# Patient Record
Sex: FEMALE | Race: WHITE | Employment: OTHER | URBAN - METROPOLITAN AREA
[De-identification: names, ages, dates, MRNs, and addresses within clinical notes are randomized per-mention and may not be internally consistent; named-entity substitution may affect disease eponyms.]

---

## 2020-01-06 ENCOUNTER — HOSPITAL ENCOUNTER (OUTPATIENT)
Dept: PREADMISSION TESTING | Age: 65
Discharge: HOME | End: 2020-01-06
Payer: MEDICARE

## 2020-01-06 VITALS
WEIGHT: 237 LBS | TEMPERATURE: 97.9 F | BODY MASS INDEX: 39.49 KG/M2 | SYSTOLIC BLOOD PRESSURE: 135 MMHG | HEART RATE: 84 BPM | DIASTOLIC BLOOD PRESSURE: 80 MMHG | HEIGHT: 65 IN

## 2020-01-06 LAB
ABO + RH BLD: NORMAL
ANION GAP SERPL CALC-SCNC: 6 MMOL/L (ref 5–15)
APPEARANCE UR: CLEAR
ATRIAL RATE: 74 BPM
BACTERIA URNS QL MICRO: NEGATIVE /HPF
BILIRUB UR QL: NEGATIVE
BLOOD GROUP ANTIBODIES SERPL: NORMAL
BUN SERPL-MCNC: 18 MG/DL (ref 6–20)
BUN/CREAT SERPL: 25 (ref 12–20)
CALCIUM SERPL-MCNC: 9.2 MG/DL (ref 8.5–10.1)
CALCULATED P AXIS, ECG09: 51 DEGREES
CALCULATED R AXIS, ECG10: -50 DEGREES
CALCULATED T AXIS, ECG11: 64 DEGREES
CHLORIDE SERPL-SCNC: 101 MMOL/L (ref 97–108)
CO2 SERPL-SCNC: 32 MMOL/L (ref 21–32)
COLOR UR: ABNORMAL
CREAT SERPL-MCNC: 0.73 MG/DL (ref 0.55–1.02)
DIAGNOSIS, 93000: NORMAL
EPITH CASTS URNS QL MICRO: ABNORMAL /LPF
ERYTHROCYTE [DISTWIDTH] IN BLOOD BY AUTOMATED COUNT: 13.6 % (ref 11.5–14.5)
EST. AVERAGE GLUCOSE BLD GHB EST-MCNC: 111 MG/DL
GLUCOSE SERPL-MCNC: 94 MG/DL (ref 65–100)
GLUCOSE UR STRIP.AUTO-MCNC: NEGATIVE MG/DL
HBA1C MFR BLD: 5.5 % (ref 4–5.6)
HCT VFR BLD AUTO: 41.3 % (ref 35–47)
HGB BLD-MCNC: 14 G/DL (ref 11.5–16)
HGB UR QL STRIP: NEGATIVE
HYALINE CASTS URNS QL MICRO: ABNORMAL /LPF (ref 0–5)
INR PPP: 1 (ref 0.9–1.1)
KETONES UR QL STRIP.AUTO: NEGATIVE MG/DL
LEUKOCYTE ESTERASE UR QL STRIP.AUTO: ABNORMAL
MCH RBC QN AUTO: 34.7 PG (ref 26–34)
MCHC RBC AUTO-ENTMCNC: 33.9 G/DL (ref 30–36.5)
MCV RBC AUTO: 102.2 FL (ref 80–99)
NITRITE UR QL STRIP.AUTO: NEGATIVE
NRBC # BLD: 0 K/UL (ref 0–0.01)
NRBC BLD-RTO: 0 PER 100 WBC
P-R INTERVAL, ECG05: 194 MS
PH UR STRIP: 6.5 [PH] (ref 5–8)
PLATELET # BLD AUTO: 159 K/UL (ref 150–400)
PMV BLD AUTO: 11 FL (ref 8.9–12.9)
POTASSIUM SERPL-SCNC: 3.8 MMOL/L (ref 3.5–5.1)
PROT UR STRIP-MCNC: NEGATIVE MG/DL
PROTHROMBIN TIME: 10.1 SEC (ref 9–11.1)
Q-T INTERVAL, ECG07: 456 MS
QRS DURATION, ECG06: 108 MS
QTC CALCULATION (BEZET), ECG08: 506 MS
RBC # BLD AUTO: 4.04 M/UL (ref 3.8–5.2)
RBC #/AREA URNS HPF: ABNORMAL /HPF (ref 0–5)
SODIUM SERPL-SCNC: 139 MMOL/L (ref 136–145)
SP GR UR REFRACTOMETRY: 1.01 (ref 1–1.03)
SPECIMEN EXP DATE BLD: NORMAL
UA: UC IF INDICATED,UAUC: ABNORMAL
UR CULT HOLD, URHOLD: NORMAL
UROBILINOGEN UR QL STRIP.AUTO: 0.2 EU/DL (ref 0.2–1)
VENTRICULAR RATE, ECG03: 74 BPM
WBC # BLD AUTO: 6.3 K/UL (ref 3.6–11)
WBC URNS QL MICRO: ABNORMAL /HPF (ref 0–4)

## 2020-01-06 PROCEDURE — 81001 URINALYSIS AUTO W/SCOPE: CPT

## 2020-01-06 PROCEDURE — 85610 PROTHROMBIN TIME: CPT

## 2020-01-06 PROCEDURE — 80048 BASIC METABOLIC PNL TOTAL CA: CPT

## 2020-01-06 PROCEDURE — 93005 ELECTROCARDIOGRAM TRACING: CPT

## 2020-01-06 PROCEDURE — 85027 COMPLETE CBC AUTOMATED: CPT

## 2020-01-06 PROCEDURE — 36415 COLL VENOUS BLD VENIPUNCTURE: CPT

## 2020-01-06 PROCEDURE — 83036 HEMOGLOBIN GLYCOSYLATED A1C: CPT

## 2020-01-06 PROCEDURE — 86900 BLOOD TYPING SEROLOGIC ABO: CPT

## 2020-01-06 RX ORDER — CHOLECALCIFEROL TAB 125 MCG (5000 UNIT) 125 MCG
1000 TAB ORAL DAILY
COMMUNITY

## 2020-01-06 RX ORDER — LANOLIN ALCOHOL/MO/W.PET/CERES
1000 CREAM (GRAM) TOPICAL DAILY
COMMUNITY

## 2020-01-06 RX ORDER — NABUMETONE 500 MG/1
500 TABLET, FILM COATED ORAL 2 TIMES DAILY
COMMUNITY

## 2020-01-06 RX ORDER — ZINC GLUCONATE 10 MG
250 LOZENGE ORAL DAILY
COMMUNITY

## 2020-01-06 RX ORDER — UREA 10 %
800 LOTION (ML) TOPICAL DAILY
COMMUNITY

## 2020-01-06 RX ORDER — INDAPAMIDE 2.5 MG/1
2.5 TABLET, FILM COATED ORAL DAILY
COMMUNITY

## 2020-01-06 RX ORDER — IBUPROFEN 200 MG
250 CAPSULE ORAL DAILY
COMMUNITY

## 2020-01-06 RX ORDER — METHOTREXATE 2.5 MG/1
2.5 TABLET ORAL
COMMUNITY

## 2020-01-06 RX ORDER — BISMUTH SUBSALICYLATE 262 MG
1 TABLET,CHEWABLE ORAL DAILY
COMMUNITY

## 2020-01-06 NOTE — PERIOP NOTES
PATIENT GIVEN SURGICAL SITE INFECTION FAQ HANDOUT AND HAND WASHING TIP SHEET. PREOP INSTRUCTIONS REVIEWED AND PATIENT VERBALIZES UNDERSTANDING OF INSTRUCTIONS. PATIENT HAS BEEN GIVEN THE OPPORTUNITY TO ASK ADDITIONAL QUESTIONS. PREOPERATIVE INSTRUCTIONS REVIEWED WITH PATIENT. PATIENT GIVEN SIX PACK OF CHG CLEANSER  INSTRUCTIONS (REVIEWED IN CLASS) ON USE OF CHG CLEANSER . PATIENT GIVEN SSI INFECTION FAQ SHEET, AS WELL AS HAND WASHING TIPS SHEETS. MRSA/MSSA TREATMENT INSTRUCTION SHEET GIVEN WITH EXPLANATION TO PATIENT THAT THEY WILL BE NOTIFIED IF TREATMENT INSTRUCTIONS NEED TO BE INITIATED. PATIENT WAS GIVEN THE OPPORTUNITY TO ASK QUESTIONS ON THE INFORMATION PROVIDED. PATIENT ATTENDED PREOP JOINT CLASS TODAY WITH HER DAUGHTER.

## 2020-01-07 LAB
BACTERIA SPEC CULT: NORMAL
BACTERIA SPEC CULT: NORMAL
SERVICE CMNT-IMP: NORMAL

## 2020-01-13 RX ORDER — CEFAZOLIN SODIUM/WATER 2 G/20 ML
2 SYRINGE (ML) INTRAVENOUS ONCE
Status: CANCELLED | OUTPATIENT
Start: 2020-01-15 | End: 2020-01-15

## 2020-01-13 RX ORDER — ACETAMINOPHEN 500 MG
1000 TABLET ORAL ONCE
Status: CANCELLED | OUTPATIENT
Start: 2020-01-15 | End: 2020-01-15

## 2020-01-13 RX ORDER — DEXAMETHASONE SODIUM PHOSPHATE 10 MG/ML
4 INJECTION INTRAMUSCULAR; INTRAVENOUS ONCE
Status: CANCELLED | OUTPATIENT
Start: 2020-01-15 | End: 2020-01-15

## 2020-01-13 RX ORDER — PREGABALIN 75 MG/1
75 CAPSULE ORAL ONCE
Status: CANCELLED | OUTPATIENT
Start: 2020-01-15 | End: 2020-01-15

## 2020-01-13 RX ORDER — CELECOXIB 200 MG/1
200 CAPSULE ORAL ONCE
Status: CANCELLED | OUTPATIENT
Start: 2020-01-15 | End: 2020-01-15

## 2020-01-15 ENCOUNTER — APPOINTMENT (OUTPATIENT)
Dept: GENERAL RADIOLOGY | Age: 65
End: 2020-01-15
Attending: ORTHOPAEDIC SURGERY
Payer: MEDICARE

## 2020-01-15 ENCOUNTER — ANESTHESIA EVENT (OUTPATIENT)
Dept: SURGERY | Age: 65
End: 2020-01-15
Payer: MEDICARE

## 2020-01-15 ENCOUNTER — HOSPITAL ENCOUNTER (OUTPATIENT)
Age: 65
Discharge: HOME | End: 2020-01-16
Attending: ORTHOPAEDIC SURGERY | Admitting: ORTHOPAEDIC SURGERY
Payer: MEDICARE

## 2020-01-15 ENCOUNTER — ANESTHESIA (OUTPATIENT)
Dept: SURGERY | Age: 65
End: 2020-01-15
Payer: MEDICARE

## 2020-01-15 DIAGNOSIS — M17.12 PRIMARY OSTEOARTHRITIS OF LEFT KNEE: Primary | ICD-10-CM

## 2020-01-15 LAB
GLUCOSE BLD STRIP.AUTO-MCNC: 114 MG/DL (ref 65–100)
SERVICE CMNT-IMP: ABNORMAL

## 2020-01-15 PROCEDURE — 77030040922 HC BLNKT HYPOTHRM STRY -A

## 2020-01-15 PROCEDURE — 74011000250 HC RX REV CODE- 250: Performed by: ORTHOPAEDIC SURGERY

## 2020-01-15 PROCEDURE — C1776 JOINT DEVICE (IMPLANTABLE): HCPCS | Performed by: ORTHOPAEDIC SURGERY

## 2020-01-15 PROCEDURE — 77030018846 HC SOL IRR STRL H20 ICUM -A: Performed by: ORTHOPAEDIC SURGERY

## 2020-01-15 PROCEDURE — 77030003601 HC NDL NRV BLK BBMI -A

## 2020-01-15 PROCEDURE — 76210000006 HC OR PH I REC 0.5 TO 1 HR: Performed by: ORTHOPAEDIC SURGERY

## 2020-01-15 PROCEDURE — 74011250636 HC RX REV CODE- 250/636: Performed by: NURSE ANESTHETIST, CERTIFIED REGISTERED

## 2020-01-15 PROCEDURE — C1713 ANCHOR/SCREW BN/BN,TIS/BN: HCPCS | Performed by: ORTHOPAEDIC SURGERY

## 2020-01-15 PROCEDURE — 74011250636 HC RX REV CODE- 250/636: Performed by: ORTHOPAEDIC SURGERY

## 2020-01-15 PROCEDURE — 77030020268 HC MISC GENERAL SUPPLY: Performed by: ORTHOPAEDIC SURGERY

## 2020-01-15 PROCEDURE — 87205 SMEAR GRAM STAIN: CPT

## 2020-01-15 PROCEDURE — 74011250636 HC RX REV CODE- 250/636: Performed by: ANESTHESIOLOGY

## 2020-01-15 PROCEDURE — 77030010783 HC BOWL MX BN CEM J&J -B: Performed by: ORTHOPAEDIC SURGERY

## 2020-01-15 PROCEDURE — 77030012935 HC DRSG AQUACEL BMS -B: Performed by: ORTHOPAEDIC SURGERY

## 2020-01-15 PROCEDURE — 76010000172 HC OR TIME 2.5 TO 3 HR INTENSV-TIER 1: Performed by: ORTHOPAEDIC SURGERY

## 2020-01-15 PROCEDURE — 87075 CULTR BACTERIA EXCEPT BLOOD: CPT

## 2020-01-15 PROCEDURE — 74011000250 HC RX REV CODE- 250: Performed by: ANESTHESIOLOGY

## 2020-01-15 PROCEDURE — 74011000258 HC RX REV CODE- 258: Performed by: ORTHOPAEDIC SURGERY

## 2020-01-15 PROCEDURE — 82962 GLUCOSE BLOOD TEST: CPT

## 2020-01-15 PROCEDURE — 77030006822 HC BLD SAW SAG BRSM -B: Performed by: ORTHOPAEDIC SURGERY

## 2020-01-15 PROCEDURE — 77030018842 HC SOL IRR SOD CL 9% BAXT -A: Performed by: ORTHOPAEDIC SURGERY

## 2020-01-15 PROCEDURE — 77030018673: Performed by: ORTHOPAEDIC SURGERY

## 2020-01-15 PROCEDURE — C9290 INJ, BUPIVACAINE LIPOSOME: HCPCS | Performed by: ORTHOPAEDIC SURGERY

## 2020-01-15 PROCEDURE — 97116 GAIT TRAINING THERAPY: CPT

## 2020-01-15 PROCEDURE — 97161 PT EVAL LOW COMPLEX 20 MIN: CPT

## 2020-01-15 PROCEDURE — 77030018836 HC SOL IRR NACL ICUM -A: Performed by: ORTHOPAEDIC SURGERY

## 2020-01-15 PROCEDURE — 77030031139 HC SUT VCRL2 J&J -A: Performed by: ORTHOPAEDIC SURGERY

## 2020-01-15 PROCEDURE — 76060000036 HC ANESTHESIA 2.5 TO 3 HR: Performed by: ORTHOPAEDIC SURGERY

## 2020-01-15 PROCEDURE — 77030000032 HC CUF TRNQT ZIMM -B: Performed by: ORTHOPAEDIC SURGERY

## 2020-01-15 PROCEDURE — 77030007866 HC KT SPN ANES BBMI -B: Performed by: NURSE ANESTHETIST, CERTIFIED REGISTERED

## 2020-01-15 PROCEDURE — 77030011640 HC PAD GRND REM COVD -A: Performed by: ORTHOPAEDIC SURGERY

## 2020-01-15 PROCEDURE — 74011000250 HC RX REV CODE- 250: Performed by: NURSE ANESTHETIST, CERTIFIED REGISTERED

## 2020-01-15 PROCEDURE — 74011250637 HC RX REV CODE- 250/637: Performed by: ORTHOPAEDIC SURGERY

## 2020-01-15 PROCEDURE — 77030035236 HC SUT PDS STRATFX BARB J&J -B: Performed by: ORTHOPAEDIC SURGERY

## 2020-01-15 PROCEDURE — 73560 X-RAY EXAM OF KNEE 1 OR 2: CPT

## 2020-01-15 DEVICE — ATTUNE PATELLA MEDIALIZED DOME 35MM CEMENTED AOX
Type: IMPLANTABLE DEVICE | Site: KNEE | Status: FUNCTIONAL
Brand: ATTUNE

## 2020-01-15 DEVICE — ATTUNE KNEE SYSTEM FEMORAL CRUCIATE RETAINING SIZE 7 LEFT CEMENTED
Type: IMPLANTABLE DEVICE | Site: KNEE | Status: FUNCTIONAL
Brand: ATTUNE

## 2020-01-15 DEVICE — ATTUNE KNEE SYSTEM REVISION CEMENTED STEM CEMENTED 14X50MM
Type: IMPLANTABLE DEVICE | Site: KNEE | Status: FUNCTIONAL
Brand: ATTUNE

## 2020-01-15 DEVICE — ATTUNE KNEE SYSTEM TIBIAL INSERT FIXED BEARING CRUCIATE RETAINING 7 6MM AOX
Type: IMPLANTABLE DEVICE | Site: KNEE | Status: FUNCTIONAL
Brand: ATTUNE

## 2020-01-15 DEVICE — SMARTSET GHV GENTAMICIN HIGH VISCOSITY BONE CEMENT 40G
Type: IMPLANTABLE DEVICE | Site: KNEE | Status: FUNCTIONAL
Brand: SMARTSET

## 2020-01-15 DEVICE — INSERT TIB RP FEM KNEE CEM: Type: IMPLANTABLE DEVICE | Status: FUNCTIONAL

## 2020-01-15 DEVICE — ATTUNE KNEE SYSTEM REVISION FIXED BEARING TIBIAL BASE CEMENTED SIZE 7
Type: IMPLANTABLE DEVICE | Site: KNEE | Status: FUNCTIONAL
Brand: ATTUNE

## 2020-01-15 RX ORDER — ACETAMINOPHEN 325 MG/1
650 TABLET ORAL EVERY 6 HOURS
Status: DISCONTINUED | OUTPATIENT
Start: 2020-01-15 | End: 2020-01-16 | Stop reason: HOSPADM

## 2020-01-15 RX ORDER — SODIUM CHLORIDE 0.9 % (FLUSH) 0.9 %
5-40 SYRINGE (ML) INJECTION AS NEEDED
Status: DISCONTINUED | OUTPATIENT
Start: 2020-01-15 | End: 2020-01-15 | Stop reason: HOSPADM

## 2020-01-15 RX ORDER — CEFAZOLIN SODIUM/WATER 2 G/20 ML
2 SYRINGE (ML) INTRAVENOUS EVERY 8 HOURS
Status: COMPLETED | OUTPATIENT
Start: 2020-01-15 | End: 2020-01-16

## 2020-01-15 RX ORDER — MIDAZOLAM HYDROCHLORIDE 1 MG/ML
1 INJECTION, SOLUTION INTRAMUSCULAR; INTRAVENOUS AS NEEDED
Status: DISCONTINUED | OUTPATIENT
Start: 2020-01-15 | End: 2020-01-15 | Stop reason: HOSPADM

## 2020-01-15 RX ORDER — NALOXONE HYDROCHLORIDE 0.4 MG/ML
0.4 INJECTION, SOLUTION INTRAMUSCULAR; INTRAVENOUS; SUBCUTANEOUS AS NEEDED
Status: DISCONTINUED | OUTPATIENT
Start: 2020-01-15 | End: 2020-01-16 | Stop reason: HOSPADM

## 2020-01-15 RX ORDER — DEXMEDETOMIDINE HYDROCHLORIDE 100 UG/ML
INJECTION, SOLUTION INTRAVENOUS AS NEEDED
Status: DISCONTINUED | OUTPATIENT
Start: 2020-01-15 | End: 2020-01-15 | Stop reason: HOSPADM

## 2020-01-15 RX ORDER — FACIAL-BODY WIPES
10 EACH TOPICAL DAILY PRN
Status: DISCONTINUED | OUTPATIENT
Start: 2020-01-16 | End: 2020-01-16 | Stop reason: HOSPADM

## 2020-01-15 RX ORDER — LANOLIN ALCOHOL/MO/W.PET/CERES
800 CREAM (GRAM) TOPICAL DAILY
Status: DISCONTINUED | OUTPATIENT
Start: 2020-01-16 | End: 2020-01-16 | Stop reason: HOSPADM

## 2020-01-15 RX ORDER — SODIUM CHLORIDE 0.9 % (FLUSH) 0.9 %
5-40 SYRINGE (ML) INJECTION EVERY 8 HOURS
Status: DISCONTINUED | OUTPATIENT
Start: 2020-01-15 | End: 2020-01-16 | Stop reason: HOSPADM

## 2020-01-15 RX ORDER — DEXAMETHASONE SODIUM PHOSPHATE 10 MG/ML
4 INJECTION INTRAMUSCULAR; INTRAVENOUS ONCE
Status: DISCONTINUED | OUTPATIENT
Start: 2020-01-15 | End: 2020-01-15 | Stop reason: HOSPADM

## 2020-01-15 RX ORDER — HYDROXYZINE HYDROCHLORIDE 10 MG/1
10 TABLET, FILM COATED ORAL
Status: DISCONTINUED | OUTPATIENT
Start: 2020-01-15 | End: 2020-01-16 | Stop reason: HOSPADM

## 2020-01-15 RX ORDER — ONDANSETRON 2 MG/ML
4 INJECTION INTRAMUSCULAR; INTRAVENOUS
Status: DISCONTINUED | OUTPATIENT
Start: 2020-01-15 | End: 2020-01-16 | Stop reason: HOSPADM

## 2020-01-15 RX ORDER — SODIUM CHLORIDE 9 MG/ML
125 INJECTION, SOLUTION INTRAVENOUS CONTINUOUS
Status: DISPENSED | OUTPATIENT
Start: 2020-01-15 | End: 2020-01-16

## 2020-01-15 RX ORDER — PROPOFOL 10 MG/ML
INJECTION, EMULSION INTRAVENOUS AS NEEDED
Status: DISCONTINUED | OUTPATIENT
Start: 2020-01-15 | End: 2020-01-15 | Stop reason: HOSPADM

## 2020-01-15 RX ORDER — FENTANYL CITRATE 50 UG/ML
50 INJECTION, SOLUTION INTRAMUSCULAR; INTRAVENOUS AS NEEDED
Status: DISCONTINUED | OUTPATIENT
Start: 2020-01-15 | End: 2020-01-15 | Stop reason: HOSPADM

## 2020-01-15 RX ORDER — SODIUM CHLORIDE 0.9 % (FLUSH) 0.9 %
5-40 SYRINGE (ML) INJECTION AS NEEDED
Status: DISCONTINUED | OUTPATIENT
Start: 2020-01-15 | End: 2020-01-16 | Stop reason: HOSPADM

## 2020-01-15 RX ORDER — POLYETHYLENE GLYCOL 3350 17 G/17G
17 POWDER, FOR SOLUTION ORAL DAILY
Status: DISCONTINUED | OUTPATIENT
Start: 2020-01-16 | End: 2020-01-16 | Stop reason: HOSPADM

## 2020-01-15 RX ORDER — HYDROMORPHONE HYDROCHLORIDE 1 MG/ML
0.2 INJECTION, SOLUTION INTRAMUSCULAR; INTRAVENOUS; SUBCUTANEOUS
Status: DISCONTINUED | OUTPATIENT
Start: 2020-01-15 | End: 2020-01-15 | Stop reason: HOSPADM

## 2020-01-15 RX ORDER — KETOROLAC TROMETHAMINE 30 MG/ML
15 INJECTION, SOLUTION INTRAMUSCULAR; INTRAVENOUS EVERY 6 HOURS
Status: COMPLETED | OUTPATIENT
Start: 2020-01-15 | End: 2020-01-16

## 2020-01-15 RX ORDER — CEFAZOLIN SODIUM/WATER 2 G/20 ML
2 SYRINGE (ML) INTRAVENOUS ONCE
Status: COMPLETED | OUTPATIENT
Start: 2020-01-15 | End: 2020-01-15

## 2020-01-15 RX ORDER — ONDANSETRON 2 MG/ML
4 INJECTION INTRAMUSCULAR; INTRAVENOUS AS NEEDED
Status: DISCONTINUED | OUTPATIENT
Start: 2020-01-15 | End: 2020-01-15 | Stop reason: HOSPADM

## 2020-01-15 RX ORDER — FENTANYL CITRATE 50 UG/ML
25 INJECTION, SOLUTION INTRAMUSCULAR; INTRAVENOUS
Status: DISCONTINUED | OUTPATIENT
Start: 2020-01-15 | End: 2020-01-15 | Stop reason: HOSPADM

## 2020-01-15 RX ORDER — PHENYLEPHRINE HCL IN 0.9% NACL 0.4MG/10ML
SYRINGE (ML) INTRAVENOUS AS NEEDED
Status: DISCONTINUED | OUTPATIENT
Start: 2020-01-15 | End: 2020-01-15 | Stop reason: HOSPADM

## 2020-01-15 RX ORDER — LIDOCAINE HYDROCHLORIDE 10 MG/ML
0.1 INJECTION, SOLUTION EPIDURAL; INFILTRATION; INTRACAUDAL; PERINEURAL AS NEEDED
Status: DISCONTINUED | OUTPATIENT
Start: 2020-01-15 | End: 2020-01-15 | Stop reason: HOSPADM

## 2020-01-15 RX ORDER — FAMOTIDINE 20 MG/1
20 TABLET, FILM COATED ORAL 2 TIMES DAILY
Status: DISCONTINUED | OUTPATIENT
Start: 2020-01-15 | End: 2020-01-16 | Stop reason: HOSPADM

## 2020-01-15 RX ORDER — ACETAMINOPHEN 500 MG
1000 TABLET ORAL ONCE
Status: COMPLETED | OUTPATIENT
Start: 2020-01-15 | End: 2020-01-15

## 2020-01-15 RX ORDER — SODIUM CHLORIDE, SODIUM LACTATE, POTASSIUM CHLORIDE, CALCIUM CHLORIDE 600; 310; 30; 20 MG/100ML; MG/100ML; MG/100ML; MG/100ML
125 INJECTION, SOLUTION INTRAVENOUS CONTINUOUS
Status: DISCONTINUED | OUTPATIENT
Start: 2020-01-15 | End: 2020-01-15 | Stop reason: HOSPADM

## 2020-01-15 RX ORDER — ONDANSETRON 2 MG/ML
INJECTION INTRAMUSCULAR; INTRAVENOUS AS NEEDED
Status: DISCONTINUED | OUTPATIENT
Start: 2020-01-15 | End: 2020-01-15 | Stop reason: HOSPADM

## 2020-01-15 RX ORDER — SODIUM CHLORIDE 9 MG/ML
50 INJECTION, SOLUTION INTRAVENOUS CONTINUOUS
Status: DISCONTINUED | OUTPATIENT
Start: 2020-01-15 | End: 2020-01-15 | Stop reason: HOSPADM

## 2020-01-15 RX ORDER — INDAPAMIDE 2.5 MG/1
2.5 TABLET, FILM COATED ORAL DAILY
Status: DISCONTINUED | OUTPATIENT
Start: 2020-01-16 | End: 2020-01-16 | Stop reason: HOSPADM

## 2020-01-15 RX ORDER — DIPHENHYDRAMINE HYDROCHLORIDE 50 MG/ML
12.5 INJECTION, SOLUTION INTRAMUSCULAR; INTRAVENOUS AS NEEDED
Status: DISCONTINUED | OUTPATIENT
Start: 2020-01-15 | End: 2020-01-15 | Stop reason: HOSPADM

## 2020-01-15 RX ORDER — ASPIRIN 81 MG/1
81 TABLET ORAL 2 TIMES DAILY
Status: DISCONTINUED | OUTPATIENT
Start: 2020-01-15 | End: 2020-01-16 | Stop reason: HOSPADM

## 2020-01-15 RX ORDER — MELATONIN
1000 DAILY
Status: DISCONTINUED | OUTPATIENT
Start: 2020-01-16 | End: 2020-01-16 | Stop reason: HOSPADM

## 2020-01-15 RX ORDER — CELECOXIB 200 MG/1
200 CAPSULE ORAL ONCE
Status: COMPLETED | OUTPATIENT
Start: 2020-01-15 | End: 2020-01-15

## 2020-01-15 RX ORDER — LIDOCAINE HYDROCHLORIDE 20 MG/ML
INJECTION, SOLUTION EPIDURAL; INFILTRATION; INTRACAUDAL; PERINEURAL AS NEEDED
Status: DISCONTINUED | OUTPATIENT
Start: 2020-01-15 | End: 2020-01-15 | Stop reason: HOSPADM

## 2020-01-15 RX ORDER — SODIUM CHLORIDE 0.9 % (FLUSH) 0.9 %
5-40 SYRINGE (ML) INJECTION EVERY 8 HOURS
Status: DISCONTINUED | OUTPATIENT
Start: 2020-01-15 | End: 2020-01-15 | Stop reason: HOSPADM

## 2020-01-15 RX ORDER — HYDROMORPHONE HYDROCHLORIDE 1 MG/ML
0.5 INJECTION, SOLUTION INTRAMUSCULAR; INTRAVENOUS; SUBCUTANEOUS
Status: DISCONTINUED | OUTPATIENT
Start: 2020-01-15 | End: 2020-01-16 | Stop reason: HOSPADM

## 2020-01-15 RX ORDER — SODIUM CHLORIDE 9 MG/ML
25 INJECTION, SOLUTION INTRAVENOUS CONTINUOUS
Status: DISCONTINUED | OUTPATIENT
Start: 2020-01-15 | End: 2020-01-15 | Stop reason: HOSPADM

## 2020-01-15 RX ORDER — MORPHINE SULFATE 10 MG/ML
2 INJECTION, SOLUTION INTRAMUSCULAR; INTRAVENOUS
Status: DISCONTINUED | OUTPATIENT
Start: 2020-01-15 | End: 2020-01-15 | Stop reason: HOSPADM

## 2020-01-15 RX ORDER — LANOLIN ALCOHOL/MO/W.PET/CERES
400 CREAM (GRAM) TOPICAL DAILY
Status: DISCONTINUED | OUTPATIENT
Start: 2020-01-16 | End: 2020-01-16 | Stop reason: HOSPADM

## 2020-01-15 RX ORDER — MIDAZOLAM HYDROCHLORIDE 1 MG/ML
0.5 INJECTION, SOLUTION INTRAMUSCULAR; INTRAVENOUS
Status: DISCONTINUED | OUTPATIENT
Start: 2020-01-15 | End: 2020-01-15 | Stop reason: HOSPADM

## 2020-01-15 RX ORDER — OXYCODONE HYDROCHLORIDE 5 MG/1
5 TABLET ORAL
Status: DISCONTINUED | OUTPATIENT
Start: 2020-01-15 | End: 2020-01-16 | Stop reason: HOSPADM

## 2020-01-15 RX ORDER — PREGABALIN 75 MG/1
75 CAPSULE ORAL ONCE
Status: COMPLETED | OUTPATIENT
Start: 2020-01-15 | End: 2020-01-15

## 2020-01-15 RX ORDER — HYDROCODONE BITARTRATE AND ACETAMINOPHEN 5; 325 MG/1; MG/1
1 TABLET ORAL AS NEEDED
Status: DISCONTINUED | OUTPATIENT
Start: 2020-01-15 | End: 2020-01-15 | Stop reason: HOSPADM

## 2020-01-15 RX ORDER — GLYCOPYRROLATE 0.2 MG/ML
INJECTION INTRAMUSCULAR; INTRAVENOUS AS NEEDED
Status: DISCONTINUED | OUTPATIENT
Start: 2020-01-15 | End: 2020-01-15 | Stop reason: HOSPADM

## 2020-01-15 RX ORDER — THERA TABS 400 MCG
1 TAB ORAL DAILY
Status: DISCONTINUED | OUTPATIENT
Start: 2020-01-16 | End: 2020-01-16 | Stop reason: HOSPADM

## 2020-01-15 RX ORDER — MIDAZOLAM HYDROCHLORIDE 1 MG/ML
INJECTION, SOLUTION INTRAMUSCULAR; INTRAVENOUS AS NEEDED
Status: DISCONTINUED | OUTPATIENT
Start: 2020-01-15 | End: 2020-01-15 | Stop reason: HOSPADM

## 2020-01-15 RX ORDER — PROPOFOL 10 MG/ML
INJECTION, EMULSION INTRAVENOUS
Status: DISCONTINUED | OUTPATIENT
Start: 2020-01-15 | End: 2020-01-15 | Stop reason: HOSPADM

## 2020-01-15 RX ORDER — AMOXICILLIN 250 MG
1 CAPSULE ORAL 2 TIMES DAILY
Status: DISCONTINUED | OUTPATIENT
Start: 2020-01-15 | End: 2020-01-16 | Stop reason: HOSPADM

## 2020-01-15 RX ORDER — SODIUM CHLORIDE 9 MG/ML
INJECTION, SOLUTION INTRAVENOUS
Status: DISCONTINUED | OUTPATIENT
Start: 2020-01-15 | End: 2020-01-15 | Stop reason: HOSPADM

## 2020-01-15 RX ORDER — DEXAMETHASONE SODIUM PHOSPHATE 4 MG/ML
INJECTION, SOLUTION INTRA-ARTICULAR; INTRALESIONAL; INTRAMUSCULAR; INTRAVENOUS; SOFT TISSUE AS NEEDED
Status: DISCONTINUED | OUTPATIENT
Start: 2020-01-15 | End: 2020-01-15 | Stop reason: HOSPADM

## 2020-01-15 RX ORDER — BUPIVACAINE HYDROCHLORIDE 5 MG/ML
INJECTION, SOLUTION EPIDURAL; INTRACAUDAL
Status: COMPLETED | OUTPATIENT
Start: 2020-01-15 | End: 2020-01-15

## 2020-01-15 RX ORDER — TRAMADOL HYDROCHLORIDE 50 MG/1
50 TABLET ORAL
Status: DISCONTINUED | OUTPATIENT
Start: 2020-01-15 | End: 2020-01-15

## 2020-01-15 RX ORDER — SODIUM CHLORIDE, SODIUM LACTATE, POTASSIUM CHLORIDE, CALCIUM CHLORIDE 600; 310; 30; 20 MG/100ML; MG/100ML; MG/100ML; MG/100ML
75 INJECTION, SOLUTION INTRAVENOUS CONTINUOUS
Status: DISCONTINUED | OUTPATIENT
Start: 2020-01-15 | End: 2020-01-15 | Stop reason: HOSPADM

## 2020-01-15 RX ORDER — CALCIUM CARBONATE 500(1250)
500 TABLET ORAL DAILY
Status: DISCONTINUED | OUTPATIENT
Start: 2020-01-16 | End: 2020-01-16 | Stop reason: HOSPADM

## 2020-01-15 RX ORDER — TRAMADOL HYDROCHLORIDE 50 MG/1
50 TABLET ORAL
Status: DISCONTINUED | OUTPATIENT
Start: 2020-01-15 | End: 2020-01-16 | Stop reason: HOSPADM

## 2020-01-15 RX ADMIN — PREGABALIN 75 MG: 75 CAPSULE ORAL at 08:38

## 2020-01-15 RX ADMIN — ACETAMINOPHEN 1000 MG: 500 TABLET ORAL at 08:38

## 2020-01-15 RX ADMIN — Medication 40 MCG: at 11:02

## 2020-01-15 RX ADMIN — DEXMEDETOMIDINE HYDROCHLORIDE 3 MCG: 100 INJECTION, SOLUTION, CONCENTRATE INTRAVENOUS at 09:48

## 2020-01-15 RX ADMIN — ACETAMINOPHEN 650 MG: 325 TABLET ORAL at 17:14

## 2020-01-15 RX ADMIN — SODIUM CHLORIDE 125 ML/HR: 900 INJECTION, SOLUTION INTRAVENOUS at 23:45

## 2020-01-15 RX ADMIN — SENNOSIDES AND DOCUSATE SODIUM 1 TABLET: 8.6; 5 TABLET ORAL at 17:14

## 2020-01-15 RX ADMIN — DEXMEDETOMIDINE HYDROCHLORIDE 3 MCG: 100 INJECTION, SOLUTION, CONCENTRATE INTRAVENOUS at 09:33

## 2020-01-15 RX ADMIN — Medication 80 MCG: at 11:10

## 2020-01-15 RX ADMIN — SODIUM CHLORIDE: 900 INJECTION, SOLUTION INTRAVENOUS at 11:05

## 2020-01-15 RX ADMIN — TRAMADOL HYDROCHLORIDE 50 MG: 50 TABLET, FILM COATED ORAL at 23:35

## 2020-01-15 RX ADMIN — DEXMEDETOMIDINE HYDROCHLORIDE 3 MCG: 100 INJECTION, SOLUTION, CONCENTRATE INTRAVENOUS at 09:57

## 2020-01-15 RX ADMIN — PROPOFOL 25 MCG/KG/MIN: 10 INJECTION, EMULSION INTRAVENOUS at 09:13

## 2020-01-15 RX ADMIN — LIDOCAINE HYDROCHLORIDE 100 MG: 20 INJECTION, SOLUTION EPIDURAL; INFILTRATION; INTRACAUDAL; PERINEURAL at 09:13

## 2020-01-15 RX ADMIN — Medication 40 MCG: at 10:36

## 2020-01-15 RX ADMIN — MIDAZOLAM HYDROCHLORIDE 2 MG: 1 INJECTION, SOLUTION INTRAMUSCULAR; INTRAVENOUS at 09:11

## 2020-01-15 RX ADMIN — Medication 80 MCG: at 11:06

## 2020-01-15 RX ADMIN — DEXMEDETOMIDINE HYDROCHLORIDE 3 MCG: 100 INJECTION, SOLUTION, CONCENTRATE INTRAVENOUS at 09:53

## 2020-01-15 RX ADMIN — BUPIVACAINE HYDROCHLORIDE 10 MG: 5 INJECTION, SOLUTION EPIDURAL; INTRACAUDAL; PERINEURAL at 09:27

## 2020-01-15 RX ADMIN — PHENYLEPHRINE HYDROCHLORIDE 10 MCG/MIN: 10 INJECTION INTRAVENOUS at 09:28

## 2020-01-15 RX ADMIN — Medication 10 ML: at 17:14

## 2020-01-15 RX ADMIN — MIDAZOLAM 2 MG: 1 INJECTION INTRAMUSCULAR; INTRAVENOUS at 08:56

## 2020-01-15 RX ADMIN — PROPOFOL 50 MG: 10 INJECTION, EMULSION INTRAVENOUS at 10:19

## 2020-01-15 RX ADMIN — ONDANSETRON HYDROCHLORIDE 4 MG: 2 INJECTION, SOLUTION INTRAMUSCULAR; INTRAVENOUS at 09:07

## 2020-01-15 RX ADMIN — TRANEXAMIC ACID 1 G: 100 INJECTION, SOLUTION INTRAVENOUS at 09:30

## 2020-01-15 RX ADMIN — LIDOCAINE HYDROCHLORIDE 0.1 ML: 10 INJECTION, SOLUTION EPIDURAL; INFILTRATION; INTRACAUDAL; PERINEURAL at 08:28

## 2020-01-15 RX ADMIN — GLYCOPYRROLATE 0.2 MG: 0.2 INJECTION, SOLUTION INTRAMUSCULAR; INTRAVENOUS at 09:31

## 2020-01-15 RX ADMIN — DEXMEDETOMIDINE HYDROCHLORIDE 3 MCG: 100 INJECTION, SOLUTION, CONCENTRATE INTRAVENOUS at 09:45

## 2020-01-15 RX ADMIN — DEXAMETHASONE SODIUM PHOSPHATE 4 MG: 4 INJECTION, SOLUTION INTRAMUSCULAR; INTRAVENOUS at 09:32

## 2020-01-15 RX ADMIN — SODIUM CHLORIDE, SODIUM LACTATE, POTASSIUM CHLORIDE, AND CALCIUM CHLORIDE 125 ML/HR: 600; 310; 30; 20 INJECTION, SOLUTION INTRAVENOUS at 08:28

## 2020-01-15 RX ADMIN — DEXMEDETOMIDINE HYDROCHLORIDE 3 MCG: 100 INJECTION, SOLUTION, CONCENTRATE INTRAVENOUS at 10:01

## 2020-01-15 RX ADMIN — FENTANYL CITRATE 50 MCG: 50 INJECTION, SOLUTION INTRAMUSCULAR; INTRAVENOUS at 08:56

## 2020-01-15 RX ADMIN — SODIUM CHLORIDE 125 ML/HR: 900 INJECTION, SOLUTION INTRAVENOUS at 17:17

## 2020-01-15 RX ADMIN — DEXMEDETOMIDINE HYDROCHLORIDE 3 MCG: 100 INJECTION, SOLUTION, CONCENTRATE INTRAVENOUS at 09:36

## 2020-01-15 RX ADMIN — ASPIRIN 81 MG: 81 TABLET, COATED ORAL at 17:14

## 2020-01-15 RX ADMIN — FAMOTIDINE 20 MG: 20 TABLET ORAL at 17:13

## 2020-01-15 RX ADMIN — DEXMEDETOMIDINE HYDROCHLORIDE 3 MCG: 100 INJECTION, SOLUTION, CONCENTRATE INTRAVENOUS at 10:12

## 2020-01-15 RX ADMIN — Medication 2 G: at 18:24

## 2020-01-15 RX ADMIN — PROPOFOL 50 MG: 10 INJECTION, EMULSION INTRAVENOUS at 09:30

## 2020-01-15 RX ADMIN — CELECOXIB 200 MG: 200 CAPSULE ORAL at 08:38

## 2020-01-15 RX ADMIN — Medication 2 G: at 09:34

## 2020-01-15 RX ADMIN — DEXMEDETOMIDINE HYDROCHLORIDE 3 MCG: 100 INJECTION, SOLUTION, CONCENTRATE INTRAVENOUS at 09:42

## 2020-01-15 RX ADMIN — KETOROLAC TROMETHAMINE 15 MG: 30 INJECTION, SOLUTION INTRAMUSCULAR at 18:25

## 2020-01-15 RX ADMIN — DEXMEDETOMIDINE HYDROCHLORIDE 3 MCG: 100 INJECTION, SOLUTION, CONCENTRATE INTRAVENOUS at 09:39

## 2020-01-15 RX ADMIN — MIDAZOLAM HYDROCHLORIDE 3 MG: 1 INJECTION, SOLUTION INTRAMUSCULAR; INTRAVENOUS at 09:13

## 2020-01-15 NOTE — ANESTHESIA PREPROCEDURE EVALUATION
Relevant Problems   No relevant active problems       Anesthetic History   No history of anesthetic complications            Review of Systems / Medical History  Patient summary reviewed, nursing notes reviewed and pertinent labs reviewed    Pulmonary  Within defined limits                 Neuro/Psych   Within defined limits           Cardiovascular  Within defined limits  Hypertension                   GI/Hepatic/Renal  Within defined limits              Endo/Other  Within defined limits      Arthritis     Other Findings              Physical Exam    Airway  Mallampati: II  TM Distance: > 6 cm  Neck ROM: normal range of motion   Mouth opening: Normal     Cardiovascular  Regular rate and rhythm,  S1 and S2 normal,  no murmur, click, rub, or gallop             Dental  No notable dental hx       Pulmonary  Breath sounds clear to auscultation               Abdominal  GI exam deferred       Other Findings            Anesthetic Plan    ASA: 2  Anesthesia type: spinal, MAC and general      Post-op pain plan if not by surgeon: peripheral nerve block single    Induction: Intravenous  Anesthetic plan and risks discussed with: Patient

## 2020-01-15 NOTE — PERIOP NOTES
7074: LT leg adductor canal nerve block done by Dr Cristiano Chappell using 20cc of 0.5% ropivicaine with US guidance, pt monitored, O2 @ 2l/m/nc and IV sedation given. Pt tolerated procedure well. VSS post block: 202/27-30-85, SaO2=94% on 2l/m/nc. See anesthesia note.

## 2020-01-15 NOTE — PROGRESS NOTES
Problem: Mobility Impaired (Adult and Pediatric)  Goal: *Acute Goals and Plan of Care (Insert Text)  Description  FUNCTIONAL STATUS PRIOR TO ADMISSION: Patient was independent and active without use of DME.    HOME SUPPORT PRIOR TO ADMISSION: The patient lived alone with no local support. Physical Therapy Goals  Initiated 1/15/2020    1. Patient will move from supine to sit and sit to supine , scoot up and down and roll side to side in bed with modified independence within 4 days. 2. Patient will perform sit to stand with modified independence within 4 days. 3. Patient will ambulate with modified independence for 300 feet with the least restrictive device within 4 days. 4. Patient will ascend/descend 14 stairs with 1 handrail(s) with supervision/set-up within 4 days. 5. Patient will perform home exercise program per protocol with modified independence within 4 days. 6. Patient will demonstrate AROM 0-90 degrees in operative joint within 4 days. Outcome: Progressing Towards Goal    PHYSICAL THERAPY EVALUATION  Patient: Lauren Vasquez (76 y.o. female)  Date: 1/15/2020  Primary Diagnosis: Osteoarthritis of left knee [M17.12]  Procedure(s) (LRB):  LEFT TOTAL KNEE ARTHROPLASTY (Left) Day of Surgery   Precautions: WBAT         ASSESSMENT  Based on the objective data described below, the patient presents with decreased L LE ROM and strength, impaired balance without UE support, and overall decreased independence with mobility following L TKR POD# 0. Prior to admission, patient was independent with mobiltiy. Patient completed LE exercises per protocol (ankle pumps, quad sets, glut sets x 10). Patient overall min A for bed mobility and Johnny-CGA for transfers to/from standing with RW. Ambulated 50 feet with RW and min A, step to gait pattern. Patient returned to bed at end of session.  Recommend HHPT prior to discharge     Current Level of Function Impacting Discharge (mobility/balance): min A with mobility    Functional Outcome Measure: The patient scored Total Score: 6/28 on the Tinetti outcome measure which is indicative of high fall risk. Patient will benefit from skilled therapy intervention to address the above noted impairments. PLAN :  Recommendations and Planned Interventions: bed mobility training, transfer training, gait training, therapeutic exercises, neuromuscular re-education, patient and family training/education, and therapeutic activities      Frequency/Duration: Patient will be followed by physical therapy:  twice daily to address goals. Recommendation for discharge: (in order for the patient to meet his/her long term goals)  Physical therapy at least 2 days/week in the home     This discharge recommendation:  Has been made in collaboration with the attending provider and/or case management    IF patient discharges home will need the following DME: patient owns DME required for discharge         SUBJECTIVE:   Patient stated im surprised at how well im doing.     OBJECTIVE DATA SUMMARY:   HISTORY:    Past Medical History:   Diagnosis Date    Anxiety     Benign heart murmur     Hypertension     Lymphedema     bilateral LE    RA (rheumatoid arthritis) ([facility] [address]      Past Surgical History:   Procedure Laterality Date    HX COLONOSCOPY      HX TONSILLECTOMY         Personal factors and/or comorbidities impacting plan of care:     Home Situation  Home Environment: Private residence  # Steps to Enter: 3  Rails to Enter: No  One/Two Story Residence: Two story  # of Interior Steps: 6  Interior Rails: Right  Living Alone: No  Support Systems: Family member(s)  Current DME Used/Available at Home: Raised toilet seat  Tub or Shower Type: Tub/Shower combination    EXAMINATION/PRESENTATION/DECISION MAKING:   Critical Behavior:  Neurologic State: Drowsy           Hearing:     Skin:  intact  Edema: post op  Range Of Motion:  AROM: Generally decreased, functional           PROM: Generally decreased, functional           Strength:    Strength: Generally decreased, functional                    Tone & Sensation:   Tone: Normal              Sensation: Intact               Coordination:  Coordination: Generally decreased, functional  Vision:      Functional Mobility:  Bed Mobility:  Rolling: Minimum assistance  Supine to Sit: Minimum assistance  Sit to Supine: Minimum assistance  Scooting: Minimum assistance  Transfers:  Sit to Stand: Minimum assistance  Stand to Sit: Minimum assistance                       Balance:   Sitting: Intact; With support  Standing: With support; Impaired  Standing - Static: Fair;Constant support  Standing - Dynamic : Fair;Constant support  Ambulation/Gait Training:  Distance (ft): 50 Feet (ft)  Assistive Device: Walker, rolling;Gait belt  Ambulation - Level of Assistance: Minimal assistance     Gait Description (WDL): Exceptions to WDL  Gait Abnormalities: Antalgic; Shuffling gait     Left Side Weight Bearing: As tolerated  Base of Support: Widened     Speed/Lashanda: Shuffled; Slow  Step Length: Left shortened;Right shortened     Functional Measure:  Tinetti test:    Sitting Balance: 1  Arises: 0  Attempts to Rise: 0  Immediate Standing Balance: 1  Standing Balance: 1  Nudged: 0  Eyes Closed: 0  Turn 360 Degrees - Continuous/Discontinuous: 0  Turn 360 Degrees - Steady/Unsteady: 0  Sitting Down: 0  Balance Score: 3 Balance total score  Indication of Gait: 0  R Step Length/Height: 0  L Step Length/Height: 1  R Foot Clearance: 1  L Foot Clearance: 1  Step Symmetry: 0  Step Continuity: 0  Path: 0  Trunk: 0  Walking Time: 0  Gait Score: 3 Gait total score  Total Score: 6/28 Overall total score         Tinetti Tool Score Risk of Falls  <19 = High Fall Risk  19-24 = Moderate Fall Risk  25-28 = Low Fall Risk  Tinetti ME. Performance-Oriented Assessment of Mobility Problems in Elderly Patients. [address]; [id number].  (Scoring Description: PT Bulletin Feb. 10, 1993)    Older adults: Nito Anderson et al, 2009; n = 1000 [facility] elderly evaluated with ABC, BALBIR, ADL, and IADL)  · Mean BALBIR score for males aged 69-68 years = 26.21(3.40)  · Mean BALBIR score for females age 69-68 years = 25.16(4.30)  · Mean BALBIR score for males over 80 years = 23.29(6.02)  · Mean BALBIR score for females over 80 years = 17.20(8.32)        Physical Therapy Evaluation Charge Determination   History Examination Presentation Decision-Making   HIGH Complexity :3+ comorbidities / personal factors will impact the outcome/ POC  HIGH Complexity : 4+ Standardized tests and measures addressing body structure, function, activity limitation and / or participation in recreation  LOW Complexity : Stable, uncomplicated  Other outcome measures tinetti  HIGH       Based on the above components, the patient evaluation is determined to be of the following complexity level: LOW     Pain Ratin/10    Activity Tolerance:   Good, Fair, SpO2 stable on RA, and requires rest breaks  Please refer to the flowsheet for vital signs taken during this treatment. After treatment patient left in no apparent distress:   Supine in bed, Call bell within reach, and Caregiver / family present    COMMUNICATION/EDUCATION:   The patients plan of care was discussed with: Registered Nurse and . Fall prevention education was provided and the patient/caregiver indicated understanding. and Patient/family have participated as able in goal setting and plan of care.     Thank you for this referral.  Haider Hewitt, PT   Time Calculation: 29 mins

## 2020-01-15 NOTE — ANESTHESIA POSTPROCEDURE EVALUATION
Procedure(s):  LEFT TOTAL KNEE ARTHROPLASTY. spinal, MAC, general    <BSHSIANPOST>    Vitals Value Taken Time   /54 1/15/2020 12:05 PM   Temp 36.4 °C (97.5 °F) 1/15/2020 12:03 PM   Pulse 70 1/15/2020 12:10 PM   Resp 19 1/15/2020 12:10 PM   SpO2 94 % 1/15/2020 12:10 PM   Vitals shown include unvalidated device data.

## 2020-01-15 NOTE — ANESTHESIA PROCEDURE NOTES
Peripheral Block    Performed by: Mayi Skinner MD  Authorized by: Mayi Skinner MD       Block Type:     Assessment:    Injection Assessment:

## 2020-01-15 NOTE — PERIOP NOTES
Patient: Ajit Yates MRN: [id number]  SSN: [id number]   YOB: 1955  Age: 72 y.o. Sex: female     Patient is status post Procedure(s):  LEFT TOTAL KNEE ARTHROPLASTY. Surgeon(s) and Role:     * Liu Busby MD - Primary    Local/Dose/Irrigation:  70ml Exparel/ 0.50% Marcaine Mix                  Peripheral IV 01/15/20 Left Hand (Active)   Site Assessment Clean, dry, & intact 1/15/2020  8:22 AM   Dressing Status Clean, dry, & intact 1/15/2020  8:22 AM   Dressing Type Transparent 1/15/2020  8:22 AM   Hub Color/Line Status Infusing 1/15/2020  8:22 AM                           Dressing/Packing:  Wound Knee Left-Dressing Type: Aquacel; Cast padding;Elastic bandage;Staples (01/15/20 1129)

## 2020-01-15 NOTE — PERIOP NOTES
TRANSFER - OUT REPORT:    Verbal report given to Mehreen(name) on Sumi Manner  being transferred to 568(unit) for routine post - op       Report consisted of patients Situation, Background, Assessment and   Recommendations(SBAR). Time Pre op antibiotic given:0924  Anesthesia Stop time: 1200  Muir Present on Transfer to floor:no  Order for Muir on Chart:no  Discharge Prescriptions with Chart:no    Information from the following report(s) Procedure Summary and Accordion was reviewed with the receiving nurse. Opportunity for questions and clarification was provided. Is the patient on 02? NO       Is the patient on a monitor? NO    Is the nurse transporting with the patient? NO    Surgical Waiting Area notified of patient's transfer from PACU?  YES      The following personal items collected during your admission accompanied patient upon transfer:   Dental Appliance: Dental Appliances: None  Vision:    Hearing Aid:    Jewelry:    Clothing: Clothing: (clothing bag, glasses to pacu)  Other Valuables:    Valuables sent to safe:      1 bag of clothing and glasses returnedd to pt in pacu

## 2020-01-15 NOTE — H&P
Date of Surgery Update:  Jayesh Frost was seen and examined. History and physical has been reviewed. The patient has been examined. There have been no significant clinical changes since the completion of the originally dated History and Physical.  Patient identified by surgeon; surgical site was confirmed by patient and surgeon.     Signed By: Sivan Rodgers MD     January 15, 2020 8:00 AM

## 2020-01-15 NOTE — PROGRESS NOTES
TRANSFER - IN REPORT:    Verbal report received from Patrick Chacko RN(name) on Silvana Zhu  being received from PACU(unit) for routine post - op      Report consisted of patients Situation, Background, Assessment and   Recommendations(SBAR). Information from the following report(s) SBAR, Kardex, OR Summary, Intake/Output, MAR and Recent Results was reviewed with the receiving nurse. Opportunity for questions and clarification was provided. Assessment completed upon patients arrival to unit and care assumed.

## 2020-01-15 NOTE — PROGRESS NOTES
Occupational Therapy   Orders received, chart review completed. Note patient POD #0 from L TKA. OT will follow up tomorrow for evaluation. Recommend OOB to chair three times a day for meals and self-completion of ADLs as able and medically stable. Thank you.

## 2020-01-15 NOTE — ANESTHESIA PROCEDURE NOTES
Spinal Block    Start time: 1/15/2020 9:20 AM  End time: 1/15/2020 9:28 AM  Performed by: Tru Sandoval CRNA  Authorized by: Tyler Solano MD     Pre-procedure:   Indications: primary anesthetic  Preanesthetic Checklist: patient identified, risks and benefits discussed, anesthesia consent, site marked, patient being monitored and timeout performed    Timeout Time: 09:17          Spinal Block:   Patient Position:  Seated  Prep Region:  Lumbar  Prep: Betadine      Location:  L3-4  Technique:  Single shot        Needle:   Needle Type:  Pencan  Needle Gauge:  24 G  Attempts:  2      Events: CSF confirmed, no blood with aspiration and no paresthesia        Assessment:  Insertion:  Uncomplicated  Patient tolerance:  Patient tolerated the procedure well with no immediate complications

## 2020-01-15 NOTE — OP NOTES
Name: Renae Madrid  MRN:  [id number]  : 1955  Age:  72 y.o. Surgery Date: 1/15/2020      OPERATIVE REPORT - LEFT TOTAL KNEE REPLACEMENT    PREOPERATIVE DIAGNOSIS: Osteoarthritis, left knee. POSTOPERATIVE DIAGNOSIS: Osteoarthritis, left knee. PROCEDURE PERFORMED: Left total knee arthroplasty. SURGEON: Karen Navarro MD    FIRST ASSISTANTJairo Lu    ANESTHESIA: Spinal    PRE-OP ANTIBIOTIC: Ancef 3g    COMPLICATIONS: None. ESTIMATED BLOOD LOSS: 100 mL. SPECIMENS REMOVED: None. COMPONENTS IMPLANTED:   Implant Name Type Inv. Item Serial No.  Lot No. LRB No. Used Action   CEMENT BNE GENTAMC GHV 40GM -- SMARTSET - SN/A  CEMENT BNE GENTAMC GHV 40GM -- SMARTSET N/A [facility] [employer] [id number] Left 2 Implanted   PAT BEBO DOME MEDIAL 35MM -- ATTUNE - SN/A Joint Component PAT BEBO DOME MEDIAL 35MM -- ATTUNE N/A [facility] [employer] [id number] Left 1 Implanted   STEM BEBO [id number] -- ATTUNE - SN/A Joint Component STEM BEBO [id number] -- ATTUNE N/A [facility] [employer] [id number] Left 1 Implanted   FEM CR LT SZ 7 BEBO -- ATTUNE - SN/A Joint Component FEM CR LT SZ 7 BEBO -- ATTUNE N/A [facility] [employer] [id number] Left 1 Implanted   TIB CRS FB BASE SZ 7 BEBO -- ATTUNE - SN/A Joint Component TIB CRS FB BASE SZ 7 BEBO -- ATTUNE N/A [facility] [id number] Left 1 Implanted   INSERT FB SZ 7 6MM -- ATTUNE - SN/A Joint Component INSERT FB SZ 7 6MM -- ATTUNE N/A [facility] [employer] [id number] Left 1 Implanted       INDICATIONS: The patient is an 72 yrs female with progressive debilitating left knee pain due to severe osteoarthritis. Symptoms have progressed despite comprehensive conservative treatment and they presents for left total knee replacement. Risks, benefits, alternatives of the procedure were reviewed in detail and the patient desired to proceed. Risks including bleeding, infection, damage to surrounding structures, blood clots, pulmonary embolism, and death were discussed.  The patient understood understands the increased risk for perioperative medical complications due to his medical comorbidities. DESCRIPTION OF PROCEDURE:DESCRIPTION OF PROCEDURE: Epidural/spinal anesthesia was initiated. Two grams of cefazolin were administered within 30 minutes of incision. The left lower extremity was prepped and draped in the usual sterile fashion. The skin was covered with Ioban occlusive dressing. A tourniquet was only employed for cementation- total time- 16 minutes. A midline skin incision was made with a 10 blade and small flaps were elevated. A medial parapatellar incision was made to the knee. The knee was exposed and the distal femur was cut at 5 degrees distal femoral valgus. The tibia was subluxed and a neutral varus/valgus proximal tibial cut was made matching the patient's slope. The meniscal remnants were removed. The posterior osteophytes were removed from the femur. The extension gap was determined using spacer blocks and appropriate releases were made. Femur was sized per above. An AP cutting block was placed, rotated using a gap balancing techniqe. Anterior, posterior, and chamfer cuts were carried out. The tibial was then sized and correct rotation was identified using the medial 1/3 of the tibial tubercle as a landmark. The tibia was prepared for a stem  using a drill followed by a keel punch. A reciprocating saw was used to begin the cuts for the keel punch to avoid tibial fracture. Trials were placed. The patella was sized using a caliper and an appropriate resection  was performed. Lug holes were drilled and a trial was fitted. The knee tracked well with all trial implants. The trials were then removed. Bony surfaces were drilled, lavaged, and dried. All components were cemented. Excess cement was removed. Polyethylene component was placed. After the cement had fully cured, the knee was ranged and  irrigated copiously with pulsatile lavage.      All surrounding soft tissues were infiltrated with local anesthetic. The arthrotomy  was closed with running quill suture and 0 vicryl suture. Skin and subcutaneous tissues were irrigated and closed in standard fashion with 2-0 vicryl and 3-0 monocryl. An aquacel dressing was placed. The patient underwent straight catheterization at the end of the case. There were no complications. The procedure was a LEFT TOTAL KNEE REPLACEMENT. A Depuy total knee construct was utilized. No specimens were obtained/sent. The patient was transferred to the recovery room in stable condition.       Grey Smith MD

## 2020-01-16 ENCOUNTER — HOME HEALTH ADMISSION (OUTPATIENT)
Dept: HOME HEALTH SERVICES | Facility: HOME HEALTH | Age: 65
End: 2020-01-16
Payer: MEDICARE

## 2020-01-16 VITALS
DIASTOLIC BLOOD PRESSURE: 66 MMHG | HEIGHT: 65 IN | OXYGEN SATURATION: 96 % | RESPIRATION RATE: 16 BRPM | HEART RATE: 90 BPM | SYSTOLIC BLOOD PRESSURE: 112 MMHG | WEIGHT: 236.99 LBS | TEMPERATURE: 98.2 F | BODY MASS INDEX: 39.49 KG/M2

## 2020-01-16 LAB
ANION GAP SERPL CALC-SCNC: 7 MMOL/L (ref 5–15)
BUN SERPL-MCNC: 18 MG/DL (ref 6–20)
BUN/CREAT SERPL: 24 (ref 12–20)
CALCIUM SERPL-MCNC: 8.2 MG/DL (ref 8.5–10.1)
CHLORIDE SERPL-SCNC: 104 MMOL/L (ref 97–108)
CO2 SERPL-SCNC: 26 MMOL/L (ref 21–32)
CREAT SERPL-MCNC: 0.76 MG/DL (ref 0.55–1.02)
GLUCOSE SERPL-MCNC: 118 MG/DL (ref 65–100)
HGB BLD-MCNC: 10.8 G/DL (ref 11.5–16)
POTASSIUM SERPL-SCNC: 3.4 MMOL/L (ref 3.5–5.1)
SODIUM SERPL-SCNC: 137 MMOL/L (ref 136–145)

## 2020-01-16 PROCEDURE — 74011250637 HC RX REV CODE- 250/637: Performed by: ORTHOPAEDIC SURGERY

## 2020-01-16 PROCEDURE — 97116 GAIT TRAINING THERAPY: CPT

## 2020-01-16 PROCEDURE — 97535 SELF CARE MNGMENT TRAINING: CPT

## 2020-01-16 PROCEDURE — 80048 BASIC METABOLIC PNL TOTAL CA: CPT

## 2020-01-16 PROCEDURE — 74011250636 HC RX REV CODE- 250/636: Performed by: ORTHOPAEDIC SURGERY

## 2020-01-16 PROCEDURE — 85018 HEMOGLOBIN: CPT

## 2020-01-16 PROCEDURE — 97165 OT EVAL LOW COMPLEX 30 MIN: CPT

## 2020-01-16 PROCEDURE — 36415 COLL VENOUS BLD VENIPUNCTURE: CPT

## 2020-01-16 RX ORDER — OXYCODONE HYDROCHLORIDE 5 MG/1
5 TABLET ORAL
Qty: 60 TAB | Refills: 0 | Status: SHIPPED | OUTPATIENT
Start: 2020-01-16 | End: 2020-02-15

## 2020-01-16 RX ORDER — MELOXICAM 7.5 MG/1
7.5 TABLET ORAL DAILY
Qty: 60 TAB | Refills: 0 | Status: SHIPPED | OUTPATIENT
Start: 2020-01-16

## 2020-01-16 RX ORDER — TRAMADOL HYDROCHLORIDE 50 MG/1
50 TABLET ORAL
Qty: 60 TAB | Refills: 0 | Status: SHIPPED | OUTPATIENT
Start: 2020-01-16 | End: 2020-02-15

## 2020-01-16 RX ORDER — FAMOTIDINE 20 MG/1
20 TABLET, FILM COATED ORAL 2 TIMES DAILY
Qty: 60 TAB | Refills: 0 | Status: SHIPPED | OUTPATIENT
Start: 2020-01-16

## 2020-01-16 RX ORDER — ASPIRIN 81 MG/1
81 TABLET ORAL 2 TIMES DAILY
Qty: 60 TAB | Refills: 0 | Status: SHIPPED | OUTPATIENT
Start: 2020-01-16

## 2020-01-16 RX ADMIN — FOLIC ACID TAB 400 MCG 0.8 MG: 400 TAB at 08:31

## 2020-01-16 RX ADMIN — Medication 10 ML: at 14:08

## 2020-01-16 RX ADMIN — MELATONIN 1 TABLET: at 08:32

## 2020-01-16 RX ADMIN — FAMOTIDINE 20 MG: 20 TABLET ORAL at 08:32

## 2020-01-16 RX ADMIN — MAGNESIUM OXIDE TAB 400 MG (241.3 MG ELEMENTAL MG) 400 MG: 400 (241.3 MG) TAB at 08:32

## 2020-01-16 RX ADMIN — POLYETHYLENE GLYCOL 3350 17 G: 17 POWDER, FOR SOLUTION ORAL at 08:32

## 2020-01-16 RX ADMIN — Medication 10 ML: at 06:43

## 2020-01-16 RX ADMIN — THERA TABS 1 TABLET: TAB at 08:32

## 2020-01-16 RX ADMIN — SENNOSIDES AND DOCUSATE SODIUM 1 TABLET: 8.6; 5 TABLET ORAL at 08:32

## 2020-01-16 RX ADMIN — Medication 2 G: at 02:39

## 2020-01-16 RX ADMIN — ASPIRIN 81 MG: 81 TABLET, COATED ORAL at 08:31

## 2020-01-16 RX ADMIN — KETOROLAC TROMETHAMINE 15 MG: 30 INJECTION, SOLUTION INTRAMUSCULAR at 06:42

## 2020-01-16 RX ADMIN — TRAMADOL HYDROCHLORIDE 50 MG: 50 TABLET, FILM COATED ORAL at 12:00

## 2020-01-16 RX ADMIN — KETOROLAC TROMETHAMINE 15 MG: 30 INJECTION, SOLUTION INTRAMUSCULAR at 02:39

## 2020-01-16 RX ADMIN — ACETAMINOPHEN 650 MG: 325 TABLET ORAL at 11:01

## 2020-01-16 RX ADMIN — CALCIUM 500 MG: 500 TABLET ORAL at 08:31

## 2020-01-16 RX ADMIN — KETOROLAC TROMETHAMINE 15 MG: 30 INJECTION, SOLUTION INTRAMUSCULAR at 14:06

## 2020-01-16 RX ADMIN — ACETAMINOPHEN 650 MG: 325 TABLET ORAL at 06:42

## 2020-01-16 NOTE — PROGRESS NOTES
I have reviewed discharge instructions with the patient and daughter. The patient and daughter verbalized understanding. All questions answered. Given extra dry dressings, taken down to patient discharge by volunteers.

## 2020-01-16 NOTE — PROGRESS NOTES
Resting comfortably      GEN:  NAD.  AOx3   ABD:  S/NT/ND   LLE:  Dressing C/D/I , 5/5 motor, Calf nttp (Bilat), Sensation rossly intact to light touch throughout, 1+ dp/pt pulses, foot perfused    Patient Vitals for the past 24 hrs:   Temp Pulse Resp BP SpO2   01/16/20 0821 98.2 °F (36.8 °C) 90 16 112/66 96 %   01/16/20 0438 97.7 °F (36.5 °C) 83 16 123/77 94 %   01/15/20 2332 97.7 °F (36.5 °C)  16 118/77 95 %   01/15/20 2030 98 °F (36.7 °C) 87 15 149/83 97 %   01/15/20 1920 97.9 °F (36.6 °C) 91 16 116/73 97 %   01/15/20 1815 98 °F (36.7 °C) 90 16 125/79 96 %   01/15/20 1711 97.8 °F (36.6 °C) 83 16 (!) 149/94 97 %   01/15/20 1639  93  (!) 153/92    01/15/20 1636  88  146/90    01/15/20 1633  77  126/80    01/15/20 1609 98.2 °F (36.8 °C) 77 14 139/76 94 %   01/15/20 1500  68 10 126/68 100 %   01/15/20 1440  69 14  100 %   01/15/20 1435  68 10  100 %   01/15/20 1430  69 17 114/61 100 %   01/15/20 1425  69 18  99 %   01/15/20 1420  67 12  96 %   01/15/20 1415  68 17  94 %   01/15/20 1410  67 20  92 %   01/15/20 1405  68 15  100 %   01/15/20 1400  67 14 123/56 99 %   01/15/20 1355  66 14  100 %   01/15/20 1350  67 11  100 %   01/15/20 1345  63 12  99 %   01/15/20 1340  66 13  100 %   01/15/20 1335  68 14  100 %   01/15/20 1330  69 17  92 %   01/15/20 1325  71 13  95 %   01/15/20 1320  68 15  94 %   01/15/20 1315  69 14  98 %   01/15/20 1310  68 14  98 %   01/15/20 1305  68 14  96 %   01/15/20 1303 97.6 °F (36.4 °C)       01/15/20 1300  67 14  100 %   01/15/20 1255  67 12  97 %   01/15/20 1250  68 16  95 %   01/15/20 1245  67 17 120/61 96 %   01/15/20 1240  67 14  95 %   01/15/20 1235  68 15  95 %   01/15/20 1230  70 14 120/61 98 %   01/15/20 1225  67 15  98 %   01/15/20 1220  68 16  97 %   01/15/20 1215  68 18 106/57 96 %   01/15/20 1210  70 19 106/58 94 %   01/15/20 1205  64 18 118/54 99 %   01/15/20 1203 97.5 °F (36.4 °C) 68 16 97/49 98 % 01/15/20 1200    97/49        Current Facility-Administered Medications   Medication Dose Route Frequency    calcium carbonate (OS-ABDIRASHID) tablet 500 mg [elemental]  500 mg Oral DAILY    cholecalciferol (VITAMIN D3) (1000 Units /25 mcg) tablet 1 Tab  1,000 Units Oral DAILY    folic acid tablet 0.8 mg  800 mcg Oral DAILY    indapamide (LOZOL) tablet 2.5 mg  2.5 mg Oral DAILY    magnesium oxide (MAG-OX) tablet 400 mg  400 mg Oral DAILY    therapeutic multivitamin (THERAGRAN) tablet 1 Tab  1 Tab Oral DAILY    0.9% sodium chloride infusion  125 mL/hr IntraVENous CONTINUOUS    sodium chloride 0.9 % bolus infusion 500 mL  500 mL IntraVENous ONCE PRN    sodium chloride (NS) flush 5-40 mL  5-40 mL IntraVENous Q8H    sodium chloride (NS) flush 5-40 mL  5-40 mL IntraVENous PRN    acetaminophen (TYLENOL) tablet 650 mg  650 mg Oral Q6H    oxyCODONE IR (ROXICODONE) tablet 5 mg  5 mg Oral Q3H PRN    HYDROmorphone (PF) (DILAUDID) injection 0.5 mg  0.5 mg IntraVENous Q4H PRN    naloxone (NARCAN) injection 0.4 mg  0.4 mg IntraVENous PRN    hydroxyzine HCL (ATARAX) tablet 10 mg  10 mg Oral Q8H PRN    famotidine (PEPCID) tablet 20 mg  20 mg Oral BID    senna-docusate (PERICOLACE) 8.6-50 mg per tablet 1 Tab  1 Tab Oral BID    polyethylene glycol (MIRALAX) packet 17 g  17 g Oral DAILY    bisacodyL (DULCOLAX) suppository 10 mg  10 mg Rectal DAILY PRN    aspirin delayed-release tablet 81 mg  81 mg Oral BID    ketorolac (TORADOL) injection 15 mg  15 mg IntraVENous Q6H    ondansetron (ZOFRAN) injection 4 mg  4 mg IntraVENous Q4H PRN    traMADol (ULTRAM) tablet 50 mg  50 mg Oral Q6H PRN       Lab Results   Component Value Date/Time    HGB 10.8 (L) 01/16/2020 02:55 AM    INR 1.0 01/06/2020 12:00 PM       Lab Results   Component Value Date/Time    Sodium 137 01/16/2020 02:55 AM    Potassium 3.4 (L) 01/16/2020 02:55 AM    Chloride 104 01/16/2020 02:55 AM    CO2 26 01/16/2020 02:55 AM    BUN 18 01/16/2020 02:55 AM Creatinine 0.76 01/16/2020 02:55 AM    Calcium 8.2 (L) 01/16/2020 02:55 AM            72 y.o. female s/p left total knee arthroplasty on 1/15/2020  . Doing well.        ABX: Complete 24 hours perioperative abx  PATHWAY: Straight cath per protocol if needed  DVT Prophylaxis: Aspirin 81 mg enteric coated BID  Weight Bearing: WBAT LLE   Pain Control: Toradol, Tylenol, 15 mg meloxicam to begin evening POD 1 if patient still in hospital, tramadol every 6 hours, oxy 5 mg for breakthrough pain  Disposition: Home, [facility]

## 2020-01-16 NOTE — DISCHARGE INSTRUCTIONS
Discharge Instructions Knee Replacement  Dr. Crow Rodriguez    Patient Name: Louisa Kinsey  Date of procedure:1/15/2020                                   Procedure:Procedure(s):  LEFT TOTAL KNEE ARTHROPLASTY  Surgeon:Surgeon(s) and Role:     * Lynette Urbano MD - Primary               PCP: Marcela Iqbal MD  Date of discharge: No discharge date for patient encounter. Follow up appointments   Follow up with Dr. Crow Rodriguez in 4 weeks. Call [phone] extension [phone] Naheed Cotto) to make an appointment.  If home health has been arranged for you the agency will contact you to arrange dates/times for visits. Please call them if you do not hear from them within 24 hours after you are discharged. When to call your Orthopaedic Surgeon: Call [phone]. If you need to reach us after 5pm or on a weekend call [phone] and the on call physician will be contacted.  Signs of infection-if your incision is red; continues to have drainage; drainage has a foul odor or if you have a persistent fever over 101 degrees   Signs of a blood clot in your leg-calf pain, tenderness, redness, swelling of lower leg    When to call your Primary Care Physician:   Concerns about medical conditions such as diabetes, high blood pressure, asthma, congestive heart failure   Call if blood sugars are elevated, persistent headache or dizziness, coughing or congestion, constipation or diarrhea, burning with urination, abnormal heart rate     When to call [phone] and go to the nearest emergency room   Sudden onset of chest pain, shortness of breath, difficulty breathing     Activity   Weight bearing as tolerated with walker or crutches putting as much weight on your leg as you can tolerate.  Refer to your handbook for instructions and pictures   Complete your Home Exercise Program daily as instructed by the physical therapist.  Refer to your handbook for instructions and pictures   Get up every one hour and walk (except at night when sleeping)   Do not drive or operate heavy machinery    Incision Care   The Aquacel (brown, waterproof) surgical dressing is to remain on your knee for 7 days. On the 7th day have someone gently peel the dressing off by carefully lifting the edge and stretching it slightly to break the adhesive seal and leave incision open to air. You may take a shower with the Aquacel dressing in place   Once the Aquacel is removed, you may get your incision wet but do not submerge your incision under water in a bath tub, hot tub or swimming pool for 8 weeks after surgery. Preventing blood clots    Take aspirin 81 mg twice daily as prescribed for one month following surgery        Pain management   Please take scheduled 650 mg tylenol every 6 hours for 6 weeks   Please take scheduled meloxicam 7.5 mg daily for 6 weeks to decrease swelling, pain, and inflammation   Please take tramadol every 6 hours for pain as needed for pain.  You will be given a prescription for 5 mg tablets of oxycodone. If you have severe pain 1 hour after taking tramadol, you may also take 1 tablet of oxycodone.  Oxycodone can cause nausea and constipation- so please use sparingly and you may stop use as soon as your pain in reasonably controlled     While taking aspirin and meloxicam, please take famotidine (PEPCID) 20 mg twice daily as prescribed to prevent stomach ulcers/irritation.  If you have a history of stomach ulcers, do not take meloxicam.      Avoid alcoholic beverages while taking pain medication   Do not take any over-the-counter medication for pain except Tylenol (acetaminophen)   Keep ice wrap in place except when walking.  Change gel packs every 4 hours   Lie down and elevate your leg on pillows for about 30 minutes after walking to decrease swelling and pain       Diet   Resume usual diet; drink plenty of fluids; eat foods high in fiber   Please take a stool softener (such as Senokot-S or Colace) to prevent constipation while you are taking oxycodone. If constipation occurs, take a laxative (such as Dulcolax tablets, Milk of Magnesia, or a suppository).

## 2020-01-16 NOTE — PROGRESS NOTES
Occupational Therapy  Orders received, chart reviewed and patient evaluated by occupational therapy. Pending progression with skilled acute occupational therapy, recommend:  No skilled occupational therapy/ follow up rehabilitation needs identified at this time. Cleared for home with support from family from OT standpoint. Recommend with nursing patient to complete as able in order to maintain strength, endurance and independence: OOB to chair 3x/day with RW and supervision and functional mobility to the bathroom with RW with supervision. Thank you for your assistance. Full evaluation to follow.        Komal Hernandes OT

## 2020-01-16 NOTE — PROGRESS NOTES
Problem: Patient Education: Go to Patient Education Activity  Goal: Patient/Family Education  Outcome: Progressing Towards Goal     Problem: Knee Replacement: Day of Surgery/Unit  Goal: Activity/Safety  Outcome: Progressing Towards Goal  Note:   Initialize mobilization (This RN or PT)  Assist patient with ambulation with walker  Patient to call for assistance  Call bell within reach  Goal: *Initiate mobility  Outcome: Progressing Towards Goal  Note:   This RN or PT will initialize mobilization  Goal: *Optimal pain control at patient's stated goal  Outcome: Progressing Towards Goal  Note:   Assess pain level and characteristics using numeric pain scale  Administer pain medication as ordered  Reassess pain level and characteristics using numeric pain scale  Goal: *Hemodynamically stable  Outcome: Progressing Towards Goal  Note:   Assess vitals every hour for the first four hours post op  Monitor for changes in vitals

## 2020-01-16 NOTE — ROUTINE PROCESS
Bedside shift change report given to Waldemar Archer (oncoming nurse) by Shaista Carvajal (offgoing nurse). Report included the following information SBAR.

## 2020-01-16 NOTE — PROGRESS NOTES
TRANSITION OF CARE  Home with home health: [facility]. Care Management Interventions  PCP Verified by CM: Yes(KENNY Dior MD)  Transition of Care Consult (CM Consult): [address]: Yes  Physical Therapy Consult: Yes  Current Support Network: Relative's Home  Confirm Follow Up Transport: Family  The Plan for Transition of Care is Related to the Following Treatment Goals : home with home health  The Patient and/or Patient Representative was Provided with a Choice of Provider and Agrees with the Discharge Plan?: Yes  Name of the Patient Representative Who was Provided with a Choice of Provider and Agrees with the Discharge Plan: patient  Freedom of Choice List was Provided with Basic Dialogue that Supports the Patient's Individualized Plan of Care/Goals, Treatment Preferences and Shares the Quality Data Associated with the Providers?: Yes  Discharge Location  Discharge Placement: Home with home health    Reason for Admission:   Osteoarthritis of left knee             S/P left knee arthroplasty                   RRAT Score:            5           Plan for utilizing home health: [facility]                     Current Advanced Directive/Advance Care Plan: Not on file                         Transition of Care Plan:                    CM met with patient and her daughter. Patient lives with her daughter, son in law, and granddaughter. Patient reports good family /social support. Patient was ambulatory prior to admission and expects return to independent functioning. Patient confirmed PCP, health insurance, and prescription coverage. Transport at discharge will be provided bydaughter. CM received consult for home health services. CM offered patient choice of providers. Patient selected [facility] and signed Freedom of Choice form which CM placed on chart. CM sent referral via [employer] to [facility], and referral was accepted.  CM updated AVS and informed patient and staff nurse. No other discharge planning needs presented at this time. CM gave patient the Patient Guide to Observation and Outpatient Care which patient signed--original to chart and copy to patient. CM gave patient the Medicare Outpatient Observation Notice which patient signed-- original to chart and copy to patient.

## 2020-01-16 NOTE — PROGRESS NOTES
OCCUPATIONAL THERAPY EVALUATION/DISCHARGE  Patient: Jane Ulrich (41 y.o. female)  Date: 1/16/2020  Primary Diagnosis: Osteoarthritis of left knee [M17.12]  Procedure(s) (LRB):  LEFT TOTAL KNEE ARTHROPLASTY (Left) 1 Day Post-Op   Precautions: Fall       ASSESSMENT  Based on the objective data described below, the patient presents with ability to complete basic ADL tasks with supervision s/p L TKA POD 1. She lives with her dtr and son in law in a tri level and was mod indep with ADL tasks. She reports attending ACAC class prior to surgery with 25# weight loss. Hx of RA and lymphedema in which she has thigh high garments she wears. Provided education on RW use, home safety, knee precautions, and ADL compensatory techniques with good understanding. She ambulated to the bathroom with supervision with RW, LB dressing for pants with supervision, and toileting transfer with supervision. Mild trunk forward flexion with ambulation at RW, she reports this is her baseline. She owns sock aide and reacher. She is cleared from OT standpoint for dc home with support from daughter. Current Level of Function (ADLs/self-care): UB care indep, LB care supervision, toileting supervision    Functional Outcome Measure: The patient scored 85/100 on the Barthel Index outcome measure which is indicative of mild impairments with ADL tasks. Other factors to consider for discharge: Patient highly independent prior to admission despite lymphedema and RA. PLAN :  Recommendation for discharge: (in order for the patient to meet his/her long term goals)  No skilled occupational therapy/ follow up rehabilitation needs identified at this time.     This discharge recommendation:  Has been made in collaboration with the attending provider and/or case management    IF patient discharges home will need the following DME: patient owns DME required for discharge       SUBJECTIVE:   Patient stated I have a sock tool, it looks different.     OBJECTIVE DATA SUMMARY:   HISTORY:   Past Medical History:   Diagnosis Date    Anxiety     Benign heart murmur     Hypertension     Lymphedema     bilateral LE    RA (rheumatoid arthritis) (HCC)      Past Surgical History:   Procedure Laterality Date    HX COLONOSCOPY      HX TONSILLECTOMY         Prior Level of Function/Environment/Context: Home with daughter and son in law. Mod indep with ADL tasks. Expanded or extensive additional review of patient history:     Home Situation  Home Environment: Private residence  # Steps to Enter: 3  Rails to [employer]: No  One/Two Story Residence: Two story  # of Interior Steps: 6  Interior Rails: Right  Living Alone: No  Support Systems: Family member(s), Child(amber)  Patient Expects to be Discharged to[de-identified] Private residence  Current DME Used/Available at Home: Rheta Hark, rolling, Cane, straight, Adaptive dressing aides  Tub or Shower Type: Tub/Shower combination    Hand dominance: Right    EXAMINATION OF PERFORMANCE DEFICITS:  Cognitive/Behavioral Status:  Neurologic State: Alert        Hearing: Auditory  Auditory Impairment: None         Range of Motion:  AROM: Generally decreased, functional  PROM: Generally decreased, functional        Strength:  Strength: Generally decreased, functional     Coordination:  Coordination: Generally decreased, functional  Fine Motor Skills-Upper: Left Intact; Right Intact(hx RA)    Gross Motor Skills-Upper: Left Intact; Right Intact    Tone & Sensation:  Tone: Normal  Sensation: Intact     Balance:  Sitting: Intact  Standing: Intact; With support    Functional Mobility and Transfers for ADLs:  Bed Mobility:  Supine to Sit: Supervision  Sit to Supine: (remained OOB in chair)    Transfers:  Sit to Stand: Supervision  Stand to Sit: Supervision  Bed to Chair: Supervision  Bathroom Mobility: Supervision/set up(RW)  Toilet Transfer : Supervision(RW)    ADL Assessment:  Feeding: Independent  Oral Facial Hygiene/Grooming: Supervision  Bathing: Supervision; Additional time  Upper Body Dressing: Independent  Lower Body Dressing: Supervision  Toileting: Supervision(RW)        ADL Intervention and task modifications:     Grooming  Washing Face: Supervision(standing)  Washing Hands: Supervision    Lower Body Dressing Assistance  Underpants: Supervision  Pants With Elastic Waist: Supervision    Toileting  Bladder Hygiene: Independent  Clothing Management: Supervision    Bathing: Patient instructed and indicated understanding when bathing to not submerge wound in water, stand to shower or sponge bathe, cover wound with plastic and tape to ensure no water reaches bandage/wound without cues. Dressing joint: Patient instructed and demonstrated understanding to don/doff Left LE first/last with Supervision. Patient instructed and demonstrated to don all clothing while sitting prior to standing, doff all clothing to knees while standing, then sit to doff clothing off from knees to feet in order to facilitate fall prevention, pain management, and energy conservation with Supervision. Home safety: Patient instructed and indicated understanding on home modifications and safety (raise height of ADL objects, appropriate height of chair surfaces, recliner safety, change of floor surfaces, clear pathways) to increase independence and fall prevention. Standing: Patient instructed and demonstrated during ADLs to walk up to sink/counter top/surfaces, step into walker to increase safety of joint and fall prevention with Supervision. Patient educated about knee anatomy and educated to avoid rotation of Left LE. Instructed to apply concept to ADLs within the home (no twisting of knee during reaching across body, square off while using objects, slide objects along surfaces).   Patient instructed and indicated understanding to increase amount of time standing, observe standing position during ADLs in order to increase even weight bearing through bilateral LEs in order to increase independence with ADLs. Goal to be reached 30 days post - op, per orthopedic surgeon or per PT. Tub/shower transfer: Patient instructed and indicated understanding regarding when it is safe to begin transfer into tub/shower (complete stairs with PT, advance exercises with PT high enough to clear tub/shower height). Patient instructed to use the same technique as used with stairs when entering and exiting tub/shower (\"up with the non-surgical, down with the surgical leg\"). Functional Measure:  Barthel Index:    Bathin  Bladder: 10  Bowels: 10  Groomin  Dressing: 10  Feeding: 10  Mobility: 10  Stairs: 5  Toilet Use: 10  Transfer (Bed to Chair and Back): 10  Total: 85/100        The Barthel ADL Index: Guidelines  1. The index should be used as a record of what a patient does, not as a record of what a patient could do. 2. The main aim is to establish degree of independence from any help, physical or verbal, however minor and for whatever reason. 3. The need for supervision renders the patient not independent. 4. A patient's performance should be established using the best available evidence. Asking the patient, friends/relatives and nurses are the usual sources, but direct observation and common sense are also important. However direct testing is not needed. 5. Usually the patient's performance over the preceding 24-48 hours is important, but occasionally longer periods will be relevant. 6. Middle categories imply that the patient supplies over 50 per cent of the effort. 7. Use of aids to be independent is allowed. Yasemin Porter., Barthel, D.W. ([id number]). Functional evaluation: the Barthel Index. [address] [facility] (14)2. [facility] dom[facility]ica DIEGO Johnson, Lupe Valentín., Ashwini [place]., Karissa, [address] (). Measuring the change indisability after inpatient rehabilitation; comparison of the responsiveness of the Barthel Index and Functional [place] Measure.  Journal of Neurology, Neurosurgery, and Psychiatry, 664), 146-269. MARLEEN Machado, GISELE Peguero, & Beltran Morales M.A. (2004.) Assessment of post-stroke quality of life in cost-effectiveness studies: The usefulness of the Barthel Index and the EuroQoL-5D. Quality of Life Research, 15, 696-02         Occupational Therapy Evaluation Charge Determination   History Examination Decision-Making   LOW Complexity : Brief history review  LOW Complexity : 1-3 performance deficits relating to physical, cognitive , or psychosocial skils that result in activity limitations and / or participation restrictions  LOW Complexity : No comorbidities that affect functional and no verbal or physical assistance needed to complete eval tasks       Based on the above components, the patient evaluation is determined to be of the following complexity level: LOW   Pain Rating:  Denies pain- reports mild soreness    Activity Tolerance:   Good  Please refer to the flowsheet for vital signs taken during this treatment. After treatment patient left in no apparent distress:    Sitting in chair, Call bell within reach and Caregiver / family present    COMMUNICATION/EDUCATION:   The patients plan of care was discussed with: Physical Therapy Assistant and Registered Nurse.     Thank you for this referral.  Mahesh Lamb, OT  Time Calculation: 26 mins

## 2020-01-16 NOTE — PROGRESS NOTES
Problem: Mobility Impaired (Adult and Pediatric)  Goal: *Acute Goals and Plan of Care (Insert Text)  Description  FUNCTIONAL STATUS PRIOR TO ADMISSION: Patient was independent and active without use of DME.    HOME SUPPORT PRIOR TO ADMISSION: The patient lived alone with no local support. Physical Therapy Goals  Initiated 1/15/2020    1. Patient will move from supine to sit and sit to supine , scoot up and down and roll side to side in bed with modified independence within 4 days. 2. Patient will perform sit to stand with modified independence within 4 days. 3. Patient will ambulate with modified independence for 300 feet with the least restrictive device within 4 days. 4. Patient will ascend/descend 14 stairs with 1 handrail(s) with supervision/set-up within 4 days. 5. Patient will perform home exercise program per protocol with modified independence within 4 days. 6. Patient will demonstrate AROM 0-90 degrees in operative joint within 4 days. Outcome: Progressing Towards Goal  PHYSICAL THERAPY NOTE  Patient: Sandra Lee (89 y.o. female)  Date: 1/16/2020  Primary Diagnosis: Osteoarthritis of left knee [M17.12]  Procedure(s) (LRB):  LEFT TOTAL KNEE ARTHROPLASTY (Left) 1 Day Post-Op   Precautions: Fall, WBAT  Sandra Lee was seen for morning PT session. Pain managed at this time; reports mild aches but other now quinonez has n bomplaints. She is walking approx 250Ft  feet with RW and Superivision . Patient was instructed in stair management and able to negotiate 4 stairs using single rail and SPC In opposite hand for support CG assistance. Reviewed activity recommendations and restrictions with patient. Sandra Lee is clear for discharge home w/ HHPT from a mobility standpoint and RN is aware.       Morning session functional mobility:  Bed Mobility:     Supine to Sit: (received OOB in chair)  Sit to Supine: (remained OOB in chair)     Transfers:  Sit to Stand: Stand-by assistance  Stand to Sit: Stand-by assistance                       Balance:   Sitting: Intact  Standing: Intact; With support  Ambulation/Gait Training:  Distance (ft): 250 Feet (ft)  Assistive Device: Gait belt;Walker, rolling  Ambulation - Level of Assistance: Supervision        Gait Abnormalities: Antalgic;Decreased step clearance  Right Side Weight Bearing: Full  Left Side Weight Bearing: As tolerated  Base of Support: Widened     Speed/Lashanda: Pace decreased (<100 feet/min)  Step Length: Right lengthened                 Stairs:     Stairs - Level of Assistance: Contact guard assistance(x1; SPC in oppposite hand)       Thank you,  Ade DURAND Means,PTA   Time Calculation: 23 mins

## 2020-01-16 NOTE — PROGRESS NOTES
Primary Nurse Chauncey Wallace and Dora, RN performed a dual skin assessment on this patient No impairment noted  Morgan score is 20        2025-Bedside and Verbal shift change report given to Bernice Edouard RN (oncoming nurse) by Melinda Collazo RN (offgoing nurse). Report included the following information SBAR, Kardex, OR Summary, Intake/Output, MAR and Recent Results.

## 2020-01-16 NOTE — PROGRESS NOTES
Problem: Falls - Risk of  Goal: *Absence of Falls  Description  Document Maulik Nelson Fall Risk and appropriate interventions in the flowsheet.   Outcome: Progressing Towards Goal  Note: Fall Risk Interventions:  Mobility Interventions: PT Consult for mobility concerns, Patient to call before getting OOB         Medication Interventions: Patient to call before getting OOB    Elimination Interventions: Call light in reach              Problem: Knee Replacement: Post-Op Day 1  Goal: *Hemodynamically stable  Outcome: Progressing Towards Goal

## 2020-01-17 ENCOUNTER — HOME CARE VISIT (OUTPATIENT)
Dept: SCHEDULING | Facility: HOME HEALTH | Age: 65
End: 2020-01-17
Payer: MEDICARE

## 2020-01-17 PROCEDURE — 3331090002 HH PPS REVENUE DEBIT

## 2020-01-17 PROCEDURE — G0151 HHCP-SERV OF PT,EA 15 MIN: HCPCS

## 2020-01-17 PROCEDURE — 3331090001 HH PPS REVENUE CREDIT

## 2020-01-17 PROCEDURE — 400013 HH SOC

## 2020-01-18 PROCEDURE — 3331090002 HH PPS REVENUE DEBIT

## 2020-01-18 PROCEDURE — 3331090001 HH PPS REVENUE CREDIT

## 2020-01-19 PROCEDURE — 3331090002 HH PPS REVENUE DEBIT

## 2020-01-19 PROCEDURE — 3331090001 HH PPS REVENUE CREDIT

## 2020-01-20 PROCEDURE — 3331090001 HH PPS REVENUE CREDIT

## 2020-01-20 PROCEDURE — 3331090002 HH PPS REVENUE DEBIT

## 2020-01-21 ENCOUNTER — HOME CARE VISIT (OUTPATIENT)
Dept: SCHEDULING | Facility: HOME HEALTH | Age: 65
End: 2020-01-21
Payer: MEDICARE

## 2020-01-21 PROCEDURE — 3331090001 HH PPS REVENUE CREDIT

## 2020-01-21 PROCEDURE — G0151 HHCP-SERV OF PT,EA 15 MIN: HCPCS

## 2020-01-21 PROCEDURE — 3331090002 HH PPS REVENUE DEBIT

## 2020-01-22 ENCOUNTER — HOME CARE VISIT (OUTPATIENT)
Dept: SCHEDULING | Facility: HOME HEALTH | Age: 65
End: 2020-01-22
Payer: MEDICARE

## 2020-01-22 PROCEDURE — G0151 HHCP-SERV OF PT,EA 15 MIN: HCPCS

## 2020-01-22 PROCEDURE — 3331090002 HH PPS REVENUE DEBIT

## 2020-01-22 PROCEDURE — 3331090001 HH PPS REVENUE CREDIT

## 2020-01-23 ENCOUNTER — HOME CARE VISIT (OUTPATIENT)
Dept: SCHEDULING | Facility: HOME HEALTH | Age: 65
End: 2020-01-23
Payer: MEDICARE

## 2020-01-23 VITALS — OXYGEN SATURATION: 96 % | RESPIRATION RATE: 18 BRPM

## 2020-01-23 PROCEDURE — 3331090002 HH PPS REVENUE DEBIT

## 2020-01-23 PROCEDURE — G0151 HHCP-SERV OF PT,EA 15 MIN: HCPCS

## 2020-01-23 PROCEDURE — 3331090001 HH PPS REVENUE CREDIT

## 2020-01-24 PROCEDURE — 3331090001 HH PPS REVENUE CREDIT

## 2020-01-24 PROCEDURE — 3331090002 HH PPS REVENUE DEBIT

## 2020-01-25 PROCEDURE — 3331090001 HH PPS REVENUE CREDIT

## 2020-01-25 PROCEDURE — 3331090002 HH PPS REVENUE DEBIT

## 2020-01-26 PROCEDURE — 3331090002 HH PPS REVENUE DEBIT

## 2020-01-26 PROCEDURE — 3331090001 HH PPS REVENUE CREDIT

## 2020-01-27 PROCEDURE — 3331090002 HH PPS REVENUE DEBIT

## 2020-01-27 PROCEDURE — 3331090001 HH PPS REVENUE CREDIT

## 2020-01-28 ENCOUNTER — HOME CARE VISIT (OUTPATIENT)
Dept: SCHEDULING | Facility: HOME HEALTH | Age: 65
End: 2020-01-28
Payer: MEDICARE

## 2020-01-28 VITALS
TEMPERATURE: 98.2 F | DIASTOLIC BLOOD PRESSURE: 70 MMHG | SYSTOLIC BLOOD PRESSURE: 130 MMHG | RESPIRATION RATE: 16 BRPM | OXYGEN SATURATION: 98 % | HEART RATE: 86 BPM

## 2020-01-28 PROCEDURE — G0151 HHCP-SERV OF PT,EA 15 MIN: HCPCS

## 2020-01-28 PROCEDURE — 3331090001 HH PPS REVENUE CREDIT

## 2020-01-28 PROCEDURE — 3331090002 HH PPS REVENUE DEBIT

## 2020-01-29 LAB
BACTERIA SPEC CULT: NORMAL
BACTERIA SPEC CULT: NORMAL
GRAM STN SPEC: NORMAL
SERVICE CMNT-IMP: NORMAL
SERVICE CMNT-IMP: NORMAL

## 2020-01-29 PROCEDURE — 3331090001 HH PPS REVENUE CREDIT

## 2020-01-29 PROCEDURE — 3331090002 HH PPS REVENUE DEBIT

## 2020-01-30 ENCOUNTER — HOME CARE VISIT (OUTPATIENT)
Dept: SCHEDULING | Facility: HOME HEALTH | Age: 65
End: 2020-01-30
Payer: MEDICARE

## 2020-01-30 VITALS
TEMPERATURE: 97.8 F | HEART RATE: 66 BPM | DIASTOLIC BLOOD PRESSURE: 68 MMHG | RESPIRATION RATE: 16 BRPM | OXYGEN SATURATION: 97 % | SYSTOLIC BLOOD PRESSURE: 134 MMHG

## 2020-01-30 PROCEDURE — G0151 HHCP-SERV OF PT,EA 15 MIN: HCPCS

## 2020-01-30 PROCEDURE — 3331090001 HH PPS REVENUE CREDIT

## 2020-01-30 PROCEDURE — 3331090002 HH PPS REVENUE DEBIT

## 2020-01-31 PROCEDURE — 3331090002 HH PPS REVENUE DEBIT

## 2020-01-31 PROCEDURE — 3331090001 HH PPS REVENUE CREDIT

## 2020-02-01 PROCEDURE — 3331090002 HH PPS REVENUE DEBIT

## 2020-02-01 PROCEDURE — 3331090001 HH PPS REVENUE CREDIT

## 2020-02-02 PROCEDURE — 3331090002 HH PPS REVENUE DEBIT

## 2020-02-02 PROCEDURE — 3331090001 HH PPS REVENUE CREDIT

## 2020-02-03 PROCEDURE — 3331090001 HH PPS REVENUE CREDIT

## 2020-02-03 PROCEDURE — 3331090002 HH PPS REVENUE DEBIT

## 2020-02-04 ENCOUNTER — HOME CARE VISIT (OUTPATIENT)
Dept: SCHEDULING | Facility: HOME HEALTH | Age: 65
End: 2020-02-04
Payer: MEDICARE

## 2020-02-04 VITALS
RESPIRATION RATE: 16 BRPM | HEART RATE: 66 BPM | TEMPERATURE: 98.1 F | SYSTOLIC BLOOD PRESSURE: 132 MMHG | DIASTOLIC BLOOD PRESSURE: 66 MMHG | OXYGEN SATURATION: 97 %

## 2020-02-04 PROCEDURE — 3331090002 HH PPS REVENUE DEBIT

## 2020-02-04 PROCEDURE — 3331090001 HH PPS REVENUE CREDIT

## 2020-02-04 PROCEDURE — G0151 HHCP-SERV OF PT,EA 15 MIN: HCPCS

## 2020-02-05 PROCEDURE — 3331090001 HH PPS REVENUE CREDIT

## 2020-02-05 PROCEDURE — 3331090002 HH PPS REVENUE DEBIT

## 2020-02-06 ENCOUNTER — HOME CARE VISIT (OUTPATIENT)
Dept: SCHEDULING | Facility: HOME HEALTH | Age: 65
End: 2020-02-06
Payer: MEDICARE

## 2020-02-06 PROCEDURE — 3331090001 HH PPS REVENUE CREDIT

## 2020-02-06 PROCEDURE — 3331090002 HH PPS REVENUE DEBIT

## 2020-02-06 PROCEDURE — G0151 HHCP-SERV OF PT,EA 15 MIN: HCPCS

## 2022-03-19 PROBLEM — M17.12 OSTEOARTHRITIS OF LEFT KNEE: Status: ACTIVE | Noted: 2020-01-15

## 2023-11-10 ENCOUNTER — OFFICE VISIT (OUTPATIENT)
Age: 68
End: 2023-11-10
Payer: MEDICARE

## 2023-11-10 VITALS
BODY MASS INDEX: 37.82 KG/M2 | DIASTOLIC BLOOD PRESSURE: 88 MMHG | WEIGHT: 227 LBS | HEIGHT: 65 IN | HEART RATE: 77 BPM | OXYGEN SATURATION: 99 % | SYSTOLIC BLOOD PRESSURE: 150 MMHG

## 2023-11-10 DIAGNOSIS — M17.12 OSTEOARTHRITIS OF LEFT KNEE: ICD-10-CM

## 2023-11-10 DIAGNOSIS — E78.5 DYSLIPIDEMIA: ICD-10-CM

## 2023-11-10 DIAGNOSIS — I10 HYPERTENSION, UNSPECIFIED TYPE: Primary | ICD-10-CM

## 2023-11-10 DIAGNOSIS — E78.00 HYPERCHOLESTEREMIA: Primary | ICD-10-CM

## 2023-11-10 DIAGNOSIS — E66.9 OBESITY (BMI 30-39.9): ICD-10-CM

## 2023-11-10 DIAGNOSIS — I10 HYPERTENSION, UNSPECIFIED TYPE: ICD-10-CM

## 2023-11-10 DIAGNOSIS — M79.10 MUSCLE PAIN: Primary | ICD-10-CM

## 2023-11-10 PROCEDURE — 1090F PRES/ABSN URINE INCON ASSESS: CPT | Performed by: SPECIALIST

## 2023-11-10 PROCEDURE — 1036F TOBACCO NON-USER: CPT | Performed by: SPECIALIST

## 2023-11-10 PROCEDURE — 1123F ACP DISCUSS/DSCN MKR DOCD: CPT | Performed by: SPECIALIST

## 2023-11-10 PROCEDURE — G8417 CALC BMI ABV UP PARAM F/U: HCPCS | Performed by: SPECIALIST

## 2023-11-10 PROCEDURE — 93005 ELECTROCARDIOGRAM TRACING: CPT | Performed by: SPECIALIST

## 2023-11-10 PROCEDURE — 99204 OFFICE O/P NEW MOD 45 MIN: CPT | Performed by: SPECIALIST

## 2023-11-10 PROCEDURE — 93010 ELECTROCARDIOGRAM REPORT: CPT | Performed by: SPECIALIST

## 2023-11-10 PROCEDURE — G8427 DOCREV CUR MEDS BY ELIG CLIN: HCPCS | Performed by: SPECIALIST

## 2023-11-10 PROCEDURE — 3077F SYST BP >= 140 MM HG: CPT | Performed by: SPECIALIST

## 2023-11-10 PROCEDURE — G8400 PT W/DXA NO RESULTS DOC: HCPCS | Performed by: SPECIALIST

## 2023-11-10 PROCEDURE — G8484 FLU IMMUNIZE NO ADMIN: HCPCS | Performed by: SPECIALIST

## 2023-11-10 PROCEDURE — 3017F COLORECTAL CA SCREEN DOC REV: CPT | Performed by: SPECIALIST

## 2023-11-10 PROCEDURE — 3079F DIAST BP 80-89 MM HG: CPT | Performed by: SPECIALIST

## 2023-11-10 RX ORDER — METHOTREXATE 2.5 MG/1
TABLET ORAL WEEKLY
COMMUNITY

## 2023-11-10 RX ORDER — AMLODIPINE BESYLATE 5 MG/1
5 TABLET ORAL DAILY
COMMUNITY

## 2023-11-10 RX ORDER — VITAMIN B COMPLEX
1 CAPSULE ORAL DAILY
COMMUNITY

## 2023-11-10 RX ORDER — ROSUVASTATIN CALCIUM 20 MG/1
20 TABLET, COATED ORAL DAILY
Qty: 90 TABLET | Refills: 3 | Status: SHIPPED | OUTPATIENT
Start: 2023-11-10

## 2023-11-10 RX ORDER — ROSUVASTATIN CALCIUM 20 MG/1
20 TABLET, COATED ORAL DAILY
COMMUNITY
End: 2023-11-10 | Stop reason: SDUPTHER

## 2023-11-10 RX ORDER — MAGNESIUM GLYCINATE 100 MG
CAPSULE ORAL
COMMUNITY

## 2023-11-10 RX ORDER — ASCORBIC ACID 1000 MG
TABLET ORAL
COMMUNITY

## 2023-11-10 RX ORDER — FOLIC ACID 5 MG
CAPSULE ORAL
COMMUNITY

## 2023-11-10 RX ORDER — CHOLECALCIFEROL (VITAMIN D3) 1250 MCG
CAPSULE ORAL
COMMUNITY

## 2023-11-10 NOTE — PATIENT INSTRUCTIONS

## 2023-12-07 DIAGNOSIS — R93.1 ELEVATED CORONARY ARTERY CALCIUM SCORE: ICD-10-CM

## 2023-12-07 DIAGNOSIS — E66.9 OBESITY (BMI 30-39.9): ICD-10-CM

## 2023-12-07 DIAGNOSIS — R06.02 SHORTNESS OF BREATH: Primary | ICD-10-CM

## 2023-12-07 DIAGNOSIS — I10 HYPERTENSION, UNSPECIFIED TYPE: ICD-10-CM

## 2023-12-07 DIAGNOSIS — E78.5 DYSLIPIDEMIA: ICD-10-CM

## 2023-12-19 ENCOUNTER — ANCILLARY PROCEDURE (OUTPATIENT)
Age: 68
End: 2023-12-19
Payer: MEDICARE

## 2023-12-19 VITALS
WEIGHT: 227 LBS | SYSTOLIC BLOOD PRESSURE: 146 MMHG | BODY MASS INDEX: 37.82 KG/M2 | DIASTOLIC BLOOD PRESSURE: 80 MMHG | HEART RATE: 80 BPM | HEIGHT: 65 IN

## 2023-12-19 VITALS — BODY MASS INDEX: 37.82 KG/M2 | HEIGHT: 65 IN | WEIGHT: 227 LBS

## 2023-12-19 DIAGNOSIS — E78.5 DYSLIPIDEMIA: ICD-10-CM

## 2023-12-19 DIAGNOSIS — I10 HYPERTENSION, UNSPECIFIED TYPE: ICD-10-CM

## 2023-12-19 DIAGNOSIS — R06.02 SHORTNESS OF BREATH: ICD-10-CM

## 2023-12-19 DIAGNOSIS — R93.1 ELEVATED CORONARY ARTERY CALCIUM SCORE: ICD-10-CM

## 2023-12-19 DIAGNOSIS — E66.9 OBESITY (BMI 30-39.9): ICD-10-CM

## 2023-12-19 LAB
ECHO AO ASC DIAM: 3.7 CM
ECHO AO ASCENDING AORTA INDEX: 1.77 CM/M2
ECHO AO ROOT DIAM: 3.4 CM
ECHO AO ROOT INDEX: 1.63 CM/M2
ECHO AV MEAN GRADIENT: 26 MMHG
ECHO AV MEAN VELOCITY: 2.4 M/S
ECHO AV PEAK GRADIENT: 47 MMHG
ECHO AV PEAK VELOCITY: 3.4 M/S
ECHO AV VTI: 75.7 CM
ECHO BSA: 2.17 M2
ECHO EST RA PRESSURE: 3 MMHG
ECHO LA VOL A-L A2C: 102 ML (ref 22–52)
ECHO LA VOL A-L A4C: 123 ML (ref 22–52)
ECHO LA VOL MOD A2C: 99 ML (ref 22–52)
ECHO LA VOL MOD A4C: 115 ML (ref 22–52)
ECHO LA VOLUME AREA LENGTH: 114 ML
ECHO LA VOLUME INDEX A-L A2C: 49 ML/M2 (ref 16–34)
ECHO LA VOLUME INDEX A-L A4C: 59 ML/M2 (ref 16–34)
ECHO LA VOLUME INDEX AREA LENGTH: 55 ML/M2 (ref 16–34)
ECHO LA VOLUME INDEX MOD A2C: 47 ML/M2 (ref 16–34)
ECHO LA VOLUME INDEX MOD A4C: 55 ML/M2 (ref 16–34)
ECHO LV IVSD: 1.3 CM (ref 0.6–0.9)
ECHO MV A VELOCITY: 1.5 M/S
ECHO MV AREA PHT: 2.7 CM2
ECHO MV E DECELERATION TIME (DT): 282.7 MS
ECHO MV E VELOCITY: 1.48 M/S
ECHO MV E/A RATIO: 0.99
ECHO MV MAX VELOCITY: 1.7 M/S
ECHO MV MEAN GRADIENT: 7 MMHG
ECHO MV MEAN VELOCITY: 1.3 M/S
ECHO MV PEAK GRADIENT: 11 MMHG
ECHO MV PRESSURE HALF TIME (PHT): 82 MS
ECHO MV VTI: 42.9 CM
ECHO RA AREA 4C: 17.7 CM2
ECHO RA END SYSTOLIC VOLUME APICAL 4 CHAMBER INDEX BSA: 22 ML/M2
ECHO RA VOLUME: 47 ML
ECHO RIGHT VENTRICULAR SYSTOLIC PRESSURE (RVSP): 30 MMHG
ECHO RV INTERNAL DIMENSION: 3.9 CM
ECHO TV REGURGITANT MAX VELOCITY: 2.62 M/S
ECHO TV REGURGITANT PEAK GRADIENT: 27 MMHG

## 2023-12-19 PROCEDURE — 93306 TTE W/DOPPLER COMPLETE: CPT | Performed by: SPECIALIST

## 2023-12-19 PROCEDURE — 78452 HT MUSCLE IMAGE SPECT MULT: CPT | Performed by: STUDENT IN AN ORGANIZED HEALTH CARE EDUCATION/TRAINING PROGRAM

## 2023-12-19 PROCEDURE — A9500 TC99M SESTAMIBI: HCPCS | Performed by: STUDENT IN AN ORGANIZED HEALTH CARE EDUCATION/TRAINING PROGRAM

## 2023-12-19 PROCEDURE — PBSHW PBB SHADOW CHARGE: Performed by: SPECIALIST

## 2023-12-19 RX ORDER — TETRAKIS(2-METHOXYISOBUTYLISOCYANIDE)COPPER(I) TETRAFLUOROBORATE 1 MG/ML
26.1 INJECTION, POWDER, LYOPHILIZED, FOR SOLUTION INTRAVENOUS
Status: COMPLETED | OUTPATIENT
Start: 2023-12-19 | End: 2023-12-19

## 2023-12-19 RX ORDER — REGADENOSON 0.08 MG/ML
0.4 INJECTION, SOLUTION INTRAVENOUS
Status: COMPLETED | OUTPATIENT
Start: 2023-12-19 | End: 2023-12-19

## 2023-12-19 RX ADMIN — TECHNETIUM TC-99M SESTAMIBI 26.1 MILLICURIE: 1 INJECTION INTRAVENOUS at 12:49

## 2023-12-19 RX ADMIN — REGADENOSON 0.4 MG: 0.08 INJECTION, SOLUTION INTRAVENOUS at 12:54

## 2023-12-20 LAB
ECHO BSA: 2.17 M2
NUC STRESS EJECTION FRACTION: 63 %
STRESS BASELINE DIAS BP: 78 MMHG
STRESS BASELINE HR: 81 BPM
STRESS BASELINE ST DEPRESSION: 0 MM
STRESS BASELINE SYS BP: 146 MMHG
STRESS ESTIMATED WORKLOAD: 0 METS
STRESS EXERCISE DUR MIN: 0 MIN
STRESS EXERCISE DUR SEC: 0 SEC
STRESS O2 SAT PEAK: 98 %
STRESS O2 SAT REST: 98 %
STRESS PEAK DIAS BP: 64 MMHG
STRESS PEAK SYS BP: 108 MMHG
STRESS PERCENT HR ACHIEVED: 63 %
STRESS POST PEAK HR: 96 BPM
STRESS RATE PRESSURE PRODUCT: NORMAL BPM*MMHG
STRESS TARGET HR: 152 BPM
TID: 1.06

## 2023-12-20 PROCEDURE — A9500 TC99M SESTAMIBI: HCPCS | Performed by: STUDENT IN AN ORGANIZED HEALTH CARE EDUCATION/TRAINING PROGRAM

## 2023-12-20 PROCEDURE — 93016 CV STRESS TEST SUPVJ ONLY: CPT | Performed by: STUDENT IN AN ORGANIZED HEALTH CARE EDUCATION/TRAINING PROGRAM

## 2023-12-20 PROCEDURE — 93018 CV STRESS TEST I&R ONLY: CPT | Performed by: STUDENT IN AN ORGANIZED HEALTH CARE EDUCATION/TRAINING PROGRAM

## 2023-12-20 PROCEDURE — 78452 HT MUSCLE IMAGE SPECT MULT: CPT | Performed by: STUDENT IN AN ORGANIZED HEALTH CARE EDUCATION/TRAINING PROGRAM

## 2023-12-20 PROCEDURE — PBSHW PBB SHADOW CHARGE: Performed by: STUDENT IN AN ORGANIZED HEALTH CARE EDUCATION/TRAINING PROGRAM

## 2023-12-20 RX ORDER — TETRAKIS(2-METHOXYISOBUTYLISOCYANIDE)COPPER(I) TETRAFLUOROBORATE 1 MG/ML
26 INJECTION, POWDER, LYOPHILIZED, FOR SOLUTION INTRAVENOUS
Status: COMPLETED | OUTPATIENT
Start: 2023-12-20 | End: 2023-12-20

## 2023-12-20 RX ADMIN — TECHNETIUM TC-99M SESTAMIBI 26 MILLICURIE: 1 INJECTION INTRAVENOUS at 08:05

## 2024-01-02 ENCOUNTER — TELEPHONE (OUTPATIENT)
Age: 69
End: 2024-01-02

## 2024-01-02 DIAGNOSIS — R94.39 ABNORMAL STRESS TEST: Primary | ICD-10-CM

## 2024-01-02 PROBLEM — I35.0 AORTIC STENOSIS: Status: ACTIVE | Noted: 2024-01-02

## 2024-01-02 NOTE — TELEPHONE ENCOUNTER
Spoke with Pt of [facility] schd. For 1/18/2024 At 1:00pm arrive at 11:30am Pt aware that they need a  NPO from Midnight the night before. Check in at the second floor Outpt. Reg. Desk. Pt is to have Labs done between 1/2/24-1/11/24.   Medications:  Ok to take   VO by /Dr. Randall/nurse Tiana RAY

## 2024-01-04 DIAGNOSIS — Z01.818 PREOP TESTING: ICD-10-CM

## 2024-01-04 DIAGNOSIS — R06.02 SHORTNESS OF BREATH: ICD-10-CM

## 2024-01-04 DIAGNOSIS — I10 HYPERTENSION, UNSPECIFIED TYPE: ICD-10-CM

## 2024-01-04 DIAGNOSIS — R93.1 ELEVATED CORONARY ARTERY CALCIUM SCORE: ICD-10-CM

## 2024-01-05 LAB
ANION GAP SERPL CALC-SCNC: 9 MMOL/L (ref 5–15)
BUN SERPL-MCNC: 23 MG/DL (ref 6–20)
BUN/CREAT SERPL: 35 (ref 12–20)
CALCIUM SERPL-MCNC: 9 MG/DL (ref 8.5–10.1)
CHLORIDE SERPL-SCNC: 109 MMOL/L (ref 97–108)
CO2 SERPL-SCNC: 26 MMOL/L (ref 21–32)
CREAT SERPL-MCNC: 0.66 MG/DL (ref 0.55–1.02)
ERYTHROCYTE [DISTWIDTH] IN BLOOD BY AUTOMATED COUNT: 12.8 % (ref 11.5–14.5)
GLUCOSE SERPL-MCNC: 93 MG/DL (ref 65–100)
HCT VFR BLD AUTO: 42.9 % (ref 35–47)
HGB BLD-MCNC: 14.5 G/DL (ref 11.5–16)
MCH RBC QN AUTO: 33.9 PG (ref 26–34)
MCHC RBC AUTO-ENTMCNC: 33.8 G/DL (ref 30–36.5)
MCV RBC AUTO: 100.2 FL (ref 80–99)
NRBC # BLD: 0 K/UL (ref 0–0.01)
NRBC BLD-RTO: 0 PER 100 WBC
PLATELET # BLD AUTO: 114 K/UL (ref 150–400)
PMV BLD AUTO: 11.2 FL (ref 8.9–12.9)
POTASSIUM SERPL-SCNC: 4.8 MMOL/L (ref 3.5–5.1)
RBC # BLD AUTO: 4.28 M/UL (ref 3.8–5.2)
SODIUM SERPL-SCNC: 144 MMOL/L (ref 136–145)
WBC # BLD AUTO: 4.2 K/UL (ref 3.6–11)

## 2024-01-11 ENCOUNTER — DIRECT ADMIT ORDERS (OUTPATIENT)
Facility: HOSPITAL | Age: 69
End: 2024-01-11

## 2024-01-11 DIAGNOSIS — I35.0 AORTIC STENOSIS: ICD-10-CM

## 2024-01-11 DIAGNOSIS — R94.39 ABNORMAL STRESS TEST: Primary | ICD-10-CM

## 2024-01-11 RX ORDER — SODIUM CHLORIDE 0.9 % (FLUSH) 0.9 %
5-40 SYRINGE (ML) INJECTION EVERY 12 HOURS SCHEDULED
OUTPATIENT
Start: 2024-01-18

## 2024-01-11 RX ORDER — SODIUM CHLORIDE 9 MG/ML
INJECTION, SOLUTION INTRAVENOUS PRN
OUTPATIENT
Start: 2024-01-18

## 2024-01-11 RX ORDER — SODIUM CHLORIDE 0.9 % (FLUSH) 0.9 %
5-40 SYRINGE (ML) INJECTION PRN
OUTPATIENT
Start: 2024-01-18

## 2024-01-11 RX ORDER — SODIUM CHLORIDE 9 MG/ML
INJECTION, SOLUTION INTRAVENOUS CONTINUOUS
OUTPATIENT
Start: 2024-01-18

## 2024-01-18 ENCOUNTER — HOSPITAL ENCOUNTER (OUTPATIENT)
Facility: HOSPITAL | Age: 69
Setting detail: OUTPATIENT SURGERY
Discharge: HOME | End: 2024-01-18
Attending: INTERNAL MEDICINE | Admitting: INTERNAL MEDICINE
Payer: MEDICARE

## 2024-01-18 VITALS
DIASTOLIC BLOOD PRESSURE: 86 MMHG | OXYGEN SATURATION: 93 % | SYSTOLIC BLOOD PRESSURE: 149 MMHG | RESPIRATION RATE: 12 BRPM | BODY MASS INDEX: 37.84 KG/M2 | HEIGHT: 65 IN | HEART RATE: 89 BPM | TEMPERATURE: 98.7 F | WEIGHT: 227.1 LBS

## 2024-01-18 DIAGNOSIS — R94.39 ABNORMAL STRESS TEST: ICD-10-CM

## 2024-01-18 DIAGNOSIS — I35.0 AORTIC STENOSIS: ICD-10-CM

## 2024-01-18 LAB
BASOPHILS # BLD: 0 K/UL (ref 0–0.1)
BASOPHILS NFR BLD: 1 % (ref 0–1)
DIFFERENTIAL METHOD BLD: ABNORMAL
ECHO BSA: 2.17 M2
EOSINOPHIL # BLD: 0.1 K/UL (ref 0–0.4)
EOSINOPHIL NFR BLD: 1 % (ref 0–7)
ERYTHROCYTE [DISTWIDTH] IN BLOOD BY AUTOMATED COUNT: 12.9 % (ref 11.5–14.5)
HCT VFR BLD AUTO: 41.6 % (ref 35–47)
HGB BLD-MCNC: 14.4 G/DL (ref 11.5–16)
IMM GRANULOCYTES # BLD AUTO: 0 K/UL (ref 0–0.04)
IMM GRANULOCYTES NFR BLD AUTO: 0 % (ref 0–0.5)
LYMPHOCYTES # BLD: 1.4 K/UL (ref 0.8–3.5)
LYMPHOCYTES NFR BLD: 32 % (ref 12–49)
MCH RBC QN AUTO: 33.5 PG (ref 26–34)
MCHC RBC AUTO-ENTMCNC: 34.6 G/DL (ref 30–36.5)
MCV RBC AUTO: 96.7 FL (ref 80–99)
MONOCYTES # BLD: 0.5 K/UL (ref 0–1)
MONOCYTES NFR BLD: 12 % (ref 5–13)
NEUTS SEG # BLD: 2.3 K/UL (ref 1.8–8)
NEUTS SEG NFR BLD: 54 % (ref 32–75)
NRBC # BLD: 0 K/UL (ref 0–0.01)
NRBC BLD-RTO: 0 PER 100 WBC
PLATELET # BLD AUTO: 128 K/UL (ref 150–400)
PMV BLD AUTO: 10 FL (ref 8.9–12.9)
RBC # BLD AUTO: 4.3 M/UL (ref 3.8–5.2)
WBC # BLD AUTO: 4.3 K/UL (ref 3.6–11)

## 2024-01-18 PROCEDURE — 2709999900 HC NON-CHARGEABLE SUPPLY: Performed by: INTERNAL MEDICINE

## 2024-01-18 PROCEDURE — C1894 INTRO/SHEATH, NON-LASER: HCPCS | Performed by: INTERNAL MEDICINE

## 2024-01-18 PROCEDURE — 2500000003 HC RX 250 WO HCPCS: Performed by: INTERNAL MEDICINE

## 2024-01-18 PROCEDURE — 36415 COLL VENOUS BLD VENIPUNCTURE: CPT

## 2024-01-18 PROCEDURE — 93458 L HRT ARTERY/VENTRICLE ANGIO: CPT | Performed by: INTERNAL MEDICINE

## 2024-01-18 PROCEDURE — 6360000004 HC RX CONTRAST MEDICATION: Performed by: INTERNAL MEDICINE

## 2024-01-18 PROCEDURE — 6360000002 HC RX W HCPCS: Performed by: INTERNAL MEDICINE

## 2024-01-18 PROCEDURE — C1769 GUIDE WIRE: HCPCS | Performed by: INTERNAL MEDICINE

## 2024-01-18 PROCEDURE — 85025 COMPLETE CBC W/AUTO DIFF WBC: CPT

## 2024-01-18 PROCEDURE — 99152 MOD SED SAME PHYS/QHP 5/>YRS: CPT | Performed by: INTERNAL MEDICINE

## 2024-01-18 RX ORDER — LIDOCAINE HYDROCHLORIDE 10 MG/ML
INJECTION, SOLUTION INFILTRATION; PERINEURAL PRN
Status: DISCONTINUED | OUTPATIENT
Start: 2024-01-18 | End: 2024-01-18 | Stop reason: HOSPADM

## 2024-01-18 RX ORDER — SODIUM CHLORIDE 0.9 % (FLUSH) 0.9 %
5-40 SYRINGE (ML) INJECTION PRN
Status: DISCONTINUED | OUTPATIENT
Start: 2024-01-18 | End: 2024-01-18 | Stop reason: HOSPADM

## 2024-01-18 RX ORDER — FENTANYL CITRATE 50 UG/ML
INJECTION, SOLUTION INTRAMUSCULAR; INTRAVENOUS PRN
Status: DISCONTINUED | OUTPATIENT
Start: 2024-01-18 | End: 2024-01-18 | Stop reason: HOSPADM

## 2024-01-18 RX ORDER — HEPARIN SODIUM 1000 [USP'U]/ML
INJECTION, SOLUTION INTRAVENOUS; SUBCUTANEOUS PRN
Status: DISCONTINUED | OUTPATIENT
Start: 2024-01-18 | End: 2024-01-18 | Stop reason: HOSPADM

## 2024-01-18 RX ORDER — HEPARIN SODIUM 200 [USP'U]/100ML
INJECTION, SOLUTION INTRAVENOUS PRN
Status: DISCONTINUED | OUTPATIENT
Start: 2024-01-18 | End: 2024-01-18 | Stop reason: HOSPADM

## 2024-01-18 RX ORDER — SODIUM CHLORIDE 9 MG/ML
INJECTION, SOLUTION INTRAVENOUS CONTINUOUS
Status: DISCONTINUED | OUTPATIENT
Start: 2024-01-18 | End: 2024-01-18 | Stop reason: HOSPADM

## 2024-01-18 RX ORDER — VERAPAMIL HYDROCHLORIDE 2.5 MG/ML
INJECTION, SOLUTION INTRAVENOUS PRN
Status: DISCONTINUED | OUTPATIENT
Start: 2024-01-18 | End: 2024-01-18 | Stop reason: HOSPADM

## 2024-01-18 RX ORDER — SODIUM CHLORIDE 9 MG/ML
INJECTION, SOLUTION INTRAVENOUS PRN
Status: DISCONTINUED | OUTPATIENT
Start: 2024-01-18 | End: 2024-01-18 | Stop reason: HOSPADM

## 2024-01-18 RX ORDER — SODIUM CHLORIDE 0.9 % (FLUSH) 0.9 %
5-40 SYRINGE (ML) INJECTION EVERY 12 HOURS SCHEDULED
Status: DISCONTINUED | OUTPATIENT
Start: 2024-01-18 | End: 2024-01-18 | Stop reason: HOSPADM

## 2024-01-18 RX ORDER — MIDAZOLAM HYDROCHLORIDE 1 MG/ML
INJECTION INTRAMUSCULAR; INTRAVENOUS PRN
Status: DISCONTINUED | OUTPATIENT
Start: 2024-01-18 | End: 2024-01-18 | Stop reason: HOSPADM

## 2024-01-18 NOTE — DISCHARGE INSTRUCTIONS
Transradial Cardiac Catheterization/Angiography Discharge Instructions    It is normal to feel tired the first couple days.  Take it easy and follow the physician's instructions.  Wear wrist splint for first 24 hours to keep the wrist stable. No repetitive flexing of wrist.       CHECK THE CATHETER INSERTION SITE DAILY:  Remove the wrist dressing 24 hours after the procedure.  You may shower 24 hours after the procedure.  Wash with soap and water and pat dry.  Gentle cleaning of the site with soap and water is sufficient, cover with a dry clean dressing or bandage.  Do not apply creams or powders to the area.  No soaking the wrist for 3 days.  Leave the puncture site open to air after 24 hours post-procedure.    CALL THE PHYSICIANS:   If you have signs of infection, such as:            - A fever  If the site becomes red, swollen or feels warm to the touch  If there is drainage or if there is unusual pain at the radial site.     MONITOR FOR BLEEDING:    If there is any minor oozing, you may apply a band-aid and remove after 12 hours.   If the bleeding continues or if there is a lot of bleeding hold pressure with the middle finger against the puncture site and the thumb against the back of the wrist,call 911 to be transported to the hospital.  DO NOT DRIVE YOURSELF, OR HAVE ANYONE ELSE DRIVE YOU - CALL 911.    ACTIVITY:   For the first 24 hours do not manipulate the wrist.  No lifting, pushing or pulling over 3-5 pounds with the affected wrist for 7 daysand no straining the insertion site. Do not life grocery bags or the garbage can, do not run the vacuum  or  for 7 days.  Start with short walks as in the hospital and gradually increase as tolerated each day.  It is recommended to walk 30 minutes 5-7 days per week.  Follow your physician's instructions on activity.  Avoid walking outside in extremes of heat or cold.  Walk inside when it is cold and windy or hot and humid.     Things to keep in

## 2024-01-18 NOTE — PROCEDURES
BRIEF PROCEDURE NOTE    Date of Procedure: 1/18/2024   Preoperative Diagnosis: Abnormal stress test  Postoperative Diagnosis:  No sig CAD  Procedure: Left heart cath, LV angiography, coronary angiography  Interventional Cardiologist: Willian Hogue MD  Assistant : none  Anesthesia: local + IV sedation  I administered moderate sedation throughout this procedure. An independent trained observer pushed medications at my direction, and monitored the patient’s level of consciousness and physiological status throughout.  Estimated Blood Loss: Minimal    Access: R Radial Access - 6 F sheath       Catheters: RCA :JR4                    LCA : JL4    Findings:     L Main:  Large; MLI    LAD: Large; MLI; Med/distal - small; MLI; D1 - small to med; MLI    LCflex: Prox/Mid- Med to large; MLI; Distal - very small; Very small OM1; OM2 - Med to large; MLI;     RCA: Dominant; Large; MLI; PDA andPLB - med ; MLI    LVEDP: 8 mm Hg    LVEF: Not assessed    Mod AS    PCI: none      Specimens Removed : None    Devices implanted : None    Complications: None    Closure Device: R Radial - TR band        See full cath note.    Complications: none    Willian Hogue MD

## 2024-01-18 NOTE — H&P
Pt personally seen and examined. Chart reviewed. H and P as per office note 11/12/23 - see below        Neck-no JVD  CVS-S1-S2 present, no murmur     RS-   CTAB     Abdomen-  soft/NT  LE-   no edema    A/P :    Abnormal stress test - C    Discussed with patient/nursing    Willian Hogue. MD, FACC      11/10/23  Primary care: Thao Dobbins MD        ATTENTION:   This medical record was transcribed using an electronic medical records/speech recognition system.  Although proofread, it may and can contain electronic, spelling and other errors.  Corrections may be executed at a later time.  Please feel free to contact us for any clarifications as needed.                  Blanche Mars is a 68 y.o. female with  referred for hypertension and dyslipidemia as well as elevated calcium score.       Cardiac risk factors: advanced age (older than 55 for men, 65 for women), dyslipidemia, hypertension, and obesity (BMI >= 30 kg/m2)        HISTORY OF PRESENTING ILLNESS    Ms./Mr. Blanche Mars  68 y.o. is very nice lady who is very smart and capable.  She does see me for hypertension and she had elevated calcium score.  It was described as being in the 400s.  She said she had some shortness of breath and that was after the COVID-vaccine.  She was walking 1 mile a day at her [employer] but does note some shortness of breath and also since she did get the COVID-vaccine.  Has a history of hypertension and dyslipidemia.  She states she has rheumatoid arthritis is on methotrexate and has lymphedema as well.  She will do chair aerobics and tries to remain active and is working on weight loss.            ACTIVE PROBLEM LIST           Patient Active Problem List     Diagnosis Date Noted    Osteoarthritis of left knee 01/15/2020             PAST MEDICAL HISTORY      Past Medical History        Past Medical History:   Diagnosis Date    Anxiety      Benign heart murmur      Hypertension      Lymphedema       bilateral LE    RA

## 2024-01-18 NOTE — PROGRESS NOTES
11:46 AM  Patient brought back from waiting area. ID band and allergies confirmed. Consents signed.     12:54 PM  TRANSFER - OUT REPORT:    Verbal report given to Jim on Blanche Mars  being transferred to Cath Lab for ordered procedure       Report consisted of patient's Situation, Background, Assessment and   Recommendations(SBAR).     Information from the following report(s) Nurse Handoff Report was reviewed with the receiving nurse.           Lines:   Peripheral IV Right Hand (Active)       Peripheral IV 01/18/24 Left;Posterior Hand (Active)        Opportunity for questions and clarification was provided.      Patient transported with:  Tech    11:37 AM  TRANSFER - IN REPORT:    Verbal report received from EDISON Waggoner on Blanche Mars  being received from Cath Lab for routine post-op      Report consisted of patient's Situation, Background, Assessment and   Recommendations(SBAR).     Information from the following report(s) Nurse Handoff Report was reviewed with the receiving nurse.    Opportunity for questions and clarification was provided.      Assessment completed upon patient's arrival to unit and care assumed.     2:40 PM  2 ml air released from TR Band. No bleeding or hematoma noted. Radial and Ulnar pulse on 13 wrist palpable. Pt tolerated well. Will continue to monitor.     2:45 PM  3 ml air released from TR Band. No bleeding or hematoma noted. Radial and Ulnar pulse on 10 wrist palpable. Pt tolerated well. Will continue to monitor.     2:50 PM  3 ml air released from TR Band. No bleeding or hematoma noted. Radial and Ulnar pulse on 7 wrist palpable. Pt tolerated well. Will continue to monitor.     2:55 PM  3 ml air released from TR Band. No bleeding or hematoma noted. Radial and Ulnar pulse on 4 wrist palpable. Pt tolerated well. Will continue to monitor.     3:00 PM  4 ml air released from TR Band. No bleeding or hematoma noted. Radial and Ulnar pulse on 0 wrist palpable. Pt tolerated well. Will

## 2024-01-22 ENCOUNTER — OFFICE VISIT (OUTPATIENT)
Age: 69
End: 2024-01-22
Payer: MEDICARE

## 2024-01-22 VITALS
DIASTOLIC BLOOD PRESSURE: 92 MMHG | BODY MASS INDEX: 38.29 KG/M2 | HEART RATE: 83 BPM | HEIGHT: 65 IN | OXYGEN SATURATION: 96 % | WEIGHT: 229.8 LBS | SYSTOLIC BLOOD PRESSURE: 158 MMHG

## 2024-01-22 DIAGNOSIS — I10 HYPERTENSION, UNSPECIFIED TYPE: ICD-10-CM

## 2024-01-22 DIAGNOSIS — E78.5 DYSLIPIDEMIA: ICD-10-CM

## 2024-01-22 DIAGNOSIS — R93.1 ELEVATED CORONARY ARTERY CALCIUM SCORE: ICD-10-CM

## 2024-01-22 DIAGNOSIS — I35.0 NONRHEUMATIC AORTIC VALVE STENOSIS: Primary | ICD-10-CM

## 2024-01-22 DIAGNOSIS — R06.02 SHORTNESS OF BREATH: ICD-10-CM

## 2024-01-22 LAB — ECHO BSA: 2.17 M2

## 2024-01-22 PROCEDURE — 1123F ACP DISCUSS/DSCN MKR DOCD: CPT | Performed by: SPECIALIST

## 2024-01-22 PROCEDURE — G8427 DOCREV CUR MEDS BY ELIG CLIN: HCPCS | Performed by: SPECIALIST

## 2024-01-22 PROCEDURE — 1036F TOBACCO NON-USER: CPT | Performed by: SPECIALIST

## 2024-01-22 PROCEDURE — 3080F DIAST BP >= 90 MM HG: CPT | Performed by: SPECIALIST

## 2024-01-22 PROCEDURE — G8417 CALC BMI ABV UP PARAM F/U: HCPCS | Performed by: SPECIALIST

## 2024-01-22 PROCEDURE — 3017F COLORECTAL CA SCREEN DOC REV: CPT | Performed by: SPECIALIST

## 2024-01-22 PROCEDURE — G8484 FLU IMMUNIZE NO ADMIN: HCPCS | Performed by: SPECIALIST

## 2024-01-22 PROCEDURE — 99214 OFFICE O/P EST MOD 30 MIN: CPT | Performed by: SPECIALIST

## 2024-01-22 PROCEDURE — G8400 PT W/DXA NO RESULTS DOC: HCPCS | Performed by: SPECIALIST

## 2024-01-22 PROCEDURE — 1090F PRES/ABSN URINE INCON ASSESS: CPT | Performed by: SPECIALIST

## 2024-01-22 PROCEDURE — 3077F SYST BP >= 140 MM HG: CPT | Performed by: SPECIALIST

## 2024-01-22 NOTE — PROGRESS NOTES
CARDIOLOGY OFFICE NOTE    Bakari Randall MD, [facility]    [address], [place] [address]  Phone [phone]; Fax [phone]  Mobile [phone]   Voice Mail [phone]    Primary care: Thao Dobbins MD       ATTENTION:   This medical record was transcribed using an electronic medical records/speech recognition system.  Although proofread, it may and can contain electronic, spelling and other errors.  Corrections may be executed at a later time.  Please feel free to contact us for any clarifications as needed.             Blanche Mars is a 69 y.o. female with  referred for hypertension and dyslipidemia as well as elevated calcium score.       Cardiac risk factors: advanced age (older than 55 for men, 65 for women), dyslipidemia, hypertension, and obesity (BMI >= 30 kg/m2)      HISTORY OF PRESENTING ILLNESS   From previous note: Ms./Mr. Blanche Mars  69 y.o. is very nice lady who is very smart and capable.  She does see me for hypertension and she had elevated calcium score.  It was described as being in the 400s.  She said she had some shortness of breath and that was after the COVID-vaccine.  She was walking 1 mile a day at her [employer] but does note some shortness of breath and also since she did get the COVID-vaccine.  Has a history of hypertension and dyslipidemia.  She states she has rheumatoid arthritis is on methotrexate and has lymphedema as well.  She will do chair aerobics and tries to remain active and is working on weight loss.    She is doing well status post cath that demonstrated no significant CAD.  She was to get her cholesterol profile done 2 months after starting the Crestor but has not done that yet so I am providing her lab requisition to have that done.  LDL was significantly elevated.  Her cath site in the right radial artery is bruised but not painful.         ACTIVE PROBLEM LIST     Patient Active Problem List    Diagnosis Date Noted

## 2024-01-22 NOTE — PATIENT INSTRUCTIONS
I got my RSV vaccine yesterday it is  my pleasure to have the opportunity to be involved in taking care of you and to provide you the best cardiac care.    At the end of every visit I  encourage you to eat healthy and clean and reduce your red meat intake as well as exercise 30 minutes 5 days a week to ensure a healthy heart.  If you are a smoker , it will be essential that you stop smoking to reduce your risk of heart disease.      Part of providing you the best heart care possible IS AN ACCURATE KNOWLEDGE OF YOUR MEDICINE. It  will be  essential at each office visit that you provide me with an accurate list of your medicines.  When you come into the office you should have that list or another option is lining up your pill bottles  and taking a snapshot with your cell phone of all the medicines you are taking currently and show it to the nurse in the examining room.    Inaccurate reporting of your medication to the nurse may lead to adverse events and medical errors.      Thank you again for giving me the opportunity to be part of your heart care and it is my pleasure.    All the best,    Bakari Randall MD

## 2024-01-22 NOTE — PROGRESS NOTES
Chief Complaint   Patient presents with    Hypertension    Other     DYSLIPIDEMIA    Cholesterol Problem     Vitals:    24 0846   BP: (!) 158/92   Site: Left Upper Arm   Position: Sitting   Pulse: 83   SpO2: 96%   Weight: 104.2 kg (229 lb 12.8 oz)   Height: 1.651 m (5' 5\")     Chest pain: DENIED     Recent hospital stays: DENIED     Refills: DENIED     NAME Blanche Mars         1955      MRN    [id number]      LAST OFFICE APPOINTMENT: 11/10/2023     DIAGNOSIS    ICD-10-CM    1. Nonrheumatic aortic valve stenosis  I35.0       2. Shortness of breath  R06.02       3. Elevated coronary artery calcium score  R93.1       4. Hypertension, unspecified type  I10       5. Dyslipidemia  E78.5           HOME MEDICATION  Current Outpatient Medications   Medication Sig    amLODIPine (NORVASC) 5 MG tablet Take 1 tablet by mouth daily    Folic Acid 5 MG CAPS Take by mouth    b complex vitamins capsule Take 1 capsule by mouth daily    Coenzyme Q10 (CO Q 10) 10 MG CAPS Take by mouth    Cholecalciferol (VITAMIN D3) 1.25 MG (47575 UT) CAPS Take by mouth    Magnesium Glycinate 100 MG CAPS Take by mouth    rosuvastatin (CRESTOR) 20 MG tablet Take 1 tablet by mouth daily    methotrexate (RHEUMATREX) 2.5 MG chemo tablet Take by mouth once a week (Patient not taking: Reported on 2024)     No current facility-administered medications for this visit.       VITAL SIGNS  Wt Readings from Last 3 Encounters:   24 104.2 kg (229 lb 12.8 oz)   24 103 kg (227 lb 1.6 oz)   23 103 kg (227 lb)     BP Readings from Last 3 Encounters:   24 (!) 158/92   24 (!) 149/86   23 (!) 146/80     Pulse Readings from Last 3 Encounters:   24 83   24 89   23 80         SPECIALTY COMMENTS  1. Lipids  23- , HDL 65, , TG 60  10/30/23- , HDL 56, , TG 63    2. Calcium Score  23- Total score 561  LM 0      L Circ 90    3. Echo  23-EF 65 - 70%,

## 2024-01-23 DIAGNOSIS — I35.0 NONRHEUMATIC AORTIC VALVE STENOSIS: ICD-10-CM

## 2024-01-23 DIAGNOSIS — I10 HYPERTENSION, UNSPECIFIED TYPE: ICD-10-CM

## 2024-01-23 DIAGNOSIS — R06.02 SHORTNESS OF BREATH: ICD-10-CM

## 2024-01-23 DIAGNOSIS — R93.1 ELEVATED CORONARY ARTERY CALCIUM SCORE: ICD-10-CM

## 2024-01-23 DIAGNOSIS — E78.5 DYSLIPIDEMIA: ICD-10-CM

## 2024-01-23 LAB
ALBUMIN SERPL-MCNC: 3.9 G/DL (ref 3.5–5)
ALBUMIN/GLOB SERPL: 1.1 (ref 1.1–2.2)
ALP SERPL-CCNC: 61 U/L (ref 45–117)
ALT SERPL-CCNC: 25 U/L (ref 12–78)
AST SERPL-CCNC: 18 U/L (ref 15–37)
BILIRUB DIRECT SERPL-MCNC: 0.2 MG/DL (ref 0–0.2)
BILIRUB SERPL-MCNC: 0.8 MG/DL (ref 0.2–1)
CHOLEST SERPL-MCNC: 222 MG/DL
GLOBULIN SER CALC-MCNC: 3.6 G/DL (ref 2–4)
HDLC SERPL-MCNC: 60 MG/DL
HDLC SERPL: 3.7 (ref 0–5)
LDLC SERPL CALC-MCNC: 148.2 MG/DL (ref 0–100)
PROT SERPL-MCNC: 7.5 G/DL (ref 6.4–8.2)
TRIGL SERPL-MCNC: 69 MG/DL
VLDLC SERPL CALC-MCNC: 13.8 MG/DL

## 2024-02-12 NOTE — RESULT ENCOUNTER NOTE
Your potassium and kidney function  are normal and this is great news. Your blood counts are normal. Your MCV is slightly elevated and may be related to vit b12 deficiency or folate deficiency and I would discuss this with your primary care. I hope all is well.    All the best,    Bakari

## 2024-03-01 ENCOUNTER — OFFICE VISIT (OUTPATIENT)
Age: 69
End: 2024-03-01
Payer: MEDICARE

## 2024-03-01 VITALS
HEIGHT: 65 IN | BODY MASS INDEX: 38.32 KG/M2 | SYSTOLIC BLOOD PRESSURE: 130 MMHG | HEART RATE: 94 BPM | OXYGEN SATURATION: 97 % | WEIGHT: 230 LBS | DIASTOLIC BLOOD PRESSURE: 72 MMHG

## 2024-03-01 DIAGNOSIS — I10 PRIMARY HYPERTENSION: ICD-10-CM

## 2024-03-01 DIAGNOSIS — I35.0 NONRHEUMATIC AORTIC VALVE STENOSIS: Primary | ICD-10-CM

## 2024-03-01 DIAGNOSIS — I34.0 MITRAL VALVE INSUFFICIENCY, UNSPECIFIED ETIOLOGY: ICD-10-CM

## 2024-03-01 DIAGNOSIS — E78.2 MIXED HYPERLIPIDEMIA: ICD-10-CM

## 2024-03-01 PROCEDURE — 99204 OFFICE O/P NEW MOD 45 MIN: CPT | Performed by: INTERNAL MEDICINE

## 2024-03-01 PROCEDURE — 3075F SYST BP GE 130 - 139MM HG: CPT | Performed by: INTERNAL MEDICINE

## 2024-03-01 PROCEDURE — 1036F TOBACCO NON-USER: CPT | Performed by: INTERNAL MEDICINE

## 2024-03-01 PROCEDURE — 3078F DIAST BP <80 MM HG: CPT | Performed by: INTERNAL MEDICINE

## 2024-03-01 PROCEDURE — 3017F COLORECTAL CA SCREEN DOC REV: CPT | Performed by: INTERNAL MEDICINE

## 2024-03-01 PROCEDURE — 1090F PRES/ABSN URINE INCON ASSESS: CPT | Performed by: INTERNAL MEDICINE

## 2024-03-01 PROCEDURE — G8427 DOCREV CUR MEDS BY ELIG CLIN: HCPCS | Performed by: INTERNAL MEDICINE

## 2024-03-01 PROCEDURE — G8484 FLU IMMUNIZE NO ADMIN: HCPCS | Performed by: INTERNAL MEDICINE

## 2024-03-01 PROCEDURE — 1123F ACP DISCUSS/DSCN MKR DOCD: CPT | Performed by: INTERNAL MEDICINE

## 2024-03-01 PROCEDURE — G8417 CALC BMI ABV UP PARAM F/U: HCPCS | Performed by: INTERNAL MEDICINE

## 2024-03-01 PROCEDURE — G8400 PT W/DXA NO RESULTS DOC: HCPCS | Performed by: INTERNAL MEDICINE

## 2024-03-01 NOTE — PROGRESS NOTES
had concerns including Shortness of Breath, Hypertension, and Cholesterol Problem.    Vitals:    03/01/24 1544   BP: 130/72   Site: Left Upper Arm   Position: Sitting   Pulse: 94   SpO2: 97%   Weight: 104.3 kg (230 lb)   Height: 1.651 m (5' 5\")        Chest pain No    Refills No        1. Have you been to the ER, urgent care clinic since your last visit? No       Hospitalized since your last visit? No       Where?        Date?

## 2024-03-01 NOTE — PROGRESS NOTES
Dr. Evgeny Garduno [facility].  [phone]            Cardiology structural heart disease consult/Progress Note      Requesting/referring provider: Thao Dobbins MD Dr Doloresco    Reason for Consult: Multi valvular heart disease    Assessment/Plan:  1.  Moderate valvular aortic stenosis  2.  Moderate to severe mitral regurgitation  3.  Significant intracavity gradient from LV hypertrophy  4.  Shortness of breath multifactorial from 123  5.  Chronic lymphedema  6.  Hyperlipidemia      Blanche Mars is seen for evaluation of shortness of breath that she has been noticing for the past few years and correlates that to COVID vaccination.    Lower extremity edema predates her symptoms of shortness of breath and is probably unrelated and is from lymphedema.    Exact etiology of her shortness of breath is unclear but could be from a combination of her valvular aortic stenosis, mitral regurgitation as well as subvalvular/intracavity obstructive disease.  Recommended transesophageal echocardiogram to closely evaluate her mitral and aortic valve anatomy.  Do not think any medication is likely to help given complex anatomy.  Will have subsequent follow-up in valve clinic.    I discussed the risks/benefits/alternatives of the procedure with the patient. Risks include (but are not limited to) bleeding, infection,stroke,heart attack, need for emergency surgery/pericardiocentesis, need for dialysis or death. The patient understands and agrees to proceed.      Investigations ordered    []    High complexity decision making was performed  []    Patient is at high-risk of decompensation with multiple organ involvement  []    Complex/difficult social determinants of health outcomes  Total of ** minutes were spent on reviewing the records, analyzing investigations and documentation in the chart, on the day of visit including time for examination and time spent with the patient  Investigations

## 2024-03-21 NOTE — PROGRESS NOTES
CARDIOLOGY OFFICE NOTE    Bakari Randall MD, [facility]    [address], [place] [address]  Phone [phone]; Fax [phone]  Mobile [phone]   Voice Mail [phone]    Primary care: Thao Dobbins MD       ATTENTION:   This medical record was transcribed using an electronic medical records/speech recognition system.  Although proofread, it may and can contain electronic, spelling and other errors.  Corrections may be executed at a later time.  Please feel free to contact us for any clarifications as needed.             Blanche Mars is a 69 y.o. female with  referred for hypertension and dyslipidemia as well as elevated calcium score.       Cardiac risk factors: advanced age (older than 55 for men, 65 for women), dyslipidemia, hypertension, and obesity (BMI >= 30 kg/m2)      HISTORY OF PRESENTING ILLNESS   From previous note: Ms./Mr. Blanche Mars  69 y.o. is very nice lady who returns for follow-up.  Primarily she was coming in for hypertension and I placed her on amlodipine she is tolerating her blood pressures are much better today.  We reviewed her cholesterol profile which has improved but has not reached goal of less than 100 for her LDL.  Today we talked about going up on her Crestor to 40 mg and she is in agreement.  Only reason were going up despite not having any coronary artery disease based on the catheterization is because of her elevated calcium score in the 500s.    She is scheduled to have a SHALONDA next week and was seen in valve clinic.         ACTIVE PROBLEM LIST     Patient Active Problem List    Diagnosis Date Noted    Mitral regurgitation 03/01/2024    Abnormal stress test 01/02/2024    Aortic stenosis 01/02/2024    Osteoarthritis of left knee 01/15/2020           PAST MEDICAL HISTORY     Past Medical History:   Diagnosis Date    Anxiety     Benign heart murmur     Hypertension     Lymphedema     bilateral LE    RA (rheumatoid arthritis) (HCC)

## 2024-03-21 NOTE — PATIENT INSTRUCTIONS

## 2024-03-22 ENCOUNTER — OFFICE VISIT (OUTPATIENT)
Age: 69
End: 2024-03-22
Payer: MEDICARE

## 2024-03-22 VITALS
BODY MASS INDEX: 38.85 KG/M2 | SYSTOLIC BLOOD PRESSURE: 130 MMHG | OXYGEN SATURATION: 96 % | DIASTOLIC BLOOD PRESSURE: 72 MMHG | HEART RATE: 80 BPM | WEIGHT: 233.2 LBS | HEIGHT: 65 IN

## 2024-03-22 DIAGNOSIS — I10 PRIMARY HYPERTENSION: ICD-10-CM

## 2024-03-22 DIAGNOSIS — E78.5 DYSLIPIDEMIA: ICD-10-CM

## 2024-03-22 DIAGNOSIS — I35.0 NONRHEUMATIC AORTIC VALVE STENOSIS: Primary | ICD-10-CM

## 2024-03-22 DIAGNOSIS — E78.2 MIXED HYPERLIPIDEMIA: ICD-10-CM

## 2024-03-22 DIAGNOSIS — R06.02 SHORTNESS OF BREATH: ICD-10-CM

## 2024-03-22 DIAGNOSIS — I34.0 MITRAL VALVE INSUFFICIENCY, UNSPECIFIED ETIOLOGY: ICD-10-CM

## 2024-03-22 DIAGNOSIS — E66.9 OBESITY (BMI 30-39.9): ICD-10-CM

## 2024-03-22 DIAGNOSIS — E78.5 DYSLIPIDEMIA: Primary | ICD-10-CM

## 2024-03-22 PROCEDURE — 3078F DIAST BP <80 MM HG: CPT | Performed by: SPECIALIST

## 2024-03-22 PROCEDURE — G8427 DOCREV CUR MEDS BY ELIG CLIN: HCPCS | Performed by: SPECIALIST

## 2024-03-22 PROCEDURE — 3075F SYST BP GE 130 - 139MM HG: CPT | Performed by: SPECIALIST

## 2024-03-22 PROCEDURE — 99214 OFFICE O/P EST MOD 30 MIN: CPT | Performed by: SPECIALIST

## 2024-03-22 PROCEDURE — 1036F TOBACCO NON-USER: CPT | Performed by: SPECIALIST

## 2024-03-22 PROCEDURE — 1123F ACP DISCUSS/DSCN MKR DOCD: CPT | Performed by: SPECIALIST

## 2024-03-22 PROCEDURE — 3017F COLORECTAL CA SCREEN DOC REV: CPT | Performed by: SPECIALIST

## 2024-03-22 PROCEDURE — 1090F PRES/ABSN URINE INCON ASSESS: CPT | Performed by: SPECIALIST

## 2024-03-22 PROCEDURE — G8484 FLU IMMUNIZE NO ADMIN: HCPCS | Performed by: SPECIALIST

## 2024-03-22 PROCEDURE — G8417 CALC BMI ABV UP PARAM F/U: HCPCS | Performed by: SPECIALIST

## 2024-03-22 PROCEDURE — G8400 PT W/DXA NO RESULTS DOC: HCPCS | Performed by: SPECIALIST

## 2024-03-22 RX ORDER — ROSUVASTATIN CALCIUM 40 MG/1
40 TABLET, COATED ORAL EVERY EVENING
Qty: 90 TABLET | Refills: 3 | Status: SHIPPED | OUTPATIENT
Start: 2024-03-22

## 2024-03-22 RX ORDER — ROSUVASTATIN CALCIUM 40 MG/1
40 TABLET, COATED ORAL EVERY EVENING
COMMUNITY
End: 2024-03-22 | Stop reason: SDUPTHER

## 2024-03-22 NOTE — PROGRESS NOTES
Chief Complaint   Patient presents with    Shortness of Breath    Hypertension    Other     DYSLIPIDEMIA     Vitals:    24 0907   BP: 130/72   Site: Left Upper Arm   Position: Sitting   Pulse: 80   SpO2: 96%   Weight: 105.8 kg (233 lb 3.2 oz)   Height: 1.651 m (5' 5\")     Chest pain: DENIED     Recent hospital stays: DENIED     Refills: DENIED     NAME Blanche MEHTA    1955      MRN    [id number]      LAST OFFICE APPOINTMENT: 2024     DIAGNOSIS    ICD-10-CM    1. Nonrheumatic aortic valve stenosis  I35.0       2. Primary hypertension  I10       3. Shortness of breath  R06.02       4. Mixed hyperlipidemia  E78.2       5. Dyslipidemia  E78.5       6. Obesity (BMI 30-39.9)  E66.9           HOME MEDICATION  Current Outpatient Medications   Medication Sig    amLODIPine (NORVASC) 5 MG tablet Take 1 tablet by mouth daily    Folic Acid 5 MG CAPS Take by mouth    b complex vitamins capsule Take 1 capsule by mouth daily    Coenzyme Q10 (CO Q 10) 10 MG CAPS Take by mouth    Cholecalciferol (VITAMIN D3) 1.25 MG (21316 UT) CAPS Take by mouth    Magnesium Glycinate 100 MG CAPS Take by mouth    rosuvastatin (CRESTOR) 20 MG tablet Take 1 tablet by mouth daily    methotrexate (RHEUMATREX) 2.5 MG chemo tablet Take by mouth once a week (Patient not taking: Reported on 2024)     No current facility-administered medications for this visit.       VITAL SIGNS  Wt Readings from Last 3 Encounters:   24 105.8 kg (233 lb 3.2 oz)   24 104.3 kg (230 lb)   24 104.2 kg (229 lb 12.8 oz)     BP Readings from Last 3 Encounters:   24 130/72   24 130/72   24 (!) 158/92     Pulse Readings from Last 3 Encounters:   24 80   24 94   24 83         SPECIALTY COMMENTS  1. Lipids  23- , HDL 65, , TG 60  10/30/23- , HDL 56, , TG 63  24- , HDL 60, .2, TG 69    2. Calcium Score  23- Total score 561  LM 0      L

## 2024-03-28 ENCOUNTER — TELEPHONE (OUTPATIENT)
Age: 69
End: 2024-03-28

## 2024-03-28 NOTE — TELEPHONE ENCOUNTER
Left Message For Pt:       Just a reminder not to forget you SHALONDA scheduled for tomorrow.  Arriving at 10:30am  You will need a  must stay on site  NPO from midnight   check in at the 1st Floor outpt. reg. Desk.  Medications:  Ok to take  VO by /nurse Erica TAMAYO

## 2024-03-29 ENCOUNTER — TELEPHONE (OUTPATIENT)
Age: 69
End: 2024-03-29

## 2024-03-29 ENCOUNTER — HOSPITAL ENCOUNTER (OUTPATIENT)
Facility: HOSPITAL | Age: 69
Discharge: HOME | End: 2024-03-29
Attending: INTERNAL MEDICINE | Admitting: INTERNAL MEDICINE
Payer: MEDICARE

## 2024-03-29 VITALS
HEIGHT: 65 IN | DIASTOLIC BLOOD PRESSURE: 83 MMHG | BODY MASS INDEX: 38.86 KG/M2 | WEIGHT: 233.25 LBS | OXYGEN SATURATION: 95 % | HEART RATE: 67 BPM | SYSTOLIC BLOOD PRESSURE: 108 MMHG | RESPIRATION RATE: 15 BRPM

## 2024-03-29 DIAGNOSIS — I38 ENDOCARDITIS: ICD-10-CM

## 2024-03-29 LAB
ECHO AV MEAN GRADIENT: 16 MMHG
ECHO AV MEAN VELOCITY: 1.8 M/S
ECHO AV PEAK GRADIENT: 33 MMHG
ECHO AV PEAK VELOCITY: 2.8 M/S
ECHO AV VELOCITY RATIO: 1.07
ECHO AV VTI: 64.6 CM
ECHO BSA: 2.2 M2
ECHO LVOT AV VTI INDEX: 0.97
ECHO LVOT MEAN GRADIENT: 14 MMHG
ECHO LVOT PEAK GRADIENT: 39 MMHG
ECHO LVOT PEAK VELOCITY: 3 M/S
ECHO LVOT VTI: 62.6 CM
ECHO MV AREA PHT: 1.6 CM2
ECHO MV LVOT VTI INDEX: 0.65
ECHO MV MAX VELOCITY: 1.4 M/S
ECHO MV MEAN GRADIENT: 5 MMHG
ECHO MV MEAN VELOCITY: 1 M/S
ECHO MV PEAK GRADIENT: 7 MMHG
ECHO MV PRESSURE HALF TIME (PHT): 141.8 MS
ECHO MV VTI: 40.9 CM

## 2024-03-29 PROCEDURE — 99153 MOD SED SAME PHYS/QHP EA: CPT

## 2024-03-29 PROCEDURE — 99152 MOD SED SAME PHYS/QHP 5/>YRS: CPT

## 2024-03-29 PROCEDURE — 93320 DOPPLER ECHO COMPLETE: CPT

## 2024-03-29 PROCEDURE — 99152 MOD SED SAME PHYS/QHP 5/>YRS: CPT | Performed by: INTERNAL MEDICINE

## 2024-03-29 PROCEDURE — 6360000002 HC RX W HCPCS: Performed by: INTERNAL MEDICINE

## 2024-03-29 PROCEDURE — 6370000000 HC RX 637 (ALT 250 FOR IP): Performed by: INTERNAL MEDICINE

## 2024-03-29 RX ORDER — LIDOCAINE HYDROCHLORIDE 20 MG/ML
SOLUTION OROPHARYNGEAL
Status: DISCONTINUED
Start: 2024-03-29 | End: 2024-03-29 | Stop reason: HOSPADM

## 2024-03-29 RX ORDER — LIDOCAINE 50 MG/G
OINTMENT TOPICAL
Status: DISCONTINUED
Start: 2024-03-29 | End: 2024-03-29 | Stop reason: HOSPADM

## 2024-03-29 RX ORDER — FENTANYL CITRATE 50 UG/ML
INJECTION, SOLUTION INTRAMUSCULAR; INTRAVENOUS PRN
Status: COMPLETED | OUTPATIENT
Start: 2024-03-29 | End: 2024-03-29

## 2024-03-29 RX ORDER — MIDAZOLAM HYDROCHLORIDE 1 MG/ML
INJECTION INTRAMUSCULAR; INTRAVENOUS PRN
Status: COMPLETED | OUTPATIENT
Start: 2024-03-29 | End: 2024-03-29

## 2024-03-29 RX ORDER — LIDOCAINE HYDROCHLORIDE 20 MG/ML
JELLY TOPICAL PRN
Status: COMPLETED | OUTPATIENT
Start: 2024-03-29 | End: 2024-03-29

## 2024-03-29 RX ORDER — LIDOCAINE 50 MG/G
OINTMENT TOPICAL PRN
Status: COMPLETED | OUTPATIENT
Start: 2024-03-29 | End: 2024-03-29

## 2024-03-29 RX ORDER — MIDAZOLAM HYDROCHLORIDE 1 MG/ML
INJECTION INTRAMUSCULAR; INTRAVENOUS
Status: DISCONTINUED
Start: 2024-03-29 | End: 2024-03-29 | Stop reason: HOSPADM

## 2024-03-29 RX ORDER — LIDOCAINE HYDROCHLORIDE 20 MG/ML
JELLY TOPICAL
Status: DISCONTINUED
Start: 2024-03-29 | End: 2024-03-29 | Stop reason: HOSPADM

## 2024-03-29 RX ORDER — FENTANYL CITRATE 50 UG/ML
INJECTION, SOLUTION INTRAMUSCULAR; INTRAVENOUS
Status: DISCONTINUED
Start: 2024-03-29 | End: 2024-03-29 | Stop reason: HOSPADM

## 2024-03-29 RX ORDER — LIDOCAINE HYDROCHLORIDE 20 MG/ML
SOLUTION OROPHARYNGEAL PRN
Status: COMPLETED | OUTPATIENT
Start: 2024-03-29 | End: 2024-03-29

## 2024-03-29 RX ADMIN — MIDAZOLAM HYDROCHLORIDE 2 MG: 1 INJECTION, SOLUTION INTRAMUSCULAR; INTRAVENOUS at 11:45

## 2024-03-29 RX ADMIN — FENTANYL CITRATE 50 MCG: 50 INJECTION, SOLUTION INTRAMUSCULAR; INTRAVENOUS at 11:45

## 2024-03-29 RX ADMIN — LIDOCAINE 5 ML: 50 OINTMENT TOPICAL at 11:25

## 2024-03-29 RX ADMIN — LIDOCAINE HYDROCHLORIDE 15 ML: 20 SOLUTION ORAL; TOPICAL at 11:15

## 2024-03-29 RX ADMIN — MIDAZOLAM HYDROCHLORIDE 2 MG: 1 INJECTION, SOLUTION INTRAMUSCULAR; INTRAVENOUS at 11:53

## 2024-03-29 RX ADMIN — FENTANYL CITRATE 25 MCG: 50 INJECTION, SOLUTION INTRAMUSCULAR; INTRAVENOUS at 11:55

## 2024-03-29 RX ADMIN — FENTANYL CITRATE 25 MCG: 50 INJECTION, SOLUTION INTRAMUSCULAR; INTRAVENOUS at 11:48

## 2024-03-29 RX ADMIN — MIDAZOLAM HYDROCHLORIDE 1 MG: 1 INJECTION, SOLUTION INTRAMUSCULAR; INTRAVENOUS at 11:48

## 2024-03-29 RX ADMIN — LIDOCAINE HYDROCHLORIDE 5 ML: 20 JELLY TOPICAL at 11:57

## 2024-03-29 NOTE — H&P
Dr. Evgeny Garduno [facility].  [phone]            Cardiology structural heart disease consult/Progress Note      Requesting/referring provider: Thao Dobbins MD Dr Doloresco    Reason for Consult: Multi valvular heart disease    Assessment/Plan:  1.  Moderate valvular aortic stenosis  2.  Moderate to severe mitral regurgitation  3.  Significant intracavity gradient from LV hypertrophy  4.  Shortness of breath multifactorial from 123  5.  Chronic lymphedema  6.  Hyperlipidemia      Blanche Mars is seen for evaluation of shortness of breath that she has been noticing for the past few years and correlates that to COVID vaccination.    Lower extremity edema predates her symptoms of shortness of breath and is probably unrelated and is from lymphedema.    Exact etiology of her shortness of breath is unclear but could be from a combination of her valvular aortic stenosis, mitral regurgitation as well as subvalvular/intracavity obstructive disease.  Recommended transesophageal echocardiogram to closely evaluate her mitral and aortic valve anatomy.  Do not think any medication is likely to help given complex anatomy.  Will have subsequent follow-up in valve clinic.    I discussed the risks/benefits/alternatives of the procedure with the patient. Risks include (but are not limited to) bleeding, infection,stroke,heart attack, need for emergency surgery/pericardiocentesis, need for dialysis or death. The patient understands and agrees to proceed.      Investigations ordered    []    High complexity decision making was performed  []    Patient is at high-risk of decompensation with multiple organ involvement  []    Complex/difficult social determinants of health outcomes  Total of ** minutes were spent on reviewing the records, analyzing investigations and documentation in the chart, on the day of visit including time for examination and time spent with the patient  Investigations  rate and rhythm, S1 normal S2 soft/attenuated but audible, mid peaking midsystolic murmur best heard in right upper sternal border, click, rub or gallop.No JVD, Normal palpable peripheral pulses. No cyanosis   Abdomen:     Soft, non-tender. Bowel sounds normal. No masses,  No      organomegaly.   Extremities:   Extremities normal, atraumatic, no edema.   Neurologic:   CN II-XII grossly intact. No gross focal deficits             Recent Labs:  Lab Results   Component Value Date/Time     01/04/2024 09:37 AM    K 4.8 01/04/2024 09:37 AM     01/04/2024 09:37 AM    CO2 26 01/04/2024 09:37 AM    BUN 23 01/04/2024 09:37 AM    CREATININE 0.66 01/04/2024 09:37 AM    GLUCOSE 93 01/04/2024 09:37 AM    CALCIUM 9.0 01/04/2024 09:37 AM    LABGLOM >60 01/04/2024 09:37 AM      Lab Results   Component Value Date    WBC 4.3 01/18/2024    HGB 14.4 01/18/2024    HCT 41.6 01/18/2024    MCV 96.7 01/18/2024     (L) 01/18/2024     Lab Results   Component Value Date    CHOL 222 (H) 01/23/2024     Lab Results   Component Value Date    TRIG 69 01/23/2024     Lab Results   Component Value Date    HDL 60 01/23/2024     Lab Results   Component Value Date    LDLCALC 148.2 (H) 01/23/2024     No results found for: \"VLDL\"  Lab Results   Component Value Date    CHOLHDLRATIO 3.7 01/23/2024             Investigations reviewed  12/19/23    ECHO (TTE) COMPLETE (PRN CONTRAST/BUBBLE/STRAIN/3D) 12/19/2023  1:10 PM (Final)    Interpretation Summary    Left Ventricle: Normal left ventricular systolic function with a visually estimated EF of 65 - 70%. Left ventricle size is normal. Moderate septal thickening. IVSd is 1.3 cm. Sigmoid septum. Normal wall motion. Abnormal diastolic function. The LVOT peak gradient at rest is 24 mmHg.   The LVOT peak gradient at valvalva is 47 mmHg.    Left Atrium: Left atrium is severely dilated. Left atrial volume index is severely increased (>48 mL/m2). LA Vol Index A/L is 55 mL/m2.    Aortic Valve:

## 2024-03-29 NOTE — PROGRESS NOTES
Discharge instructions reviewed with patient and daughter, Dory. Allowed adequate time to ask questions, all questions answered. Printed copy of AVS given to patient. All belongings gathered, IV and tele discontinued. Transported via wheelchair to main entrance and into care of family.

## 2024-03-29 NOTE — PROGRESS NOTES
Patient arrived to Non-Invasive Cardiology Lab for Out Patient SHALONDA Procedure. Staff introduced to patient. Patient identifiers verified with Name and Date of Birth. Procedure verified with patient. Consent forms reviewed and signed by patient or authorized representative and verified. Allergies verified. Patient informed of procedure and plan of care. Questions answered with review.     Patient on cardiac monitor, non-invasive blood pressure, SPO2 monitor. Patient is A&Ox3. Patient reports no complaints. Patient belongings and valuables to remain in the room with patient.    Patient on stretcher, in low position, with side rails up and brakes locked. Patient instructed to call for assistance as needed.    Family in waiting room.

## 2024-04-08 NOTE — PROGRESS NOTES
oriented. Appears stated age. Accompanied by daughter.    Neuro:  Gross motor and sensory apparatus intact   Neck:  Supple, trachea Midline   Heart:  Grade II murmur right of sternal border   Lungs:    clear to auscultation bilaterally   Abdomen:    soft, not-distended, non-tender and obese   Extremities:  Chronic lymphedema   Skin:  No obvious s/s of infection or breakdown         Clinic Evaluation:   KCCQ-12: scanned into EMR    6 minute walk test: N/A  5 meter gait: See Renetta Koch' note  Frality Survey:  Beal Index ADL -  scanned into EMR     STS 4.2 Risk Score / Predicted 30 day mortality: - calculations scanned into EMR  Pending completion of workup    Assessment/Plan:     Mod to severe MR, MS, septal bulge, moderate AS: Patient to be discussed in valve conference. Likely will need myomectomy at the time of MVR. Will have patient get dental clearance while awaiting surgical plan.  HTN, well-controlled: On Norvasc PTA; continue.   HLD: on statin PTA; continue.   RA?: No meds currently.   Chronic lymphedema: Supportive care.     Time spent: 30 minutes.

## 2024-04-10 ENCOUNTER — TELEPHONE (OUTPATIENT)
Age: 69
End: 2024-04-10

## 2024-04-18 ENCOUNTER — INITIAL CONSULT (OUTPATIENT)
Age: 69
End: 2024-04-18

## 2024-04-18 VITALS
SYSTOLIC BLOOD PRESSURE: 123 MMHG | TEMPERATURE: 98.5 F | WEIGHT: 231 LBS | BODY MASS INDEX: 38.44 KG/M2 | HEART RATE: 103 BPM | DIASTOLIC BLOOD PRESSURE: 85 MMHG | OXYGEN SATURATION: 94 %

## 2024-04-18 DIAGNOSIS — I34.0 MITRAL VALVE INSUFFICIENCY, UNSPECIFIED ETIOLOGY: Primary | ICD-10-CM

## 2024-04-18 DIAGNOSIS — I35.0 NONRHEUMATIC AORTIC VALVE STENOSIS: ICD-10-CM

## 2024-04-18 PROCEDURE — 3017F COLORECTAL CA SCREEN DOC REV: CPT | Performed by: THORACIC SURGERY (CARDIOTHORACIC VASCULAR SURGERY)

## 2024-04-18 PROCEDURE — 1036F TOBACCO NON-USER: CPT | Performed by: THORACIC SURGERY (CARDIOTHORACIC VASCULAR SURGERY)

## 2024-05-15 ENCOUNTER — PREP FOR PROCEDURE (OUTPATIENT)
Age: 69
End: 2024-05-15

## 2024-05-15 ENCOUNTER — OFFICE VISIT (OUTPATIENT)
Age: 69
End: 2024-05-15
Payer: MEDICARE

## 2024-05-15 DIAGNOSIS — I35.0 NONRHEUMATIC AORTIC VALVE STENOSIS: ICD-10-CM

## 2024-05-15 DIAGNOSIS — I34.0 NONRHEUMATIC MITRAL VALVE REGURGITATION: Primary | ICD-10-CM

## 2024-05-15 PROCEDURE — 1090F PRES/ABSN URINE INCON ASSESS: CPT | Performed by: THORACIC SURGERY (CARDIOTHORACIC VASCULAR SURGERY)

## 2024-05-15 PROCEDURE — 1123F ACP DISCUSS/DSCN MKR DOCD: CPT | Performed by: THORACIC SURGERY (CARDIOTHORACIC VASCULAR SURGERY)

## 2024-05-15 PROCEDURE — 1036F TOBACCO NON-USER: CPT | Performed by: THORACIC SURGERY (CARDIOTHORACIC VASCULAR SURGERY)

## 2024-05-15 PROCEDURE — G8417 CALC BMI ABV UP PARAM F/U: HCPCS | Performed by: THORACIC SURGERY (CARDIOTHORACIC VASCULAR SURGERY)

## 2024-05-15 PROCEDURE — 3017F COLORECTAL CA SCREEN DOC REV: CPT | Performed by: THORACIC SURGERY (CARDIOTHORACIC VASCULAR SURGERY)

## 2024-05-15 PROCEDURE — G8400 PT W/DXA NO RESULTS DOC: HCPCS | Performed by: THORACIC SURGERY (CARDIOTHORACIC VASCULAR SURGERY)

## 2024-05-15 PROCEDURE — G8427 DOCREV CUR MEDS BY ELIG CLIN: HCPCS | Performed by: THORACIC SURGERY (CARDIOTHORACIC VASCULAR SURGERY)

## 2024-05-15 PROCEDURE — 99214 OFFICE O/P EST MOD 30 MIN: CPT | Performed by: THORACIC SURGERY (CARDIOTHORACIC VASCULAR SURGERY)

## 2024-05-15 NOTE — PROGRESS NOTES
I agree with this consultation note.    I personally interviewed and examined the patient, and reviewed their diagnostic studies in detail.    I have also discussed the case in detail with the referring provider.    After discussing the risks, benefits and alternatives to surgery with the patient, they requested that we proceed with surgery.    We will plan on tissue AVR, tissue MVR and septal myectomy on June 11.    Saroj Miller MD, PhD, [facility].

## 2024-05-15 NOTE — PROGRESS NOTES
Diagnostic catheter inserted over exchange length wire. Patient: Blanche Mars   Age: 69 y.o.     Patient Care Team:  Thao Dobbins MD as PCP - General (Family Medicine)  Carlie Pepper MD as Consulting Physician (Cardiology)  Saroj Miller MD as Consulting Physician (Cardiothoracic Surgery)    PCP: Thao Dobbins MD    Cardiologist: Evgeny    Diagnosis/Reason for Consultation: There were no encounter diagnoses.    Problem List:   Patient Active Problem List   Diagnosis    Osteoarthritis of left knee    Abnormal stress test    Aortic stenosis    Mitral regurgitation         HPI: 69 y.o. female with PMHx of moderate AS, mod to severe MR, HLD, chronic lymphedema that is referred to [facility] by Dr. Wan for interventional evaluation of  MS, MR, AS .    Endorses SOB (Over the past few years; onset correlates with timing of COVID vax per patient), LE edema (chronic lymphedema), fatigue (Per daughter- sleepy in the afternoon), and palpitations (Occasionally has 'twinges\"). Denies CP, PND, orthopnea, syncope, and dizziness    PM/SH negative for CVA/TIA, difficulty swallowing, SAUL , thyroid disease, dysrhythmias, lung disease, prior PNA, previous thoracic surgery, trauma or radiation to chest wall, diabetes, kidney disease, liver disease, blood, blackness or tarriness of stool, frequent UTIs, PAD/PVD , vein stripping, issues with bleeding or blood clots, cancer within the past 5 years, hospitalizations for HF within the past year, and previous valve replacements or PPM.    Denies tobacco use, alcohol use, and drug use. Family history positive for CV disease (father had 3 heart attacks-  at 66) .     Functional status: Active- goes to [employer] three days a week for chair workouts, \"strength and balance\" and hi chi.       NYHA Classification: II   Class I (Mild): No limitation of physical activity. Ordinary physical activity does not cause undue fatigue, palpitation, or dyspnea.   Class II (Mild): Slight limitation of physical

## 2024-05-16 DIAGNOSIS — I35.0 NONRHEUMATIC AORTIC VALVE STENOSIS: Primary | ICD-10-CM

## 2024-06-05 ENCOUNTER — HOSPITAL ENCOUNTER (OUTPATIENT)
Facility: HOSPITAL | Age: 69
Discharge: HOME | End: 2024-06-07
Payer: MEDICARE

## 2024-06-05 ENCOUNTER — HOSPITAL ENCOUNTER (OUTPATIENT)
Facility: HOSPITAL | Age: 69
Discharge: HOME | End: 2024-06-08
Payer: MEDICARE

## 2024-06-05 ENCOUNTER — OFFICE VISIT (OUTPATIENT)
Age: 69
End: 2024-06-05

## 2024-06-05 VITALS
TEMPERATURE: 98 F | HEART RATE: 89 BPM | RESPIRATION RATE: 20 BRPM | BODY MASS INDEX: 37.39 KG/M2 | DIASTOLIC BLOOD PRESSURE: 73 MMHG | SYSTOLIC BLOOD PRESSURE: 134 MMHG | HEIGHT: 65 IN | WEIGHT: 224.4 LBS

## 2024-06-05 DIAGNOSIS — I35.0 NONRHEUMATIC AORTIC VALVE STENOSIS: ICD-10-CM

## 2024-06-05 DIAGNOSIS — I34.0 MITRAL VALVE INSUFFICIENCY, UNSPECIFIED ETIOLOGY: Primary | ICD-10-CM

## 2024-06-05 LAB
ABO + RH BLD: NORMAL
ALBUMIN SERPL-MCNC: 3.6 G/DL (ref 3.5–5)
ALBUMIN/GLOB SERPL: 1 (ref 1.1–2.2)
ALP SERPL-CCNC: 63 U/L (ref 45–117)
ALT SERPL-CCNC: 23 U/L (ref 12–78)
ANION GAP SERPL CALC-SCNC: 4 MMOL/L (ref 5–15)
APPEARANCE UR: CLEAR
APTT PPP: 29.3 SEC (ref 22.1–31)
ARTERIAL PATENCY WRIST A: POSITIVE
AST SERPL-CCNC: 16 U/L (ref 15–37)
BACTERIA URNS QL MICRO: NEGATIVE /HPF
BASE EXCESS BLD CALC-SCNC: 0.5 MMOL/L
BASOPHILS # BLD: 0 K/UL (ref 0–0.1)
BASOPHILS NFR BLD: 1 % (ref 0–1)
BDY SITE: ABNORMAL
BILIRUB SERPL-MCNC: 0.7 MG/DL (ref 0.2–1)
BILIRUB UR QL: NEGATIVE
BLOOD GROUP ANTIBODIES SERPL: NORMAL
BUN SERPL-MCNC: 19 MG/DL (ref 6–20)
BUN/CREAT SERPL: 31 (ref 12–20)
CALCIUM SERPL-MCNC: 9.6 MG/DL (ref 8.5–10.1)
CHLORIDE SERPL-SCNC: 107 MMOL/L (ref 97–108)
CO2 SERPL-SCNC: 28 MMOL/L (ref 21–32)
COLOR UR: ABNORMAL
CREAT SERPL-MCNC: 0.61 MG/DL (ref 0.55–1.02)
DIFFERENTIAL METHOD BLD: NORMAL
EKG ATRIAL RATE: 72 BPM
EKG DIAGNOSIS: NORMAL
EKG P AXIS: 58 DEGREES
EKG P-R INTERVAL: 214 MS
EKG Q-T INTERVAL: 454 MS
EKG QRS DURATION: 118 MS
EKG QTC CALCULATION (BAZETT): 497 MS
EKG R AXIS: -45 DEGREES
EKG T AXIS: 73 DEGREES
EKG VENTRICULAR RATE: 72 BPM
EOSINOPHIL # BLD: 0.1 K/UL (ref 0–0.4)
EOSINOPHIL NFR BLD: 2 % (ref 0–7)
EPITH CASTS URNS QL MICRO: ABNORMAL /LPF
ERYTHROCYTE [DISTWIDTH] IN BLOOD BY AUTOMATED COUNT: 12.7 % (ref 11.5–14.5)
EST. AVERAGE GLUCOSE BLD GHB EST-MCNC: 97 MG/DL
GAS FLOW.O2 O2 DELIVERY SYS: ABNORMAL
GLOBULIN SER CALC-MCNC: 3.5 G/DL (ref 2–4)
GLUCOSE SERPL-MCNC: 97 MG/DL (ref 65–100)
GLUCOSE UR STRIP.AUTO-MCNC: NEGATIVE MG/DL
HBA1C MFR BLD: 5 % (ref 4–5.6)
HCO3 BLD-SCNC: 24 MMOL/L (ref 22–26)
HCT VFR BLD AUTO: 42.6 % (ref 35–47)
HGB BLD-MCNC: 14.5 G/DL (ref 11.5–16)
HGB UR QL STRIP: NEGATIVE
HISTORY CHECK: NORMAL
IMM GRANULOCYTES # BLD AUTO: 0 K/UL (ref 0–0.04)
IMM GRANULOCYTES NFR BLD AUTO: 0 % (ref 0–0.5)
INR PPP: 1 (ref 0.9–1.1)
KETONES UR QL STRIP.AUTO: NEGATIVE MG/DL
LEUKOCYTE ESTERASE UR QL STRIP.AUTO: NEGATIVE
LYMPHOCYTES # BLD: 1.4 K/UL (ref 0.8–3.5)
LYMPHOCYTES NFR BLD: 30 % (ref 12–49)
MAGNESIUM SERPL-MCNC: 2.4 MG/DL (ref 1.6–2.4)
MCH RBC QN AUTO: 33.2 PG (ref 26–34)
MCHC RBC AUTO-ENTMCNC: 34 G/DL (ref 30–36.5)
MCV RBC AUTO: 97.5 FL (ref 80–99)
MONOCYTES # BLD: 0.5 K/UL (ref 0–1)
MONOCYTES NFR BLD: 11 % (ref 5–13)
NEUTS SEG # BLD: 2.6 K/UL (ref 1.8–8)
NEUTS SEG NFR BLD: 56 % (ref 32–75)
NITRITE UR QL STRIP.AUTO: NEGATIVE
NRBC # BLD: 0 K/UL (ref 0–0.01)
NRBC BLD-RTO: 0 PER 100 WBC
NT PRO BNP: 148 PG/ML
O2/TOTAL GAS SETTING VFR VENT: 21 %
PCO2 BLD: 34.7 MMHG (ref 35–45)
PH BLD: 7.45 (ref 7.35–7.45)
PH UR STRIP: 5.5 (ref 5–8)
PLATELET # BLD AUTO: 154 K/UL (ref 150–400)
PMV BLD AUTO: 10.3 FL (ref 8.9–12.9)
PO2 BLD: 97 MMHG (ref 80–100)
POTASSIUM SERPL-SCNC: 3.9 MMOL/L (ref 3.5–5.1)
PROT SERPL-MCNC: 7.1 G/DL (ref 6.4–8.2)
PROT UR STRIP-MCNC: NEGATIVE MG/DL
PROTHROMBIN TIME: 10.5 SEC (ref 9–11.1)
RBC # BLD AUTO: 4.37 M/UL (ref 3.8–5.2)
RBC #/AREA URNS HPF: ABNORMAL /HPF (ref 0–5)
SAO2 % BLD: 97.9 % (ref 92–97)
SARS-COV-2 RNA RESP QL NAA+PROBE: NOT DETECTED
SODIUM SERPL-SCNC: 139 MMOL/L (ref 136–145)
SOURCE: NORMAL
SP GR UR REFRACTOMETRY: 1.02 (ref 1–1.03)
SPECIMEN EXP DATE BLD: NORMAL
SPECIMEN TYPE: ABNORMAL
THERAPEUTIC RANGE: NORMAL SECS (ref 58–77)
TSH SERPL DL<=0.05 MIU/L-ACNC: 2.78 UIU/ML (ref 0.36–3.74)
URINE CULTURE IF INDICATED: ABNORMAL
UROBILINOGEN UR QL STRIP.AUTO: 0.2 EU/DL (ref 0.2–1)
WBC # BLD AUTO: 4.7 K/UL (ref 3.6–11)
WBC URNS QL MICRO: ABNORMAL /HPF (ref 0–4)
YEAST URNS QL MICRO: PRESENT

## 2024-06-05 PROCEDURE — 86901 BLOOD TYPING SEROLOGIC RH(D): CPT

## 2024-06-05 PROCEDURE — 84443 ASSAY THYROID STIM HORMONE: CPT

## 2024-06-05 PROCEDURE — 83880 ASSAY OF NATRIURETIC PEPTIDE: CPT

## 2024-06-05 PROCEDURE — 94060 EVALUATION OF WHEEZING: CPT

## 2024-06-05 PROCEDURE — 71046 X-RAY EXAM CHEST 2 VIEWS: CPT

## 2024-06-05 PROCEDURE — 94729 DIFFUSING CAPACITY: CPT

## 2024-06-05 PROCEDURE — 85025 COMPLETE CBC W/AUTO DIFF WBC: CPT

## 2024-06-05 PROCEDURE — 80053 COMPREHEN METABOLIC PANEL: CPT

## 2024-06-05 PROCEDURE — 87635 SARS-COV-2 COVID-19 AMP PRB: CPT

## 2024-06-05 PROCEDURE — 82803 BLOOD GASES ANY COMBINATION: CPT

## 2024-06-05 PROCEDURE — 36600 WITHDRAWAL OF ARTERIAL BLOOD: CPT

## 2024-06-05 PROCEDURE — 94010 BREATHING CAPACITY TEST: CPT

## 2024-06-05 PROCEDURE — 85610 PROTHROMBIN TIME: CPT

## 2024-06-05 PROCEDURE — 94726 PLETHYSMOGRAPHY LUNG VOLUMES: CPT

## 2024-06-05 PROCEDURE — 86850 RBC ANTIBODY SCREEN: CPT

## 2024-06-05 PROCEDURE — 93005 ELECTROCARDIOGRAM TRACING: CPT | Performed by: THORACIC SURGERY (CARDIOTHORACIC VASCULAR SURGERY)

## 2024-06-05 PROCEDURE — 93922 UPR/L XTREMITY ART 2 LEVELS: CPT

## 2024-06-05 PROCEDURE — 83735 ASSAY OF MAGNESIUM: CPT

## 2024-06-05 PROCEDURE — 86900 BLOOD TYPING SEROLOGIC ABO: CPT

## 2024-06-05 PROCEDURE — 36415 COLL VENOUS BLD VENIPUNCTURE: CPT

## 2024-06-05 PROCEDURE — 85730 THROMBOPLASTIN TIME PARTIAL: CPT

## 2024-06-05 PROCEDURE — 83036 HEMOGLOBIN GLYCOSYLATED A1C: CPT

## 2024-06-05 PROCEDURE — 93880 EXTRACRANIAL BILAT STUDY: CPT

## 2024-06-05 PROCEDURE — 81001 URINALYSIS AUTO W/SCOPE: CPT

## 2024-06-05 RX ORDER — AMIODARONE HYDROCHLORIDE 200 MG/1
400 TABLET ORAL 2 TIMES DAILY
Qty: 20 TABLET | Refills: 0 | Status: SHIPPED | OUTPATIENT
Start: 2024-06-06 | End: 2024-06-11

## 2024-06-05 RX ORDER — CHLORHEXIDINE GLUCONATE ORAL RINSE 1.2 MG/ML
15 SOLUTION DENTAL 2 TIMES DAILY
Qty: 90 ML | Refills: 0 | Status: SHIPPED | OUTPATIENT
Start: 2024-06-09 | End: 2024-06-12

## 2024-06-05 NOTE — PROGRESS NOTES
H&P encounter only.    Discussed starting:  Amiodarone 400 mg BID beginning 6/6  Bactroban starting 6/9  Peridex starting 6/9    Discussed stopping coenzyme today

## 2024-06-05 NOTE — PERIOP NOTE
CSS/PAT: 6-5 @ 8;00 CONFIRMATION FAXED TO [facility]  
around it.   Take a deep breath.  Breathe in slowly and as deeply as possible. Keep the blue flow rate guide between the arrows.   Hold your breath as long as possible. Then exhale slowly and allow the piston to fall to the bottom of the column.   Rest for a few seconds and repeat steps one through five at least 10 times.         Follow all instructions so your surgery won’t be cancelled.  Please, be on time.                    If a situation occurs and you are delayed the day of surgery, call [phone]/ [phone].    If your physical condition changes (like a fever, cold, flu, etc.) call your surgeon as soon as possible.    Home medication(s) reviewed and verified via during PAT appointment/call.    The patient was contacted in person.     She verbalized understanding of all instructions does not need reinforcement.

## 2024-06-06 LAB
ECHO BSA: 2.16 M2
ECHO BSA: 2.16 M2
VAS LEFT ABI: 1.06
VAS LEFT ARM BP: 146 MMHG
VAS LEFT CCA DIST EDV: 13.4 CM/S
VAS LEFT CCA DIST PSV: 72.3 CM/S
VAS LEFT CCA PROX EDV: 10.8 CM/S
VAS LEFT CCA PROX PSV: 116.7 CM/S
VAS LEFT DORSALIS PEDIS BP: 149 MMHG
VAS LEFT ECA EDV: 8.5 CM/S
VAS LEFT ECA PSV: 92 CM/S
VAS LEFT ICA DIST EDV: 17.1 CM/S
VAS LEFT ICA DIST PSV: 65.2 CM/S
VAS LEFT ICA MID EDV: 18.8 CM/S
VAS LEFT ICA MID PSV: 78.3 CM/S
VAS LEFT ICA PROX EDV: 9.4 CM/S
VAS LEFT ICA PROX PSV: 48.1 CM/S
VAS LEFT ICA/CCA PSV: 1.1 NO UNITS
VAS LEFT PTA BP: 159 MMHG
VAS LEFT VERTEBRAL EDV: 13.1 CM/S
VAS LEFT VERTEBRAL PSV: 45.2 CM/S
VAS RIGHT ABI: 1.07
VAS RIGHT ARM BP: 150 MMHG
VAS RIGHT CCA DIST EDV: 16.3 CM/S
VAS RIGHT CCA DIST PSV: 91.1 CM/S
VAS RIGHT CCA PROX EDV: 18.1 CM/S
VAS RIGHT CCA PROX PSV: 103.9 CM/S
VAS RIGHT DORSALIS PEDIS BP: 148 MMHG
VAS RIGHT ECA EDV: 7.1 CM/S
VAS RIGHT ECA PSV: 103.9 CM/S
VAS RIGHT ICA DIST EDV: 20.8 CM/S
VAS RIGHT ICA DIST PSV: 79 CM/S
VAS RIGHT ICA MID EDV: 19.7 CM/S
VAS RIGHT ICA MID PSV: 85.6 CM/S
VAS RIGHT ICA PROX EDV: 12.2 CM/S
VAS RIGHT ICA PROX PSV: 50 CM/S
VAS RIGHT ICA/CCA PSV: 0.9 NO UNITS
VAS RIGHT PTA BP: 161 MMHG
VAS RIGHT VERTEBRAL EDV: 10.4 CM/S
VAS RIGHT VERTEBRAL PSV: 42.7 CM/S

## 2024-06-10 ENCOUNTER — TELEPHONE (OUTPATIENT)
Age: 69
End: 2024-06-10

## 2024-06-10 RX ORDER — SODIUM CHLORIDE 0.9 % (FLUSH) 0.9 %
5-40 SYRINGE (ML) INJECTION PRN
Status: CANCELLED | OUTPATIENT
Start: 2024-06-10

## 2024-06-10 RX ORDER — MIDAZOLAM HYDROCHLORIDE 2 MG/2ML
2 INJECTION, SOLUTION INTRAMUSCULAR; INTRAVENOUS
Status: CANCELLED | OUTPATIENT
Start: 2024-06-10 | End: 2024-06-11

## 2024-06-10 RX ORDER — OXYCODONE HYDROCHLORIDE 5 MG/1
5 TABLET ORAL
Status: CANCELLED | OUTPATIENT
Start: 2024-06-10 | End: 2024-06-11

## 2024-06-10 RX ORDER — HYDROMORPHONE HYDROCHLORIDE 1 MG/ML
0.5 INJECTION, SOLUTION INTRAMUSCULAR; INTRAVENOUS; SUBCUTANEOUS EVERY 5 MIN PRN
Status: CANCELLED | OUTPATIENT
Start: 2024-06-10

## 2024-06-10 RX ORDER — PROCHLORPERAZINE EDISYLATE 5 MG/ML
5 INJECTION INTRAMUSCULAR; INTRAVENOUS
Status: CANCELLED | OUTPATIENT
Start: 2024-06-10 | End: 2024-06-11

## 2024-06-10 RX ORDER — SODIUM CHLORIDE 9 MG/ML
INJECTION, SOLUTION INTRAVENOUS PRN
Status: CANCELLED | OUTPATIENT
Start: 2024-06-10

## 2024-06-10 RX ORDER — FENTANYL CITRATE 50 UG/ML
25 INJECTION, SOLUTION INTRAMUSCULAR; INTRAVENOUS EVERY 5 MIN PRN
Status: CANCELLED | OUTPATIENT
Start: 2024-06-10

## 2024-06-10 RX ORDER — LABETALOL HYDROCHLORIDE 5 MG/ML
10 INJECTION, SOLUTION INTRAVENOUS
Status: CANCELLED | OUTPATIENT
Start: 2024-06-10

## 2024-06-10 RX ORDER — SODIUM CHLORIDE 0.9 % (FLUSH) 0.9 %
5-40 SYRINGE (ML) INJECTION EVERY 12 HOURS SCHEDULED
Status: CANCELLED | OUTPATIENT
Start: 2024-06-10

## 2024-06-10 RX ORDER — ONDANSETRON 2 MG/ML
4 INJECTION INTRAMUSCULAR; INTRAVENOUS
Status: CANCELLED | OUTPATIENT
Start: 2024-06-10 | End: 2024-06-11

## 2024-06-10 RX ORDER — DIPHENHYDRAMINE HYDROCHLORIDE 50 MG/ML
12.5 INJECTION INTRAMUSCULAR; INTRAVENOUS
Status: CANCELLED | OUTPATIENT
Start: 2024-06-10 | End: 2024-06-11

## 2024-06-10 RX ORDER — NALOXONE HYDROCHLORIDE 0.4 MG/ML
INJECTION, SOLUTION INTRAMUSCULAR; INTRAVENOUS; SUBCUTANEOUS PRN
Status: CANCELLED | OUTPATIENT
Start: 2024-06-10

## 2024-06-10 RX ORDER — MEPERIDINE HYDROCHLORIDE 25 MG/ML
12.5 INJECTION INTRAMUSCULAR; INTRAVENOUS; SUBCUTANEOUS EVERY 5 MIN PRN
Status: CANCELLED | OUTPATIENT
Start: 2024-06-10

## 2024-06-10 NOTE — TELEPHONE ENCOUNTER
Cardiac Surgery Care Coordinator-  Called Blanche Mars to review plan of care and day of surgery expectations.  Encouraged Blanche Mars to verbalize and offered emotional support.  Blanche Mars is without questions or concerns at this time.

## 2024-06-11 ENCOUNTER — ANESTHESIA EVENT (OUTPATIENT)
Facility: HOSPITAL | Age: 69
End: 2024-06-11
Payer: MEDICARE

## 2024-06-11 ENCOUNTER — HOSPITAL ENCOUNTER (INPATIENT)
Facility: HOSPITAL | Age: 69
LOS: 9 days | Discharge: HOME HEALTH | DRG: 220 | End: 2024-06-20
Attending: THORACIC SURGERY (CARDIOTHORACIC VASCULAR SURGERY) | Admitting: THORACIC SURGERY (CARDIOTHORACIC VASCULAR SURGERY)
Payer: MEDICARE

## 2024-06-11 ENCOUNTER — HOSPITAL ENCOUNTER (OUTPATIENT)
Facility: HOSPITAL | Age: 69
Discharge: HOME | End: 2024-06-13
Attending: THORACIC SURGERY (CARDIOTHORACIC VASCULAR SURGERY)

## 2024-06-11 ENCOUNTER — APPOINTMENT (OUTPATIENT)
Facility: HOSPITAL | Age: 69
DRG: 220 | End: 2024-06-11
Attending: THORACIC SURGERY (CARDIOTHORACIC VASCULAR SURGERY)
Payer: MEDICARE

## 2024-06-11 ENCOUNTER — ANESTHESIA (OUTPATIENT)
Facility: HOSPITAL | Age: 69
End: 2024-06-11
Payer: MEDICARE

## 2024-06-11 DIAGNOSIS — I44.2 COMPLETE HEART BLOCK (HCC): ICD-10-CM

## 2024-06-11 DIAGNOSIS — I35.0 AORTIC STENOSIS, SEVERE: ICD-10-CM

## 2024-06-11 DIAGNOSIS — I34.0 MITRAL VALVE INSUFFICIENCY, UNSPECIFIED ETIOLOGY: ICD-10-CM

## 2024-06-11 DIAGNOSIS — Z95.2 S/P MVR (MITRAL VALVE REPLACEMENT): ICD-10-CM

## 2024-06-11 DIAGNOSIS — I34.0 NONRHEUMATIC MITRAL VALVE REGURGITATION: Primary | ICD-10-CM

## 2024-06-11 DIAGNOSIS — Z98.890 S/P VENTRICULAR SEPTAL MYECTOMY: ICD-10-CM

## 2024-06-11 DIAGNOSIS — Z98.890 S/P LEFT ATRIAL APPENDAGE LIGATION: ICD-10-CM

## 2024-06-11 LAB
ABO + RH BLD: NORMAL
ALBUMIN SERPL-MCNC: 3.3 G/DL (ref 3.5–5)
ALBUMIN/GLOB SERPL: 1.5 (ref 1.1–2.2)
ALP SERPL-CCNC: 29 U/L (ref 45–117)
ALT SERPL-CCNC: 22 U/L (ref 12–78)
ANION GAP BLD CALC-SCNC: 10 (ref 10–20)
ANION GAP BLD CALC-SCNC: 10 (ref 10–20)
ANION GAP BLD CALC-SCNC: 11 (ref 10–20)
ANION GAP BLD CALC-SCNC: 12 (ref 10–20)
ANION GAP BLD CALC-SCNC: 13 (ref 10–20)
ANION GAP BLD CALC-SCNC: 14 (ref 10–20)
ANION GAP BLD CALC-SCNC: 6 (ref 10–20)
ANION GAP BLD CALC-SCNC: 6 (ref 10–20)
ANION GAP BLD CALC-SCNC: 7 (ref 10–20)
ANION GAP BLD CALC-SCNC: 8 (ref 10–20)
ANION GAP BLD CALC-SCNC: 9 (ref 10–20)
ANION GAP BLD CALC-SCNC: ABNORMAL (ref 10–20)
ANION GAP SERPL CALC-SCNC: 7 MMOL/L (ref 5–15)
APTT PPP: 29.8 SEC (ref 22.1–31)
AST SERPL-CCNC: 70 U/L (ref 15–37)
BASE DEFICIT BLD-SCNC: 0.4 MMOL/L
BASE DEFICIT BLD-SCNC: 1.5 MMOL/L
BASE DEFICIT BLD-SCNC: 1.7 MMOL/L
BASE DEFICIT BLD-SCNC: 2.4 MMOL/L
BASE EXCESS BLD CALC-SCNC: 0 MMOL/L
BASE EXCESS BLD CALC-SCNC: 0.3 MMOL/L
BASE EXCESS BLD CALC-SCNC: 0.6 MMOL/L
BASE EXCESS BLD CALC-SCNC: 0.7 MMOL/L
BASE EXCESS BLD CALC-SCNC: 0.8 MMOL/L
BASE EXCESS BLD CALC-SCNC: 0.9 MMOL/L
BASE EXCESS BLD CALC-SCNC: 1.2 MMOL/L
BASE EXCESS BLD CALC-SCNC: 1.3 MMOL/L
BASE EXCESS BLD CALC-SCNC: 1.5 MMOL/L
BASE EXCESS BLD CALC-SCNC: 1.8 MMOL/L
BASE EXCESS BLD CALC-SCNC: 1.9 MMOL/L
BDY SITE: ABNORMAL
BILIRUB SERPL-MCNC: 1.6 MG/DL (ref 0.2–1)
BLD PROD TYP BPU: NORMAL
BLD PROD TYP BPU: NORMAL
BLOOD BANK DISPENSE STATUS: NORMAL
BLOOD BANK DISPENSE STATUS: NORMAL
BLOOD GROUP ANTIBODIES SERPL: NORMAL
BPU ID: NORMAL
BPU ID: NORMAL
BUN SERPL-MCNC: 20 MG/DL (ref 6–20)
BUN/CREAT SERPL: 26 (ref 12–20)
CA-I BLD-MCNC: 0.99 MMOL/L (ref 1.12–1.32)
CA-I BLD-MCNC: 1.01 MMOL/L (ref 1.12–1.32)
CA-I BLD-MCNC: 1.02 MMOL/L (ref 1.12–1.32)
CA-I BLD-MCNC: 1.04 MMOL/L (ref 1.12–1.32)
CA-I BLD-MCNC: 1.05 MMOL/L (ref 1.12–1.32)
CA-I BLD-MCNC: 1.06 MMOL/L (ref 1.12–1.32)
CA-I BLD-MCNC: 1.08 MMOL/L (ref 1.12–1.32)
CA-I BLD-MCNC: 1.09 MMOL/L (ref 1.12–1.32)
CA-I BLD-MCNC: 1.09 MMOL/L (ref 1.12–1.32)
CA-I BLD-MCNC: 1.15 MMOL/L (ref 1.12–1.32)
CA-I BLD-MCNC: 1.17 MMOL/L (ref 1.12–1.32)
CA-I BLD-MCNC: 1.18 MMOL/L (ref 1.12–1.32)
CA-I BLD-MCNC: 1.22 MMOL/L (ref 1.12–1.32)
CA-I BLD-MCNC: 1.25 MMOL/L (ref 1.12–1.32)
CA-I BLD-SCNC: 1.13 MMOL/L (ref 1.12–1.32)
CALCIUM SERPL-MCNC: 8.1 MG/DL (ref 8.5–10.1)
CHLORIDE BLD-SCNC: 103 MMOL/L (ref 100–108)
CHLORIDE BLD-SCNC: 103 MMOL/L (ref 100–108)
CHLORIDE BLD-SCNC: 105 MMOL/L (ref 100–108)
CHLORIDE BLD-SCNC: 106 MMOL/L (ref 100–108)
CHLORIDE BLD-SCNC: 107 MMOL/L (ref 100–108)
CHLORIDE BLD-SCNC: 110 MMOL/L (ref 100–108)
CHLORIDE BLD-SCNC: 111 MMOL/L (ref 100–108)
CHLORIDE BLD-SCNC: 114 MMOL/L (ref 100–108)
CHLORIDE SERPL-SCNC: 115 MMOL/L (ref 97–108)
CO2 BLD-SCNC: 23 MMOL/L (ref 19–24)
CO2 BLD-SCNC: 24 MMOL/L (ref 19–24)
CO2 BLD-SCNC: 25 MMOL/L (ref 19–24)
CO2 BLD-SCNC: 26 MMOL/L (ref 19–24)
CO2 BLD-SCNC: 27 MMOL/L (ref 19–24)
CO2 BLD-SCNC: 27 MMOL/L (ref 19–24)
CO2 SERPL-SCNC: 23 MMOL/L (ref 21–32)
CREAT SERPL-MCNC: 0.77 MG/DL (ref 0.55–1.02)
CREAT UR-MCNC: 0.6 MG/DL (ref 0.6–1.3)
CREAT UR-MCNC: 0.6 MG/DL (ref 0.6–1.3)
CREAT UR-MCNC: 0.7 MG/DL (ref 0.6–1.3)
CREAT UR-MCNC: 0.8 MG/DL (ref 0.6–1.3)
CROSSMATCH RESULT: NORMAL
CROSSMATCH RESULT: NORMAL
ECHO BSA: 2.16 M2
ERYTHROCYTE [DISTWIDTH] IN BLOOD BY AUTOMATED COUNT: 12.9 % (ref 11.5–14.5)
ERYTHROCYTE [DISTWIDTH] IN BLOOD BY AUTOMATED COUNT: 13 % (ref 11.5–14.5)
GLOBULIN SER CALC-MCNC: 2.2 G/DL (ref 2–4)
GLUCOSE BLD STRIP.AUTO-MCNC: 100 MG/DL (ref 74–99)
GLUCOSE BLD STRIP.AUTO-MCNC: 107 MG/DL (ref 74–99)
GLUCOSE BLD STRIP.AUTO-MCNC: 120 MG/DL (ref 74–99)
GLUCOSE BLD STRIP.AUTO-MCNC: 143 MG/DL (ref 65–117)
GLUCOSE BLD STRIP.AUTO-MCNC: 143 MG/DL (ref 74–99)
GLUCOSE BLD STRIP.AUTO-MCNC: 152 MG/DL (ref 65–117)
GLUCOSE BLD STRIP.AUTO-MCNC: 154 MG/DL (ref 65–117)
GLUCOSE BLD STRIP.AUTO-MCNC: 154 MG/DL (ref 74–99)
GLUCOSE BLD STRIP.AUTO-MCNC: 161 MG/DL (ref 74–99)
GLUCOSE BLD STRIP.AUTO-MCNC: 163 MG/DL (ref 74–99)
GLUCOSE BLD STRIP.AUTO-MCNC: 167 MG/DL (ref 74–99)
GLUCOSE BLD STRIP.AUTO-MCNC: 169 MG/DL (ref 65–117)
GLUCOSE BLD STRIP.AUTO-MCNC: 173 MG/DL (ref 65–117)
GLUCOSE BLD STRIP.AUTO-MCNC: 173 MG/DL (ref 74–99)
GLUCOSE BLD STRIP.AUTO-MCNC: 179 MG/DL (ref 74–99)
GLUCOSE BLD STRIP.AUTO-MCNC: 186 MG/DL (ref 74–99)
GLUCOSE BLD STRIP.AUTO-MCNC: 189 MG/DL (ref 74–99)
GLUCOSE BLD STRIP.AUTO-MCNC: 197 MG/DL (ref 74–99)
GLUCOSE BLD STRIP.AUTO-MCNC: 200 MG/DL (ref 74–99)
GLUCOSE SERPL-MCNC: 157 MG/DL (ref 65–100)
HCO3 BLD-SCNC: 22.8 MMOL/L (ref 22–26)
HCO3 BLDA-SCNC: 23 MMOL/L
HCO3 BLDA-SCNC: 24 MMOL/L
HCO3 BLDA-SCNC: 25 MMOL/L
HCO3 BLDA-SCNC: 26 MMOL/L
HCO3 BLDA-SCNC: 27 MMOL/L
HCT VFR BLD AUTO: 32.6 % (ref 35–47)
HCT VFR BLD AUTO: 32.7 % (ref 35–47)
HGB BLD-MCNC: 11.2 G/DL (ref 11.5–16)
HGB BLD-MCNC: 11.4 G/DL (ref 11.5–16)
INR PPP: 1.3 (ref 0.9–1.1)
LACTATE BLD-SCNC: 0.43 MMOL/L (ref 0.4–2)
LACTATE BLD-SCNC: 0.45 MMOL/L (ref 0.4–2)
LACTATE BLD-SCNC: 0.97 MMOL/L (ref 0.4–2)
LACTATE BLD-SCNC: 1 MMOL/L (ref 0.4–2)
LACTATE BLD-SCNC: 1.28 MMOL/L (ref 0.4–2)
LACTATE BLD-SCNC: <0.4 MMOL/L (ref 0.4–2)
MAGNESIUM SERPL-MCNC: 3.1 MG/DL (ref 1.6–2.4)
MCH RBC QN AUTO: 33.5 PG (ref 26–34)
MCH RBC QN AUTO: 34 PG (ref 26–34)
MCHC RBC AUTO-ENTMCNC: 34.4 G/DL (ref 30–36.5)
MCHC RBC AUTO-ENTMCNC: 34.9 G/DL (ref 30–36.5)
MCV RBC AUTO: 97.6 FL (ref 80–99)
MCV RBC AUTO: 97.6 FL (ref 80–99)
NRBC # BLD: 0 K/UL (ref 0–0.01)
NRBC # BLD: 0 K/UL (ref 0–0.01)
NRBC BLD-RTO: 0 PER 100 WBC
NRBC BLD-RTO: 0 PER 100 WBC
PCO2 BLD: 36.9 MMHG (ref 35–45)
PCO2 BLD: 38.4 MMHG (ref 35–45)
PCO2 BLD: 39 MMHG (ref 35–45)
PCO2 BLD: 39.3 MMHG (ref 35–45)
PCO2 BLD: 39.7 MMHG (ref 35–45)
PCO2 BLD: 39.9 MMHG (ref 35–45)
PCO2 BLD: 41.4 MMHG (ref 35–45)
PCO2 BLD: 43 MMHG (ref 35–45)
PCO2 BLD: 43.9 MMHG (ref 35–45)
PCO2 BLD: 44.4 MMHG (ref 35–45)
PCO2 BLD: 44.4 MMHG (ref 35–45)
PCO2 BLD: 44.9 MMHG (ref 35–45)
PCO2 BLD: 46.4 MMHG (ref 35–45)
PCO2 BLD: 46.7 MMHG (ref 35–45)
PCO2 BLD: 47.3 MMHG (ref 35–45)
PH BLD: 7.35 (ref 7.35–7.45)
PH BLD: 7.36 (ref 7.35–7.45)
PH BLD: 7.36 (ref 7.35–7.45)
PH BLD: 7.37 (ref 7.35–7.45)
PH BLD: 7.38 (ref 7.35–7.45)
PH BLD: 7.38 (ref 7.35–7.45)
PH BLD: 7.39 (ref 7.35–7.45)
PH BLD: 7.41 (ref 7.35–7.45)
PH BLD: 7.42 (ref 7.35–7.45)
PH BLD: 7.42 (ref 7.35–7.45)
PH BLD: 7.43 (ref 7.35–7.45)
PH BLD: 7.45 (ref 7.35–7.45)
PLATELET # BLD AUTO: 83 K/UL (ref 150–400)
PLATELET # BLD AUTO: 84 K/UL (ref 150–400)
PMV BLD AUTO: 10.3 FL (ref 8.9–12.9)
PMV BLD AUTO: 10.3 FL (ref 8.9–12.9)
PO2 BLD: 146 MMHG (ref 80–100)
PO2 BLD: 205 MMHG (ref 80–100)
PO2 BLD: 269 MMHG (ref 80–100)
PO2 BLD: 311 MMHG (ref 80–100)
PO2 BLD: 346 MMHG (ref 80–100)
PO2 BLD: 374 MMHG (ref 80–100)
PO2 BLD: 416 MMHG (ref 80–100)
PO2 BLD: 417 MMHG (ref 80–100)
PO2 BLD: 422 MMHG (ref 80–100)
PO2 BLD: 443 MMHG (ref 80–100)
PO2 BLD: 468 MMHG (ref 80–100)
PO2 BLD: 507 MMHG (ref 80–100)
PO2 BLD: >515 MMHG (ref 80–100)
POTASSIUM BLD-SCNC: 3.4 MMOL/L (ref 3.5–5.5)
POTASSIUM BLD-SCNC: 3.6 MMOL/L (ref 3.5–5.5)
POTASSIUM BLD-SCNC: 3.6 MMOL/L (ref 3.5–5.5)
POTASSIUM BLD-SCNC: 3.7 MMOL/L (ref 3.5–5.5)
POTASSIUM BLD-SCNC: 4.3 MMOL/L (ref 3.5–5.5)
POTASSIUM BLD-SCNC: 4.5 MMOL/L (ref 3.5–5.5)
POTASSIUM BLD-SCNC: 4.6 MMOL/L (ref 3.5–5.5)
POTASSIUM BLD-SCNC: 4.8 MMOL/L (ref 3.5–5.5)
POTASSIUM BLD-SCNC: 4.9 MMOL/L (ref 3.5–5.5)
POTASSIUM BLD-SCNC: 5.2 MMOL/L (ref 3.5–5.5)
POTASSIUM SERPL-SCNC: 3.8 MMOL/L (ref 3.5–5.1)
PROT SERPL-MCNC: 5.5 G/DL (ref 6.4–8.2)
PROTHROMBIN TIME: 12.9 SEC (ref 9–11.1)
RBC # BLD AUTO: 3.34 M/UL (ref 3.8–5.2)
RBC # BLD AUTO: 3.35 M/UL (ref 3.8–5.2)
SAO2 % BLD: 100 %
SAO2 % BLD: 99 %
SAO2 % BLD: 99.7 % (ref 92–97)
SERVICE CMNT-IMP: ABNORMAL
SODIUM BLD-SCNC: 139 MMOL/L (ref 136–145)
SODIUM BLD-SCNC: 139 MMOL/L (ref 136–145)
SODIUM BLD-SCNC: 140 MMOL/L (ref 136–145)
SODIUM BLD-SCNC: 141 MMOL/L (ref 136–145)
SODIUM BLD-SCNC: 142 MMOL/L (ref 136–145)
SODIUM BLD-SCNC: 142 MMOL/L (ref 136–145)
SODIUM BLD-SCNC: 143 MMOL/L (ref 136–145)
SODIUM BLD-SCNC: 144 MMOL/L (ref 136–145)
SODIUM BLD-SCNC: 145 MMOL/L (ref 136–145)
SODIUM BLD-SCNC: 145 MMOL/L (ref 136–145)
SODIUM BLD-SCNC: 146 MMOL/L (ref 136–145)
SODIUM SERPL-SCNC: 145 MMOL/L (ref 136–145)
SPECIMEN EXP DATE BLD: NORMAL
SPECIMEN SITE: ABNORMAL
SPECIMEN TYPE: ABNORMAL
THERAPEUTIC RANGE: NORMAL SECS (ref 58–77)
UNIT DIVISION: 0
UNIT DIVISION: 0
WBC # BLD AUTO: 9.4 K/UL (ref 3.6–11)
WBC # BLD AUTO: 9.8 K/UL (ref 3.6–11)

## 2024-06-11 PROCEDURE — 85018 HEMOGLOBIN: CPT

## 2024-06-11 PROCEDURE — 2500000003 HC RX 250 WO HCPCS: Performed by: PHYSICIAN ASSISTANT

## 2024-06-11 PROCEDURE — 6360000002 HC RX W HCPCS: Performed by: ANESTHESIOLOGY

## 2024-06-11 PROCEDURE — 85027 COMPLETE CBC AUTOMATED: CPT

## 2024-06-11 PROCEDURE — 82803 BLOOD GASES ANY COMBINATION: CPT

## 2024-06-11 PROCEDURE — 82962 GLUCOSE BLOOD TEST: CPT

## 2024-06-11 PROCEDURE — 2580000003 HC RX 258: Performed by: ANESTHESIOLOGY

## 2024-06-11 PROCEDURE — 2720000010 HC SURG SUPPLY STERILE: Performed by: THORACIC SURGERY (CARDIOTHORACIC VASCULAR SURGERY)

## 2024-06-11 PROCEDURE — P9045 ALBUMIN (HUMAN), 5%, 250 ML: HCPCS | Performed by: NURSE ANESTHETIST, CERTIFIED REGISTERED

## 2024-06-11 PROCEDURE — 85730 THROMBOPLASTIN TIME PARTIAL: CPT

## 2024-06-11 PROCEDURE — 83735 ASSAY OF MAGNESIUM: CPT

## 2024-06-11 PROCEDURE — 33416 REVISE VENTRICLE MUSCLE: CPT | Performed by: PHYSICIAN ASSISTANT

## 2024-06-11 PROCEDURE — 2580000003 HC RX 258: Performed by: NURSE ANESTHETIST, CERTIFIED REGISTERED

## 2024-06-11 PROCEDURE — 2500000003 HC RX 250 WO HCPCS: Performed by: NURSE ANESTHETIST, CERTIFIED REGISTERED

## 2024-06-11 PROCEDURE — 6360000002 HC RX W HCPCS: Performed by: NURSE ANESTHETIST, CERTIFIED REGISTERED

## 2024-06-11 PROCEDURE — 2580000003 HC RX 258: Performed by: PHYSICIAN ASSISTANT

## 2024-06-11 PROCEDURE — 84132 ASSAY OF SERUM POTASSIUM: CPT

## 2024-06-11 PROCEDURE — 2580000003 HC RX 258: Performed by: NURSE PRACTITIONER

## 2024-06-11 PROCEDURE — 2709999900 HC NON-CHARGEABLE SUPPLY: Performed by: THORACIC SURGERY (CARDIOTHORACIC VASCULAR SURGERY)

## 2024-06-11 PROCEDURE — 3600000008 HC SURGERY OHS BASE: Performed by: THORACIC SURGERY (CARDIOTHORACIC VASCULAR SURGERY)

## 2024-06-11 PROCEDURE — 02H63JZ INSERTION OF PACEMAKER LEAD INTO RIGHT ATRIUM, PERCUTANEOUS APPROACH: ICD-10-PCS | Performed by: INTERNAL MEDICINE

## 2024-06-11 PROCEDURE — 02L70CK OCCLUSION OF LEFT ATRIAL APPENDAGE WITH EXTRALUMINAL DEVICE, OPEN APPROACH: ICD-10-PCS | Performed by: THORACIC SURGERY (CARDIOTHORACIC VASCULAR SURGERY)

## 2024-06-11 PROCEDURE — 88311 DECALCIFY TISSUE: CPT

## 2024-06-11 PROCEDURE — 02HK3JZ INSERTION OF PACEMAKER LEAD INTO RIGHT VENTRICLE, PERCUTANEOUS APPROACH: ICD-10-PCS | Performed by: INTERNAL MEDICINE

## 2024-06-11 PROCEDURE — 0JH606Z INSERTION OF PACEMAKER, DUAL CHAMBER INTO CHEST SUBCUTANEOUS TISSUE AND FASCIA, OPEN APPROACH: ICD-10-PCS | Performed by: INTERNAL MEDICINE

## 2024-06-11 PROCEDURE — 2000000000 HC ICU R&B

## 2024-06-11 PROCEDURE — 85610 PROTHROMBIN TIME: CPT

## 2024-06-11 PROCEDURE — 3700000000 HC ANESTHESIA ATTENDED CARE: Performed by: THORACIC SURGERY (CARDIOTHORACIC VASCULAR SURGERY)

## 2024-06-11 PROCEDURE — 3700000001 HC ADD 15 MINUTES (ANESTHESIA): Performed by: THORACIC SURGERY (CARDIOTHORACIC VASCULAR SURGERY)

## 2024-06-11 PROCEDURE — C1889 IMPLANT/INSERT DEVICE, NOC: HCPCS | Performed by: THORACIC SURGERY (CARDIOTHORACIC VASCULAR SURGERY)

## 2024-06-11 PROCEDURE — 6370000000 HC RX 637 (ALT 250 FOR IP): Performed by: PHYSICIAN ASSISTANT

## 2024-06-11 PROCEDURE — 80053 COMPREHEN METABOLIC PANEL: CPT

## 2024-06-11 PROCEDURE — 6370000000 HC RX 637 (ALT 250 FOR IP): Performed by: NURSE ANESTHETIST, CERTIFIED REGISTERED

## 2024-06-11 PROCEDURE — 33430 REPLACEMENT OF MITRAL VALVE: CPT | Performed by: PHYSICIAN ASSISTANT

## 2024-06-11 PROCEDURE — 3600000018 HC SURGERY OHS ADDTL 15MIN: Performed by: THORACIC SURGERY (CARDIOTHORACIC VASCULAR SURGERY)

## 2024-06-11 PROCEDURE — 71045 X-RAY EXAM CHEST 1 VIEW: CPT

## 2024-06-11 PROCEDURE — 3E0102A INTRODUCTION OF ANTI-INFECTIVE ENVELOPE INTO SUBCUTANEOUS TISSUE, OPEN APPROACH: ICD-10-PCS | Performed by: INTERNAL MEDICINE

## 2024-06-11 PROCEDURE — P9073 PLATELETS PHERESIS PATH REDU: HCPCS

## 2024-06-11 PROCEDURE — B246ZZ4 ULTRASONOGRAPHY OF RIGHT AND LEFT HEART, TRANSESOPHAGEAL: ICD-10-PCS | Performed by: ANESTHESIOLOGY

## 2024-06-11 PROCEDURE — 5A1221Z PERFORMANCE OF CARDIAC OUTPUT, CONTINUOUS: ICD-10-PCS | Performed by: THORACIC SURGERY (CARDIOTHORACIC VASCULAR SURGERY)

## 2024-06-11 PROCEDURE — 02RG08Z REPLACEMENT OF MITRAL VALVE WITH ZOOPLASTIC TISSUE, OPEN APPROACH: ICD-10-PCS | Performed by: THORACIC SURGERY (CARDIOTHORACIC VASCULAR SURGERY)

## 2024-06-11 PROCEDURE — 94002 VENT MGMT INPAT INIT DAY: CPT

## 2024-06-11 PROCEDURE — 84295 ASSAY OF SERUM SODIUM: CPT

## 2024-06-11 PROCEDURE — 88305 TISSUE EXAM BY PATHOLOGIST: CPT

## 2024-06-11 PROCEDURE — 6360000002 HC RX W HCPCS: Performed by: NURSE PRACTITIONER

## 2024-06-11 PROCEDURE — 6360000002 HC RX W HCPCS: Performed by: PHYSICIAN ASSISTANT

## 2024-06-11 PROCEDURE — 36415 COLL VENOUS BLD VENIPUNCTURE: CPT

## 2024-06-11 PROCEDURE — 82947 ASSAY GLUCOSE BLOOD QUANT: CPT

## 2024-06-11 PROCEDURE — C1729 CATH, DRAINAGE: HCPCS | Performed by: THORACIC SURGERY (CARDIOTHORACIC VASCULAR SURGERY)

## 2024-06-11 PROCEDURE — P9045 ALBUMIN (HUMAN), 5%, 250 ML: HCPCS | Performed by: PHYSICIAN ASSISTANT

## 2024-06-11 PROCEDURE — 02BM0ZZ EXCISION OF VENTRICULAR SEPTUM, OPEN APPROACH: ICD-10-PCS | Performed by: THORACIC SURGERY (CARDIOTHORACIC VASCULAR SURGERY)

## 2024-06-11 PROCEDURE — 82330 ASSAY OF CALCIUM: CPT

## 2024-06-11 PROCEDURE — 6370000000 HC RX 637 (ALT 250 FOR IP): Performed by: NURSE PRACTITIONER

## 2024-06-11 PROCEDURE — 30233R1 TRANSFUSION OF NONAUTOLOGOUS PLATELETS INTO PERIPHERAL VEIN, PERCUTANEOUS APPROACH: ICD-10-PCS | Performed by: THORACIC SURGERY (CARDIOTHORACIC VASCULAR SURGERY)

## 2024-06-11 DEVICE — IMPLANTABLE DEVICE: Type: IMPLANTABLE DEVICE | Site: MITRAL VALVE | Status: FUNCTIONAL

## 2024-06-11 DEVICE — DEVICE OCCL CLP L50MM SM FOOTPRINT 1 HND APPL SUTURELESS W/: Type: IMPLANTABLE DEVICE | Site: HEART | Status: FUNCTIONAL

## 2024-06-11 RX ORDER — ROSUVASTATIN CALCIUM 40 MG/1
40 TABLET, COATED ORAL EVERY EVENING
Status: DISCONTINUED | OUTPATIENT
Start: 2024-06-11 | End: 2024-06-20 | Stop reason: HOSPADM

## 2024-06-11 RX ORDER — CHLORHEXIDINE GLUCONATE ORAL RINSE 1.2 MG/ML
15 SOLUTION DENTAL 2 TIMES DAILY
Status: DISCONTINUED | OUTPATIENT
Start: 2024-06-11 | End: 2024-06-20 | Stop reason: HOSPADM

## 2024-06-11 RX ORDER — MIDAZOLAM HYDROCHLORIDE 2 MG/2ML
1 INJECTION, SOLUTION INTRAMUSCULAR; INTRAVENOUS
Status: DISCONTINUED | OUTPATIENT
Start: 2024-06-11 | End: 2024-06-14

## 2024-06-11 RX ORDER — FENTANYL CITRATE 50 UG/ML
50 INJECTION, SOLUTION INTRAMUSCULAR; INTRAVENOUS
Status: COMPLETED | OUTPATIENT
Start: 2024-06-11 | End: 2024-06-11

## 2024-06-11 RX ORDER — SODIUM CHLORIDE 0.9 % (FLUSH) 0.9 %
5-40 SYRINGE (ML) INJECTION PRN
Status: DISCONTINUED | OUTPATIENT
Start: 2024-06-11 | End: 2024-06-11 | Stop reason: HOSPADM

## 2024-06-11 RX ORDER — INSULIN LISPRO 100 [IU]/ML
1-20 INJECTION, SOLUTION INTRAVENOUS; SUBCUTANEOUS
Status: DISCONTINUED | OUTPATIENT
Start: 2024-06-11 | End: 2024-06-13

## 2024-06-11 RX ORDER — FAMOTIDINE 20 MG/1
20 TABLET, FILM COATED ORAL 2 TIMES DAILY
Status: DISCONTINUED | OUTPATIENT
Start: 2024-06-11 | End: 2024-06-14

## 2024-06-11 RX ORDER — ACETAMINOPHEN 500 MG
1000 TABLET ORAL ONCE
Status: COMPLETED | OUTPATIENT
Start: 2024-06-11 | End: 2024-06-11

## 2024-06-11 RX ORDER — LIDOCAINE 4 G/G
2 PATCH TOPICAL DAILY
Status: DISCONTINUED | OUTPATIENT
Start: 2024-06-11 | End: 2024-06-20 | Stop reason: HOSPADM

## 2024-06-11 RX ORDER — DEXMEDETOMIDINE HYDROCHLORIDE 4 UG/ML
.1-1.5 INJECTION, SOLUTION INTRAVENOUS CONTINUOUS
Status: DISCONTINUED | OUTPATIENT
Start: 2024-06-11 | End: 2024-06-14

## 2024-06-11 RX ORDER — DESMOPRESSIN ACETATE 4 UG/ML
INJECTION, SOLUTION INTRAVENOUS; SUBCUTANEOUS PRN
Status: DISCONTINUED | OUTPATIENT
Start: 2024-06-11 | End: 2024-06-11 | Stop reason: SDUPTHER

## 2024-06-11 RX ORDER — SODIUM CHLORIDE 9 MG/ML
INJECTION, SOLUTION INTRAVENOUS PRN
Status: DISCONTINUED | OUTPATIENT
Start: 2024-06-11 | End: 2024-06-11 | Stop reason: HOSPADM

## 2024-06-11 RX ORDER — BISACODYL 10 MG
10 SUPPOSITORY, RECTAL RECTAL DAILY PRN
Status: DISCONTINUED | OUTPATIENT
Start: 2024-06-11 | End: 2024-06-20 | Stop reason: HOSPADM

## 2024-06-11 RX ORDER — ROCURONIUM BROMIDE 10 MG/ML
INJECTION, SOLUTION INTRAVENOUS PRN
Status: DISCONTINUED | OUTPATIENT
Start: 2024-06-11 | End: 2024-06-11 | Stop reason: SDUPTHER

## 2024-06-11 RX ORDER — HYDROMORPHONE HYDROCHLORIDE 1 MG/ML
0.5 INJECTION, SOLUTION INTRAMUSCULAR; INTRAVENOUS; SUBCUTANEOUS EVERY 4 HOURS PRN
Status: DISCONTINUED | OUTPATIENT
Start: 2024-06-11 | End: 2024-06-14

## 2024-06-11 RX ORDER — DEXAMETHASONE SODIUM PHOSPHATE 4 MG/ML
INJECTION, SOLUTION INTRA-ARTICULAR; INTRALESIONAL; INTRAMUSCULAR; INTRAVENOUS; SOFT TISSUE PRN
Status: DISCONTINUED | OUTPATIENT
Start: 2024-06-11 | End: 2024-06-11 | Stop reason: SDUPTHER

## 2024-06-11 RX ORDER — MIDAZOLAM HYDROCHLORIDE 1 MG/ML
INJECTION INTRAMUSCULAR; INTRAVENOUS PRN
Status: DISCONTINUED | OUTPATIENT
Start: 2024-06-11 | End: 2024-06-11 | Stop reason: SDUPTHER

## 2024-06-11 RX ORDER — SODIUM CHLORIDE 9 MG/ML
INJECTION, SOLUTION INTRAVENOUS PRN
Status: DISCONTINUED | OUTPATIENT
Start: 2024-06-11 | End: 2024-06-14

## 2024-06-11 RX ORDER — SODIUM CHLORIDE 0.9 % (FLUSH) 0.9 %
5-40 SYRINGE (ML) INJECTION PRN
Status: DISCONTINUED | OUTPATIENT
Start: 2024-06-11 | End: 2024-06-20 | Stop reason: HOSPADM

## 2024-06-11 RX ORDER — SODIUM CHLORIDE 0.9 % (FLUSH) 0.9 %
5-40 SYRINGE (ML) INJECTION EVERY 12 HOURS SCHEDULED
Status: DISCONTINUED | OUTPATIENT
Start: 2024-06-11 | End: 2024-06-11 | Stop reason: HOSPADM

## 2024-06-11 RX ORDER — LANOLIN ALCOHOL/MO/W.PET/CERES
6 CREAM (GRAM) TOPICAL NIGHTLY PRN
Status: DISCONTINUED | OUTPATIENT
Start: 2024-06-11 | End: 2024-06-20 | Stop reason: HOSPADM

## 2024-06-11 RX ORDER — LIDOCAINE HYDROCHLORIDE 20 MG/ML
INJECTION, SOLUTION EPIDURAL; INFILTRATION; INTRACAUDAL; PERINEURAL PRN
Status: DISCONTINUED | OUTPATIENT
Start: 2024-06-11 | End: 2024-06-11 | Stop reason: SDUPTHER

## 2024-06-11 RX ORDER — HEPARIN SODIUM 10000 [USP'U]/ML
INJECTION, SOLUTION INTRAVENOUS; SUBCUTANEOUS PRN
Status: DISCONTINUED | OUTPATIENT
Start: 2024-06-11 | End: 2024-06-11 | Stop reason: ALTCHOICE

## 2024-06-11 RX ORDER — LIDOCAINE HYDROCHLORIDE 10 MG/ML
1 INJECTION, SOLUTION EPIDURAL; INFILTRATION; INTRACAUDAL; PERINEURAL
Status: DISCONTINUED | OUTPATIENT
Start: 2024-06-11 | End: 2024-06-11 | Stop reason: HOSPADM

## 2024-06-11 RX ORDER — PROTAMINE SULFATE 10 MG/ML
INJECTION, SOLUTION INTRAVENOUS PRN
Status: DISCONTINUED | OUTPATIENT
Start: 2024-06-11 | End: 2024-06-11 | Stop reason: SDUPTHER

## 2024-06-11 RX ORDER — POTASSIUM CHLORIDE 29.8 MG/ML
20 INJECTION INTRAVENOUS PRN
Status: DISCONTINUED | OUTPATIENT
Start: 2024-06-11 | End: 2024-06-20 | Stop reason: HOSPADM

## 2024-06-11 RX ORDER — ALBUMIN, HUMAN INJ 5% 5 %
12.5 SOLUTION INTRAVENOUS PRN
Status: COMPLETED | OUTPATIENT
Start: 2024-06-11 | End: 2024-06-12

## 2024-06-11 RX ORDER — FENTANYL CITRATE 50 UG/ML
25 INJECTION, SOLUTION INTRAMUSCULAR; INTRAVENOUS
Status: COMPLETED | OUTPATIENT
Start: 2024-06-11 | End: 2024-06-11

## 2024-06-11 RX ORDER — SODIUM CHLORIDE, SODIUM LACTATE, POTASSIUM CHLORIDE, CALCIUM CHLORIDE 600; 310; 30; 20 MG/100ML; MG/100ML; MG/100ML; MG/100ML
INJECTION, SOLUTION INTRAVENOUS CONTINUOUS
Status: DISCONTINUED | OUTPATIENT
Start: 2024-06-11 | End: 2024-06-11 | Stop reason: HOSPADM

## 2024-06-11 RX ORDER — DEXTROSE MONOHYDRATE 100 MG/ML
INJECTION, SOLUTION INTRAVENOUS CONTINUOUS PRN
Status: DISCONTINUED | OUTPATIENT
Start: 2024-06-11 | End: 2024-06-20 | Stop reason: HOSPADM

## 2024-06-11 RX ORDER — OXYCODONE HYDROCHLORIDE 5 MG/1
10 TABLET ORAL EVERY 4 HOURS PRN
Status: DISCONTINUED | OUTPATIENT
Start: 2024-06-11 | End: 2024-06-14

## 2024-06-11 RX ORDER — METHYLPREDNISOLONE SODIUM SUCCINATE 125 MG/2ML
125 INJECTION, POWDER, LYOPHILIZED, FOR SOLUTION INTRAMUSCULAR; INTRAVENOUS ONCE
Status: DISCONTINUED | OUTPATIENT
Start: 2024-06-11 | End: 2024-06-11 | Stop reason: SDUPTHER

## 2024-06-11 RX ORDER — MIDAZOLAM HYDROCHLORIDE 2 MG/2ML
2 INJECTION, SOLUTION INTRAMUSCULAR; INTRAVENOUS
Status: COMPLETED | OUTPATIENT
Start: 2024-06-11 | End: 2024-06-11

## 2024-06-11 RX ORDER — SODIUM CHLORIDE, SODIUM LACTATE, POTASSIUM CHLORIDE, CALCIUM CHLORIDE 600; 310; 30; 20 MG/100ML; MG/100ML; MG/100ML; MG/100ML
INJECTION, SOLUTION INTRAVENOUS CONTINUOUS PRN
Status: DISCONTINUED | OUTPATIENT
Start: 2024-06-11 | End: 2024-06-11 | Stop reason: SDUPTHER

## 2024-06-11 RX ORDER — DIPHENHYDRAMINE HCL 25 MG
25 CAPSULE ORAL NIGHTLY PRN
Status: DISCONTINUED | OUTPATIENT
Start: 2024-06-12 | End: 2024-06-20 | Stop reason: HOSPADM

## 2024-06-11 RX ORDER — ONDANSETRON 2 MG/ML
4 INJECTION INTRAMUSCULAR; INTRAVENOUS EVERY 4 HOURS PRN
Status: DISCONTINUED | OUTPATIENT
Start: 2024-06-11 | End: 2024-06-20 | Stop reason: HOSPADM

## 2024-06-11 RX ORDER — ACETAMINOPHEN 325 MG/1
975 TABLET ORAL EVERY 6 HOURS SCHEDULED
Status: DISCONTINUED | OUTPATIENT
Start: 2024-06-11 | End: 2024-06-20 | Stop reason: HOSPADM

## 2024-06-11 RX ORDER — INSULIN LISPRO 100 [IU]/ML
0-12 INJECTION, SOLUTION INTRAVENOUS; SUBCUTANEOUS
Status: DISCONTINUED | OUTPATIENT
Start: 2024-06-13 | End: 2024-06-14

## 2024-06-11 RX ORDER — ACETAMINOPHEN 325 MG/1
650 TABLET ORAL ONCE
Status: DISCONTINUED | OUTPATIENT
Start: 2024-06-11 | End: 2024-06-11 | Stop reason: HOSPADM

## 2024-06-11 RX ORDER — GABAPENTIN 300 MG/1
300 CAPSULE ORAL ONCE
Status: COMPLETED | OUTPATIENT
Start: 2024-06-11 | End: 2024-06-11

## 2024-06-11 RX ORDER — LANOLIN ALCOHOL/MO/W.PET/CERES
400 CREAM (GRAM) TOPICAL 2 TIMES DAILY
Status: DISCONTINUED | OUTPATIENT
Start: 2024-06-12 | End: 2024-06-14

## 2024-06-11 RX ORDER — SENNA AND DOCUSATE SODIUM 50; 8.6 MG/1; MG/1
1 TABLET, FILM COATED ORAL 2 TIMES DAILY
Status: DISCONTINUED | OUTPATIENT
Start: 2024-06-11 | End: 2024-06-20 | Stop reason: HOSPADM

## 2024-06-11 RX ORDER — HYDRALAZINE HYDROCHLORIDE 20 MG/ML
10 INJECTION INTRAMUSCULAR; INTRAVENOUS EVERY 6 HOURS PRN
Status: DISCONTINUED | OUTPATIENT
Start: 2024-06-11 | End: 2024-06-20 | Stop reason: HOSPADM

## 2024-06-11 RX ORDER — INSULIN LISPRO 100 [IU]/ML
0-6 INJECTION, SOLUTION INTRAVENOUS; SUBCUTANEOUS NIGHTLY
Status: DISCONTINUED | OUTPATIENT
Start: 2024-06-13 | End: 2024-06-14

## 2024-06-11 RX ORDER — OXYCODONE HYDROCHLORIDE 5 MG/1
5 TABLET ORAL EVERY 4 HOURS PRN
Status: DISCONTINUED | OUTPATIENT
Start: 2024-06-11 | End: 2024-06-20 | Stop reason: HOSPADM

## 2024-06-11 RX ORDER — SODIUM CHLORIDE 9 MG/ML
INJECTION, SOLUTION INTRAVENOUS CONTINUOUS
Status: DISCONTINUED | OUTPATIENT
Start: 2024-06-11 | End: 2024-06-11 | Stop reason: HOSPADM

## 2024-06-11 RX ORDER — IPRATROPIUM BROMIDE AND ALBUTEROL SULFATE 2.5; .5 MG/3ML; MG/3ML
1 SOLUTION RESPIRATORY (INHALATION) EVERY 4 HOURS PRN
Status: DISCONTINUED | OUTPATIENT
Start: 2024-06-11 | End: 2024-06-20 | Stop reason: HOSPADM

## 2024-06-11 RX ORDER — AMIODARONE HYDROCHLORIDE 200 MG/1
400 TABLET ORAL 2 TIMES DAILY
Status: DISCONTINUED | OUTPATIENT
Start: 2024-06-12 | End: 2024-06-12

## 2024-06-11 RX ORDER — SODIUM CHLORIDE 450 MG/100ML
INJECTION, SOLUTION INTRAVENOUS CONTINUOUS
Status: DISCONTINUED | OUTPATIENT
Start: 2024-06-11 | End: 2024-06-20 | Stop reason: HOSPADM

## 2024-06-11 RX ORDER — SODIUM CHLORIDE 0.9 % (FLUSH) 0.9 %
5-40 SYRINGE (ML) INJECTION EVERY 12 HOURS SCHEDULED
Status: DISCONTINUED | OUTPATIENT
Start: 2024-06-11 | End: 2024-06-20 | Stop reason: HOSPADM

## 2024-06-11 RX ORDER — GABAPENTIN 100 MG/1
200 CAPSULE ORAL 3 TIMES DAILY
Status: DISCONTINUED | OUTPATIENT
Start: 2024-06-11 | End: 2024-06-20 | Stop reason: HOSPADM

## 2024-06-11 RX ORDER — INSULIN GLARGINE 100 [IU]/ML
1-50 INJECTION, SOLUTION SUBCUTANEOUS
Status: COMPLETED | OUTPATIENT
Start: 2024-06-13 | End: 2024-06-13

## 2024-06-11 RX ORDER — ONDANSETRON 2 MG/ML
4 INJECTION INTRAMUSCULAR; INTRAVENOUS ONCE
Status: COMPLETED | OUTPATIENT
Start: 2024-06-11 | End: 2024-06-11

## 2024-06-11 RX ORDER — SODIUM CHLORIDE 9 MG/ML
INJECTION, SOLUTION INTRAVENOUS CONTINUOUS
Status: DISCONTINUED | OUTPATIENT
Start: 2024-06-11 | End: 2024-06-20 | Stop reason: HOSPADM

## 2024-06-11 RX ORDER — HEPARIN SODIUM 1000 [USP'U]/ML
INJECTION, SOLUTION INTRAVENOUS; SUBCUTANEOUS PRN
Status: DISCONTINUED | OUTPATIENT
Start: 2024-06-11 | End: 2024-06-11 | Stop reason: SDUPTHER

## 2024-06-11 RX ORDER — FENTANYL CITRATE 50 UG/ML
INJECTION, SOLUTION INTRAMUSCULAR; INTRAVENOUS PRN
Status: DISCONTINUED | OUTPATIENT
Start: 2024-06-11 | End: 2024-06-11 | Stop reason: SDUPTHER

## 2024-06-11 RX ORDER — ALBUMIN, HUMAN INJ 5% 5 %
SOLUTION INTRAVENOUS PRN
Status: DISCONTINUED | OUTPATIENT
Start: 2024-06-11 | End: 2024-06-11 | Stop reason: SDUPTHER

## 2024-06-11 RX ORDER — POLYETHYLENE GLYCOL 3350 17 G/17G
17 POWDER, FOR SOLUTION ORAL DAILY
Status: DISCONTINUED | OUTPATIENT
Start: 2024-06-11 | End: 2024-06-20 | Stop reason: HOSPADM

## 2024-06-11 RX ORDER — ASPIRIN 81 MG/1
81 TABLET ORAL DAILY
Status: DISCONTINUED | OUTPATIENT
Start: 2024-06-12 | End: 2024-06-20 | Stop reason: HOSPADM

## 2024-06-11 RX ORDER — MAGNESIUM SULFATE IN WATER 40 MG/ML
2000 INJECTION, SOLUTION INTRAVENOUS PRN
Status: DISCONTINUED | OUTPATIENT
Start: 2024-06-11 | End: 2024-06-20 | Stop reason: HOSPADM

## 2024-06-11 RX ADMIN — SODIUM CHLORIDE 1.66 UNITS/HR: 9 INJECTION, SOLUTION INTRAVENOUS at 10:58

## 2024-06-11 RX ADMIN — ROCURONIUM BROMIDE 100 MG: 10 INJECTION, SOLUTION INTRAVENOUS at 08:09

## 2024-06-11 RX ADMIN — PROTAMINE SULFATE 250 MG: 10 INJECTION, SOLUTION INTRAVENOUS at 14:53

## 2024-06-11 RX ADMIN — PROPOFOL 20 MG: 10 INJECTION, EMULSION INTRAVENOUS at 14:44

## 2024-06-11 RX ADMIN — Medication 40 MCG: at 14:46

## 2024-06-11 RX ADMIN — FENTANYL CITRATE 150 MCG: 50 INJECTION, SOLUTION INTRAMUSCULAR; INTRAVENOUS at 08:47

## 2024-06-11 RX ADMIN — ONDANSETRON 4 MG: 2 INJECTION INTRAMUSCULAR; INTRAVENOUS at 20:56

## 2024-06-11 RX ADMIN — ACETAMINOPHEN 975 MG: 325 TABLET ORAL at 18:47

## 2024-06-11 RX ADMIN — HEPARIN SODIUM 45000 UNITS: 1000 INJECTION, SOLUTION INTRAVENOUS; SUBCUTANEOUS at 09:05

## 2024-06-11 RX ADMIN — SODIUM CHLORIDE 2 MG/HR: 9 INJECTION, SOLUTION INTRAVENOUS at 14:42

## 2024-06-11 RX ADMIN — EPINEPHRINE 2 MCG/MIN: 1 INJECTION INTRAMUSCULAR; INTRAVENOUS; SUBCUTANEOUS at 14:05

## 2024-06-11 RX ADMIN — GABAPENTIN 300 MG: 300 CAPSULE ORAL at 06:38

## 2024-06-11 RX ADMIN — DESMOPRESSIN ACETATE 30 MCG: 4 INJECTION, SOLUTION INTRAVENOUS; SUBCUTANEOUS at 15:07

## 2024-06-11 RX ADMIN — ROCURONIUM BROMIDE 30 MG: 10 INJECTION, SOLUTION INTRAVENOUS at 13:35

## 2024-06-11 RX ADMIN — SODIUM CHLORIDE, PRESERVATIVE FREE 10 ML: 5 INJECTION INTRAVENOUS at 21:00

## 2024-06-11 RX ADMIN — WATER 125 MG: 1 INJECTION INTRAMUSCULAR; INTRAVENOUS; SUBCUTANEOUS at 22:46

## 2024-06-11 RX ADMIN — Medication 20 MCG: at 09:37

## 2024-06-11 RX ADMIN — WATER 2000 MG: 1 INJECTION INTRAMUSCULAR; INTRAVENOUS; SUBCUTANEOUS at 11:30

## 2024-06-11 RX ADMIN — MIDAZOLAM 3 MG: 1 INJECTION INTRAMUSCULAR; INTRAVENOUS at 09:46

## 2024-06-11 RX ADMIN — PHENYLEPHRINE HYDROCHLORIDE 40 MCG/MIN: 10 INJECTION INTRAVENOUS at 08:15

## 2024-06-11 RX ADMIN — ALBUMIN (HUMAN) 250 ML: 12.5 INJECTION, SOLUTION INTRAVENOUS at 16:05

## 2024-06-11 RX ADMIN — AMINOCAPROIC ACID 10 G/HR: 250 INJECTION, SOLUTION INTRAVENOUS at 08:47

## 2024-06-11 RX ADMIN — DEXAMETHASONE SODIUM PHOSPHATE 4 MG: 4 INJECTION, SOLUTION INTRAMUSCULAR; INTRAVENOUS at 17:05

## 2024-06-11 RX ADMIN — FENTANYL CITRATE 100 MCG: 50 INJECTION, SOLUTION INTRAMUSCULAR; INTRAVENOUS at 08:09

## 2024-06-11 RX ADMIN — HYDROMORPHONE HYDROCHLORIDE 0.5 MG: 1 INJECTION, SOLUTION INTRAMUSCULAR; INTRAVENOUS; SUBCUTANEOUS at 22:41

## 2024-06-11 RX ADMIN — WATER 2000 MG: 1 INJECTION INTRAMUSCULAR; INTRAVENOUS; SUBCUTANEOUS at 22:18

## 2024-06-11 RX ADMIN — FAMOTIDINE 20 MG: 10 INJECTION, SOLUTION INTRAVENOUS at 20:57

## 2024-06-11 RX ADMIN — SODIUM CHLORIDE, POTASSIUM CHLORIDE, SODIUM LACTATE AND CALCIUM CHLORIDE: 600; 310; 30; 20 INJECTION, SOLUTION INTRAVENOUS at 08:07

## 2024-06-11 RX ADMIN — SODIUM CHLORIDE: 4.5 INJECTION, SOLUTION INTRAVENOUS at 17:27

## 2024-06-11 RX ADMIN — SODIUM CHLORIDE: 9 INJECTION, SOLUTION INTRAVENOUS at 07:59

## 2024-06-11 RX ADMIN — MIDAZOLAM 2 MG: 1 INJECTION INTRAMUSCULAR; INTRAVENOUS at 08:02

## 2024-06-11 RX ADMIN — DEXMEDETOMIDINE HYDROCHLORIDE 0.8 MCG/KG/HR: 4 INJECTION, SOLUTION INTRAVENOUS at 17:30

## 2024-06-11 RX ADMIN — PROPOFOL 90 MG: 10 INJECTION, EMULSION INTRAVENOUS at 08:09

## 2024-06-11 RX ADMIN — DEXMEDETOMIDINE HYDROCHLORIDE 0.3 MCG/KG/HR: 100 INJECTION, SOLUTION, CONCENTRATE INTRAVENOUS at 08:07

## 2024-06-11 RX ADMIN — SODIUM CHLORIDE: 9 INJECTION, SOLUTION INTRAVENOUS at 17:28

## 2024-06-11 RX ADMIN — Medication 20 MCG: at 09:35

## 2024-06-11 RX ADMIN — MIDAZOLAM 4 MG: 1 INJECTION INTRAMUSCULAR; INTRAVENOUS at 07:10

## 2024-06-11 RX ADMIN — Medication 20 MCG: at 14:45

## 2024-06-11 RX ADMIN — ACETAMINOPHEN 1000 MG: 500 TABLET ORAL at 06:38

## 2024-06-11 RX ADMIN — PHENYLEPHRINE HYDROCHLORIDE 60 MCG/MIN: 10 INJECTION INTRAVENOUS at 18:36

## 2024-06-11 RX ADMIN — LIDOCAINE HYDROCHLORIDE 80 MG: 20 INJECTION, SOLUTION EPIDURAL; INFILTRATION; INTRACAUDAL; PERINEURAL at 08:09

## 2024-06-11 RX ADMIN — CHLORHEXIDINE GLUCONATE 15 ML: 1.2 RINSE ORAL at 22:17

## 2024-06-11 RX ADMIN — WATER 2000 MG: 1 INJECTION INTRAMUSCULAR; INTRAVENOUS; SUBCUTANEOUS at 14:30

## 2024-06-11 RX ADMIN — MUPIROCIN: 20 OINTMENT TOPICAL at 22:17

## 2024-06-11 RX ADMIN — ALBUMIN (HUMAN) 12.5 G: 12.5 INJECTION, SOLUTION INTRAVENOUS at 18:42

## 2024-06-11 RX ADMIN — POTASSIUM CHLORIDE 20 MEQ: 29.8 INJECTION, SOLUTION INTRAVENOUS at 18:44

## 2024-06-11 RX ADMIN — ROCURONIUM BROMIDE 30 MG: 10 INJECTION, SOLUTION INTRAVENOUS at 12:38

## 2024-06-11 RX ADMIN — FENTANYL CITRATE 50 MCG: 50 INJECTION INTRAMUSCULAR; INTRAVENOUS at 07:10

## 2024-06-11 RX ADMIN — ROCURONIUM BROMIDE 20 MG: 10 INJECTION, SOLUTION INTRAVENOUS at 09:19

## 2024-06-11 RX ADMIN — SODIUM CHLORIDE, POTASSIUM CHLORIDE, SODIUM LACTATE AND CALCIUM CHLORIDE: 600; 310; 30; 20 INJECTION, SOLUTION INTRAVENOUS at 06:47

## 2024-06-11 RX ADMIN — ALBUMIN (HUMAN) 12.5 G: 12.5 INJECTION, SOLUTION INTRAVENOUS at 18:27

## 2024-06-11 RX ADMIN — WATER 2000 MG: 1 INJECTION INTRAMUSCULAR; INTRAVENOUS; SUBCUTANEOUS at 08:30

## 2024-06-11 RX ADMIN — SUGAMMADEX 200 MG: 100 INJECTION, SOLUTION INTRAVENOUS at 17:07

## 2024-06-11 ASSESSMENT — PAIN SCALES - GENERAL
PAINLEVEL_OUTOF10: 0
PAINLEVEL_OUTOF10: 10

## 2024-06-11 ASSESSMENT — PULMONARY FUNCTION TESTS
PIF_VALUE: 20
PIF_VALUE: 18
PIF_VALUE: 12

## 2024-06-11 ASSESSMENT — PAIN - FUNCTIONAL ASSESSMENT: PAIN_FUNCTIONAL_ASSESSMENT: 0-10

## 2024-06-11 NOTE — INTERVAL H&P NOTE
Update History & Physical    The patient's History and Physical of July 10, 2024 was reviewed with the patient. I examined the patient. There was no change. The surgical site was confirmed by the patient and me.       Electronically signed by Emery Servin PA-C on 6/11/2024 at 7:27 AM

## 2024-06-11 NOTE — H&P
Cardiac Surgery   History and Physical    Subjective:      69 y.o. female with PMHx of moderate AS, mod to severe MR, HLD, chronic lymphedema that is referred to the [facility] by Dr. Wan for interventional evaluation of  MS, MR, AS .     Endorses SOB (Over the past few years; onset correlates with timing of COVID vax per patient), LE edema (chronic lymphedema), fatigue (Per daughter- sleepy in the afternoon), and palpitations (Occasionally has 'twinges\"). Denies CP, PND, orthopnea, syncope, and dizziness     PM/SH negative for CVA/TIA, difficulty swallowing, SAUL , thyroid disease, dysrhythmias, lung disease, prior PNA, previous thoracic surgery, trauma or radiation to chest wall, diabetes, kidney disease, liver disease, blood, blackness or tarriness of stool, frequent UTIs, PAD/PVD , vein stripping, issues with bleeding or blood clots, cancer within the past 5 years, hospitalizations for HF within the past year, and previous valve replacements or PPM.     Denies tobacco use, alcohol use, and drug use. Family history positive for CV disease (father had 3 heart attacks-  at 66) .      Functional status: Active- goes to [employer] three days a week for chair workouts, \"strength and balance\" and hi chi.       Cardiovascular Testing:      EKG, 11/10/23:        TTE, 23:    Interpretation Summary       Left Ventricle: Normal left ventricular systolic function with a visually estimated EF of 65 - 70%. Left ventricle size is normal. Moderate septal thickening. IVSd is 1.3 cm. Sigmoid septum. Normal wall motion. Abnormal diastolic function. The LVOT peak gradient at rest is 24 mmHg.   The LVOT peak gradient at valvalva is 47 mmHg.    Left Atrium: Left atrium is severely dilated. Left atrial volume index is severely increased (>48 mL/m2). LA Vol Index A/L is 55 mL/m2.    Aortic Valve: Trileaflet valve. Thickened cusp. Moderately calcified cusp. Mild to moderate regurgitation. Moderate stenosis

## 2024-06-11 NOTE — BRIEF OP NOTE
BRIEF OP NOTE  Pre-Op Diagnosis: Aortic stenosis [I35.0]  Mitral regurgitation [I34.0]    Post-Op Diagnosis: Aortic stenosis [I35.0]  Mitral regurgitation [I34.0]      Procedure: Procedure(s):  MITRAL VALVE REPLACEMENT WITH 25MM BIOPROSTHETIC VALVE AND EXTENSIVE DEBULKING USING ULTRASONIC ASPIRATION; SEPTAL MYECTOMY; LEFT ATRIAL APPENDAGE LIGATION WITH 50MM ATRICLIP; ECC; SHALONDA & EPIAORTIC U/S BY DR. SIMMS    Surgeon:  Dr. Paul MD    Assistant(s): Emery Servin PA-C    Anesthesia: General      Infusions:  Epi @ 0.5, Tonio, Precedex, Insulin     Estimated Blood Loss: 350 ml     Cell Saver: 1000 ml     Bypass Time: 293 min     Cross Clamp Time: 232 min    Specimens:   ID Type Source Tests Collected by Time Destination   1 : NATIVE MITRAL VALVE LEAFLETS Tissue Heart SURGICAL PATHOLOGY Saroj Miller MD 6/11/2024 1118    2 : Portion of septum Tissue Heart SURGICAL PATHOLOGY Saroj Miller MD 6/11/2024 1132        Drains and pacing wires: 2 sets of Ventricular Wires, 2 brice drains (2 Mediastinal)    Wires completed by: Emery Servin PA-C    Sternal incision closed by: Emery Servin PA-C    Findings: See full operative note.    Implants:   Implant Name Type Inv. Item Serial No.  Lot No. LRB No. Used Action   DEVICE OCCL CLP L50MM SM FOOTPRINT 1 HND APPL SUTURELESS W/ - SNA Cardiovascular occluders DEVICE OCCL CLP L50MM SM FOOTPRINT 1 HND APPL SUTURELESS W/ NA [employer] [id number] N/A 1 Implanted   VALVE MITRAL 25MM MITRIS RESILIA - [id number] Mitral valves VALVE MITRAL 25MM MITRIS RESILIA [id number] [employer]- NA N/A 1 Implanted

## 2024-06-11 NOTE — OP NOTE
Cardiothoracic Surgery Operative Note    Date of Dictation: 06/11/24    Date of Procedure: 06/11/24    Referring Physician: Carlie Pepper MD and Bakari Randall MD.    Preoperative Diagnosis:    Aortic Stenosis  Mitral Regurgitation  Septal Hypertrophy    Postoperative Diagnosis:    Same.    Procedure:    1. Tissue Mitral Valve Replacement #25 Linton Mitris.  2. Epiaortic Ultrasound.  3. Ligation of AMARA with 50 mm clip.  4. Septal myectomy via transaortic approach.    Surgeon: Saroj Miller MD, PhD, [facility], [facility].    Assistant Surgeon(s):  Bakari Valdes MD    Assistant(s): RUMA Gautam    The assistance of the PA was required due to the critical nature of the surgery.    Anesthesia: General endotracheal anesthesia and SHALONDA.    Anesthesiologist(s):  Monty Albright MD.    Findings:    The ascending aorta was found to have no atheromatous disease by epiaortic ultrasound examination.    Post-bypass LV systolic function was good.  Post-bypass RV systolic function was good.    There was an enormous calcific burden with severe mitral annular calcification.    The aortic valve was mildly sclerotic and not leaking badly. We elected to preserve this valve.    Estimated Blood Loss:  Documented in the anesthesia record    STS Data: Estimated Mortality Risk 15 %.    The risks, benefits and alternatives to surgery were discussed with the patient and they requested to proceed with surgery.  Sonia-operative antibiotics were given within the STS-recommended windows.    Operative Details:    The patient was placed supine on the operating table. Standard monitors and lines were placed, anesthesia was given and the patient was intubated. The patient was prepped and draped in a sterile manner. A pre-incision time-out was performed. A median sternotomy was performed using a scalpel on the skin and electrocautery down to the sternum. The sternum was divided in the midline using a vertical oscillating saw. Hemostasis was achieved.

## 2024-06-11 NOTE — CONSENT
Informed Consent for Blood Component Transfusion Note    I have discussed with the patient the rationale for blood component transfusion; its benefits in treating or preventing fatigue, organ damage, or death; and its risk which includes mild transfusion reactions, rare risk of blood borne infection, or more serious but rare reactions. I have discussed the alternatives to transfusion, including the risk and consequences of not receiving transfusion. The patient had an opportunity to ask questions and had agreed to proceed with transfusion of blood components.    Electronically signed by RADHA Paez NP on 6/10/24 at 8:23 PM EDT

## 2024-06-11 NOTE — PERIOP NOTE
Report called to TALIA WootenU RN.  Information given to include patient's name, age, allergies, height, weight, PMH, invasive lines, procedure, drains, anesthesia drips and pump time.

## 2024-06-11 NOTE — ANESTHESIA PRE PROCEDURE
Past Surgical History:        Procedure Laterality Date   • CARDIAC PROCEDURE N/A 01/18/2024    Left heart cath / coronary angiography performed by Willian Hogue MD at [facility] CARDIAC CATH LAB   • COLONOSCOPY     • JOINT REPLACEMENT Left 01/2020   • TONSILLECTOMY         Social History:    Social History     Tobacco Use   • Smoking status: Never   • Smokeless tobacco: Never   Substance Use Topics   • Alcohol use: Not Currently                                Counseling given: Not Answered      Vital Signs (Current):   Vitals:    06/11/24 0618   BP: (!) 146/81   Pulse: 78   Resp: 15   Temp: 98.1 °F (36.7 °C)   TempSrc: Oral   SpO2: 95%   Weight: 101.8 kg (224 lb 6.4 oz)   Height: 1.651 m (5' 5\")                                              BP Readings from Last 3 Encounters:   06/11/24 (!) 146/81   06/05/24 134/73   04/18/24 123/85       NPO Status: Time of last liquid consumption: 0430 (ERAS - [facility])                        Time of last solid consumption: 1800                        Date of last liquid consumption: 06/11/24                        Date of last solid food consumption: 06/10/24    BMI:   Wt Readings from Last 3 Encounters:   06/11/24 101.8 kg (224 lb 6.4 oz)   06/05/24 101.8 kg (224 lb 6.4 oz)   04/18/24 104.8 kg (231 lb)     Body mass index is 37.34 kg/m².    CBC:   Lab Results   Component Value Date/Time    WBC 4.7 06/05/2024 08:15 AM    RBC 4.37 06/05/2024 08:15 AM    HGB 14.5 06/05/2024 08:15 AM    HCT 42.6 06/05/2024 08:15 AM    MCV 97.5 06/05/2024 08:15 AM    RDW 12.7 06/05/2024 08:15 AM     06/05/2024 08:15 AM       CMP:   Lab Results   Component Value Date/Time     06/05/2024 08:15 AM    K 3.9 06/05/2024 08:15 AM     06/05/2024 08:15 AM    CO2 28 06/05/2024 08:15 AM    BUN 19 06/05/2024 08:15 AM    CREATININE 0.61 06/05/2024 08:15 AM    GFRAA >60 01/16/2020 02:55 AM    LABGLOM >90 06/05/2024 08:15 AM    LABGLOM >60 01/04/2024 09:37 AM    GLUCOSE 97 06/05/2024 08:15

## 2024-06-12 ENCOUNTER — APPOINTMENT (OUTPATIENT)
Facility: HOSPITAL | Age: 69
DRG: 220 | End: 2024-06-12
Attending: THORACIC SURGERY (CARDIOTHORACIC VASCULAR SURGERY)
Payer: MEDICARE

## 2024-06-12 DIAGNOSIS — Z95.4 S/P AORTIC VALVE ALLOGRAFT: Primary | ICD-10-CM

## 2024-06-12 LAB
ACUTE KIDNEY INJURY RISK NEPHROCHECK: 0.6 (ref 0–0.3)
ACUTE KIDNEY INJURY RISK NEPHROCHECK: 0.86 (ref 0–0.3)
ACUTE KIDNEY INJURY RISK NEPHROCHECK: 1.98 (ref 0–0.3)
ANION GAP SERPL CALC-SCNC: 4 MMOL/L (ref 5–15)
BLD PROD TYP BPU: NORMAL
BLOOD BANK BLOOD PRODUCT EXPIRATION DATE: NORMAL
BLOOD BANK DISPENSE STATUS: NORMAL
BLOOD BANK ISBT PRODUCT BLOOD TYPE: 6200
BLOOD BANK PRODUCT CODE: NORMAL
BLOOD BANK UNIT TYPE AND RH: NORMAL
BPU ID: NORMAL
BUN SERPL-MCNC: 24 MG/DL (ref 6–20)
BUN/CREAT SERPL: 35 (ref 12–20)
CALCIUM SERPL-MCNC: 9 MG/DL (ref 8.5–10.1)
CHLORIDE SERPL-SCNC: 117 MMOL/L (ref 97–108)
CO2 SERPL-SCNC: 24 MMOL/L (ref 21–32)
CREAT SERPL-MCNC: 0.68 MG/DL (ref 0.55–1.02)
CREAT SERPL-MCNC: 0.72 MG/DL (ref 0.55–1.02)
ERYTHROCYTE [DISTWIDTH] IN BLOOD BY AUTOMATED COUNT: 13.2 % (ref 11.5–14.5)
GLUCOSE BLD STRIP.AUTO-MCNC: 100 MG/DL (ref 65–117)
GLUCOSE BLD STRIP.AUTO-MCNC: 118 MG/DL (ref 65–117)
GLUCOSE BLD STRIP.AUTO-MCNC: 120 MG/DL (ref 65–117)
GLUCOSE BLD STRIP.AUTO-MCNC: 121 MG/DL (ref 65–117)
GLUCOSE BLD STRIP.AUTO-MCNC: 122 MG/DL (ref 65–117)
GLUCOSE BLD STRIP.AUTO-MCNC: 123 MG/DL (ref 65–117)
GLUCOSE BLD STRIP.AUTO-MCNC: 124 MG/DL (ref 65–117)
GLUCOSE BLD STRIP.AUTO-MCNC: 129 MG/DL (ref 65–117)
GLUCOSE BLD STRIP.AUTO-MCNC: 135 MG/DL (ref 65–117)
GLUCOSE BLD STRIP.AUTO-MCNC: 141 MG/DL (ref 65–117)
GLUCOSE BLD STRIP.AUTO-MCNC: 148 MG/DL (ref 65–117)
GLUCOSE BLD STRIP.AUTO-MCNC: 162 MG/DL (ref 65–117)
GLUCOSE BLD STRIP.AUTO-MCNC: 169 MG/DL (ref 65–117)
GLUCOSE BLD STRIP.AUTO-MCNC: 190 MG/DL (ref 65–117)
GLUCOSE BLD STRIP.AUTO-MCNC: 213 MG/DL (ref 65–117)
GLUCOSE BLD STRIP.AUTO-MCNC: 76 MG/DL (ref 65–117)
GLUCOSE BLD STRIP.AUTO-MCNC: 81 MG/DL (ref 65–117)
GLUCOSE BLD STRIP.AUTO-MCNC: 94 MG/DL (ref 65–117)
GLUCOSE SERPL-MCNC: 130 MG/DL (ref 65–100)
HCT VFR BLD AUTO: 31.5 % (ref 35–47)
HGB BLD-MCNC: 10.9 G/DL (ref 11.5–16)
LACTATE SERPL-SCNC: 1.1 MMOL/L (ref 0.4–2)
LACTATE SERPL-SCNC: 1.7 MMOL/L (ref 0.4–2)
MAGNESIUM SERPL-MCNC: 3.2 MG/DL (ref 1.6–2.4)
MCH RBC QN AUTO: 33.7 PG (ref 26–34)
MCHC RBC AUTO-ENTMCNC: 34.6 G/DL (ref 30–36.5)
MCV RBC AUTO: 97.5 FL (ref 80–99)
NRBC # BLD: 0 K/UL (ref 0–0.01)
NRBC BLD-RTO: 0 PER 100 WBC
PLATELET # BLD AUTO: 94 K/UL (ref 150–400)
PMV BLD AUTO: 10.8 FL (ref 8.9–12.9)
POTASSIUM SERPL-SCNC: 4.4 MMOL/L (ref 3.5–5.1)
RBC # BLD AUTO: 3.23 M/UL (ref 3.8–5.2)
SERVICE CMNT-IMP: ABNORMAL
SERVICE CMNT-IMP: NORMAL
SODIUM SERPL-SCNC: 145 MMOL/L (ref 136–145)
UNIT DIVISION: 0
UNIT ISSUE DATE/TIME: NORMAL
WBC # BLD AUTO: 9.3 K/UL (ref 3.6–11)

## 2024-06-12 PROCEDURE — P9047 ALBUMIN (HUMAN), 25%, 50ML: HCPCS

## 2024-06-12 PROCEDURE — 2580000003 HC RX 258: Performed by: PHYSICIAN ASSISTANT

## 2024-06-12 PROCEDURE — 80048 BASIC METABOLIC PNL TOTAL CA: CPT

## 2024-06-12 PROCEDURE — 94003 VENT MGMT INPAT SUBQ DAY: CPT

## 2024-06-12 PROCEDURE — 97535 SELF CARE MNGMENT TRAINING: CPT

## 2024-06-12 PROCEDURE — 83605 ASSAY OF LACTIC ACID: CPT

## 2024-06-12 PROCEDURE — P9045 ALBUMIN (HUMAN), 5%, 250 ML: HCPCS | Performed by: EMERGENCY MEDICINE

## 2024-06-12 PROCEDURE — P9045 ALBUMIN (HUMAN), 5%, 250 ML: HCPCS | Performed by: PHYSICIAN ASSISTANT

## 2024-06-12 PROCEDURE — 6370000000 HC RX 637 (ALT 250 FOR IP): Performed by: PHYSICIAN ASSISTANT

## 2024-06-12 PROCEDURE — 97165 OT EVAL LOW COMPLEX 30 MIN: CPT

## 2024-06-12 PROCEDURE — 97161 PT EVAL LOW COMPLEX 20 MIN: CPT

## 2024-06-12 PROCEDURE — 6360000002 HC RX W HCPCS: Performed by: PHYSICIAN ASSISTANT

## 2024-06-12 PROCEDURE — 6360000002 HC RX W HCPCS: Performed by: NURSE PRACTITIONER

## 2024-06-12 PROCEDURE — 83735 ASSAY OF MAGNESIUM: CPT

## 2024-06-12 PROCEDURE — 6370000000 HC RX 637 (ALT 250 FOR IP): Performed by: NURSE PRACTITIONER

## 2024-06-12 PROCEDURE — 71045 X-RAY EXAM CHEST 1 VIEW: CPT

## 2024-06-12 PROCEDURE — 6360000002 HC RX W HCPCS

## 2024-06-12 PROCEDURE — 97116 GAIT TRAINING THERAPY: CPT

## 2024-06-12 PROCEDURE — 2580000003 HC RX 258: Performed by: NURSE PRACTITIONER

## 2024-06-12 PROCEDURE — 6360000002 HC RX W HCPCS: Performed by: EMERGENCY MEDICINE

## 2024-06-12 PROCEDURE — 36415 COLL VENOUS BLD VENIPUNCTURE: CPT

## 2024-06-12 PROCEDURE — P9045 ALBUMIN (HUMAN), 5%, 250 ML: HCPCS | Performed by: NURSE PRACTITIONER

## 2024-06-12 PROCEDURE — 82962 GLUCOSE BLOOD TEST: CPT

## 2024-06-12 PROCEDURE — 2700000000 HC OXYGEN THERAPY PER DAY

## 2024-06-12 PROCEDURE — 94760 N-INVAS EAR/PLS OXIMETRY 1: CPT

## 2024-06-12 PROCEDURE — 2000000000 HC ICU R&B

## 2024-06-12 PROCEDURE — 85027 COMPLETE CBC AUTOMATED: CPT

## 2024-06-12 RX ORDER — CHOLECALCIFEROL (VITAMIN D3) 1250 MCG
1 CAPSULE ORAL DAILY
Status: DISCONTINUED | OUTPATIENT
Start: 2024-06-12 | End: 2024-06-13

## 2024-06-12 RX ORDER — ALBUMIN (HUMAN) 12.5 G/50ML
SOLUTION INTRAVENOUS
Status: COMPLETED
Start: 2024-06-12 | End: 2024-06-12

## 2024-06-12 RX ORDER — SODIUM CHLORIDE, SODIUM LACTATE, POTASSIUM CHLORIDE, AND CALCIUM CHLORIDE .6; .31; .03; .02 G/100ML; G/100ML; G/100ML; G/100ML
500 INJECTION, SOLUTION INTRAVENOUS ONCE
Status: COMPLETED | OUTPATIENT
Start: 2024-06-12 | End: 2024-06-12

## 2024-06-12 RX ORDER — ALBUMIN, HUMAN INJ 5% 5 %
12.5 SOLUTION INTRAVENOUS ONCE
Status: COMPLETED | OUTPATIENT
Start: 2024-06-12 | End: 2024-06-12

## 2024-06-12 RX ORDER — B-COMPLEX WITH VITAMIN C
1 TABLET ORAL DAILY
Status: DISCONTINUED | OUTPATIENT
Start: 2024-06-12 | End: 2024-06-20 | Stop reason: HOSPADM

## 2024-06-12 RX ORDER — ALBUMIN (HUMAN) 12.5 G/50ML
25 SOLUTION INTRAVENOUS ONCE
Status: COMPLETED | OUTPATIENT
Start: 2024-06-12 | End: 2024-06-12

## 2024-06-12 RX ORDER — SODIUM CHLORIDE, SODIUM LACTATE, POTASSIUM CHLORIDE, CALCIUM CHLORIDE 600; 310; 30; 20 MG/100ML; MG/100ML; MG/100ML; MG/100ML
INJECTION, SOLUTION INTRAVENOUS CONTINUOUS
Status: DISCONTINUED | OUTPATIENT
Start: 2024-06-12 | End: 2024-06-13

## 2024-06-12 RX ADMIN — SODIUM CHLORIDE, POTASSIUM CHLORIDE, SODIUM LACTATE AND CALCIUM CHLORIDE: 600; 310; 30; 20 INJECTION, SOLUTION INTRAVENOUS at 19:39

## 2024-06-12 RX ADMIN — POLYETHYLENE GLYCOL 3350 17 G: 17 POWDER, FOR SOLUTION ORAL at 08:29

## 2024-06-12 RX ADMIN — ALBUMIN (HUMAN) 25 G: 0.25 INJECTION, SOLUTION INTRAVENOUS at 13:15

## 2024-06-12 RX ADMIN — MUPIROCIN: 20 OINTMENT TOPICAL at 21:26

## 2024-06-12 RX ADMIN — OXYCODONE HYDROCHLORIDE 10 MG: 5 TABLET ORAL at 19:17

## 2024-06-12 RX ADMIN — CHLORHEXIDINE GLUCONATE 15 ML: 1.2 RINSE ORAL at 08:30

## 2024-06-12 RX ADMIN — FAMOTIDINE 20 MG: 20 TABLET, FILM COATED ORAL at 21:25

## 2024-06-12 RX ADMIN — Medication 400 MG: at 21:25

## 2024-06-12 RX ADMIN — WATER 2000 MG: 1 INJECTION INTRAMUSCULAR; INTRAVENOUS; SUBCUTANEOUS at 06:10

## 2024-06-12 RX ADMIN — ACETAMINOPHEN 975 MG: 325 TABLET ORAL at 19:04

## 2024-06-12 RX ADMIN — ACETAMINOPHEN 975 MG: 325 TABLET ORAL at 00:12

## 2024-06-12 RX ADMIN — OXYCODONE HYDROCHLORIDE 5 MG: 5 TABLET ORAL at 12:11

## 2024-06-12 RX ADMIN — SODIUM CHLORIDE 3.8 UNITS/HR: 9 INJECTION, SOLUTION INTRAVENOUS at 05:27

## 2024-06-12 RX ADMIN — SENNOSIDES AND DOCUSATE SODIUM 1 TABLET: 50; 8.6 TABLET ORAL at 21:26

## 2024-06-12 RX ADMIN — SODIUM CHLORIDE, PRESERVATIVE FREE 10 ML: 5 INJECTION INTRAVENOUS at 08:30

## 2024-06-12 RX ADMIN — OXYCODONE HYDROCHLORIDE 5 MG: 5 TABLET ORAL at 04:42

## 2024-06-12 RX ADMIN — SODIUM CHLORIDE, POTASSIUM CHLORIDE, SODIUM LACTATE AND CALCIUM CHLORIDE 500 ML: 600; 310; 30; 20 INJECTION, SOLUTION INTRAVENOUS at 10:19

## 2024-06-12 RX ADMIN — GABAPENTIN 200 MG: 100 CAPSULE ORAL at 08:30

## 2024-06-12 RX ADMIN — WATER 2000 MG: 1 INJECTION INTRAMUSCULAR; INTRAVENOUS; SUBCUTANEOUS at 13:30

## 2024-06-12 RX ADMIN — Medication 1 TABLET: at 12:11

## 2024-06-12 RX ADMIN — Medication 400 MG: at 08:29

## 2024-06-12 RX ADMIN — ALBUMIN (HUMAN) 12.5 G: 12.5 INJECTION, SOLUTION INTRAVENOUS at 18:16

## 2024-06-12 RX ADMIN — ROSUVASTATIN 40 MG: 40 TABLET, FILM COATED ORAL at 17:45

## 2024-06-12 RX ADMIN — ACETAMINOPHEN 975 MG: 325 TABLET ORAL at 12:11

## 2024-06-12 RX ADMIN — SODIUM CHLORIDE, PRESERVATIVE FREE 10 ML: 5 INJECTION INTRAVENOUS at 21:27

## 2024-06-12 RX ADMIN — GABAPENTIN 200 MG: 100 CAPSULE ORAL at 13:30

## 2024-06-12 RX ADMIN — SODIUM CHLORIDE: 4.5 INJECTION, SOLUTION INTRAVENOUS at 05:18

## 2024-06-12 RX ADMIN — MUPIROCIN: 20 OINTMENT TOPICAL at 08:30

## 2024-06-12 RX ADMIN — ALBUMIN (HUMAN) 12.5 G: 12.5 INJECTION, SOLUTION INTRAVENOUS at 12:14

## 2024-06-12 RX ADMIN — FAMOTIDINE 20 MG: 20 TABLET, FILM COATED ORAL at 08:28

## 2024-06-12 RX ADMIN — INSULIN LISPRO 1 UNITS: 100 INJECTION, SOLUTION INTRAVENOUS; SUBCUTANEOUS at 18:15

## 2024-06-12 RX ADMIN — WATER 2000 MG: 1 INJECTION INTRAMUSCULAR; INTRAVENOUS; SUBCUTANEOUS at 21:31

## 2024-06-12 RX ADMIN — Medication 6 MG: at 21:25

## 2024-06-12 RX ADMIN — ACETAMINOPHEN 975 MG: 325 TABLET ORAL at 06:20

## 2024-06-12 RX ADMIN — ASPIRIN 81 MG: 81 TABLET, COATED ORAL at 08:28

## 2024-06-12 RX ADMIN — SENNOSIDES AND DOCUSATE SODIUM 1 TABLET: 50; 8.6 TABLET ORAL at 08:29

## 2024-06-12 RX ADMIN — CHLORHEXIDINE GLUCONATE 15 ML: 1.2 RINSE ORAL at 21:26

## 2024-06-12 RX ADMIN — GABAPENTIN 200 MG: 100 CAPSULE ORAL at 21:26

## 2024-06-12 RX ADMIN — ALBUMIN (HUMAN) 25 G: 12.5 SOLUTION INTRAVENOUS at 13:15

## 2024-06-12 RX ADMIN — SODIUM CHLORIDE, POTASSIUM CHLORIDE, SODIUM LACTATE AND CALCIUM CHLORIDE: 600; 310; 30; 20 INJECTION, SOLUTION INTRAVENOUS at 13:20

## 2024-06-12 RX ADMIN — ALBUMIN (HUMAN) 12.5 G: 12.5 INJECTION, SOLUTION INTRAVENOUS at 04:42

## 2024-06-12 ASSESSMENT — PAIN SCALES - GENERAL
PAINLEVEL_OUTOF10: 0
PAINLEVEL_OUTOF10: 2
PAINLEVEL_OUTOF10: 0
PAINLEVEL_OUTOF10: 5
PAINLEVEL_OUTOF10: 0
PAINLEVEL_OUTOF10: 2
PAINLEVEL_OUTOF10: 4
PAINLEVEL_OUTOF10: 5
PAINLEVEL_OUTOF10: 1
PAINLEVEL_OUTOF10: 7
PAINLEVEL_OUTOF10: 2

## 2024-06-12 ASSESSMENT — PAIN DESCRIPTION - ORIENTATION
ORIENTATION: ANTERIOR
ORIENTATION: ANTERIOR

## 2024-06-12 ASSESSMENT — PAIN DESCRIPTION - LOCATION
LOCATION: CHEST
LOCATION: CHEST

## 2024-06-12 ASSESSMENT — PULMONARY FUNCTION TESTS: PIF_VALUE: 7.13

## 2024-06-12 ASSESSMENT — PAIN DESCRIPTION - DESCRIPTORS
DESCRIPTORS: ACHING;SHARP
DESCRIPTORS: PRESSURE

## 2024-06-12 NOTE — WOUND CARE
WOCN Note    Assessed clear sacral dressing.  Applied 6.11.24.    Day 1  Dressing intact with minor folding on the edge.        JOSE C Hilton RN ON  [facility]patient [facility]ound [facility]  Available on [facility]  Office [phone]

## 2024-06-12 NOTE — CARE COORDINATION
Care Management Initial Assessment       RUR: 12% - low  Readmission? No  1st IM letter given? Yes - 6/12/24  1st  letter given: No    CM spoke with patient at bedside re initial assessment and disposition plan. Patient advised she resides with daughter and son-in-law at address on file. She is agreeable to home health services. Hx of home health after knee surgery but cannot recall name of agency. Patient uses a cane with ambulation and mobility at baseline. She owns a RW as well. Prescriptions should be sent to [facility] on [address]     CM will continue to follow.       06/12/24 1440   Service Assessment   Patient Orientation Alert and Oriented   Cognition Alert   History Provided By Patient   Primary Caregiver Self   Support Systems Children   Patient's Healthcare Decision Maker is: Named in Scanned ACP Document   PCP Verified by CM Yes   Last Visit to PCP Within last 3 months   Prior Functional Level Independent in ADLs/IADLs   Current Functional Level Independent in ADLs/IADLs   Can patient return to prior living arrangement Yes   Ability to make needs known: Good   Family able to assist with home care needs: Yes   Would you like for me to discuss the discharge plan with any other family members/significant others, and if so, who? No   Financial Resources Medicare   Social/Functional History   Lives With Daughter;Family   Type of Home House   Home Equipment Cane;Walker - Rolling;Lift chair   Receives Help From Family   ADL Assistance Independent   Homemaking Assistance Independent   Homemaking Responsibilities No   Ambulation Assistance Independent   Transfer Assistance Independent   Active  Yes   Mode of Transportation Car   Occupation Retired   Discharge Planning   Type of Residence House   Living Arrangements Children   Current Services Prior To Admission None   Potential Assistance Needed Home Care   DME Ordered? No   Potential Assistance Purchasing Medications No   Type of Home Care Services

## 2024-06-13 ENCOUNTER — APPOINTMENT (OUTPATIENT)
Facility: HOSPITAL | Age: 69
DRG: 220 | End: 2024-06-13
Attending: THORACIC SURGERY (CARDIOTHORACIC VASCULAR SURGERY)
Payer: MEDICARE

## 2024-06-13 PROBLEM — I44.2 COMPLETE HEART BLOCK (HCC): Status: ACTIVE | Noted: 2024-06-13

## 2024-06-13 LAB
ACUTE KIDNEY INJURY RISK NEPHROCHECK: 0.9 (ref 0–0.3)
ACUTE KIDNEY INJURY RISK NEPHROCHECK: 3.6 (ref 0–0.3)
ALBUMIN SERPL-MCNC: 3.9 G/DL (ref 3.5–5)
ALBUMIN/GLOB SERPL: 1.8 (ref 1.1–2.2)
ALP SERPL-CCNC: 29 U/L (ref 45–117)
ALT SERPL-CCNC: 20 U/L (ref 12–78)
ANION GAP SERPL CALC-SCNC: 2 MMOL/L (ref 5–15)
ANION GAP SERPL CALC-SCNC: 2 MMOL/L (ref 5–15)
AST SERPL-CCNC: 77 U/L (ref 15–37)
BILIRUB SERPL-MCNC: 1.9 MG/DL (ref 0.2–1)
BUN SERPL-MCNC: 21 MG/DL (ref 6–20)
BUN SERPL-MCNC: 33 MG/DL (ref 6–20)
BUN/CREAT SERPL: 28 (ref 12–20)
BUN/CREAT SERPL: 50 (ref 12–20)
CALCIUM SERPL-MCNC: 8.4 MG/DL (ref 8.5–10.1)
CALCIUM SERPL-MCNC: 8.7 MG/DL (ref 8.5–10.1)
CHLORIDE SERPL-SCNC: 110 MMOL/L (ref 97–108)
CHLORIDE SERPL-SCNC: 117 MMOL/L (ref 97–108)
CO2 SERPL-SCNC: 25 MMOL/L (ref 21–32)
CO2 SERPL-SCNC: 26 MMOL/L (ref 21–32)
CREAT SERPL-MCNC: 0.66 MG/DL (ref 0.55–1.02)
CREAT SERPL-MCNC: 0.75 MG/DL (ref 0.55–1.02)
ECHO BSA: 2.24 M2
ECHO BSA: 2.24 M2
ECHO MV AREA PHT: 2.6 CM2
ECHO MV MAX VELOCITY: 1.9 M/S
ECHO MV MEAN GRADIENT: 4 MMHG
ECHO MV MEAN VELOCITY: 0.9 M/S
ECHO MV PEAK GRADIENT: 15 MMHG
ECHO MV PRESSURE HALF TIME (PHT): 84.3 MS
ECHO MV VTI: 39.8 CM
ERYTHROCYTE [DISTWIDTH] IN BLOOD BY AUTOMATED COUNT: 13.8 % (ref 11.5–14.5)
GLOBULIN SER CALC-MCNC: 2.2 G/DL (ref 2–4)
GLUCOSE BLD STRIP.AUTO-MCNC: 100 MG/DL (ref 65–117)
GLUCOSE BLD STRIP.AUTO-MCNC: 104 MG/DL (ref 65–117)
GLUCOSE BLD STRIP.AUTO-MCNC: 105 MG/DL (ref 65–117)
GLUCOSE BLD STRIP.AUTO-MCNC: 105 MG/DL (ref 65–117)
GLUCOSE BLD STRIP.AUTO-MCNC: 107 MG/DL (ref 65–117)
GLUCOSE BLD STRIP.AUTO-MCNC: 111 MG/DL (ref 65–117)
GLUCOSE BLD STRIP.AUTO-MCNC: 124 MG/DL (ref 65–117)
GLUCOSE BLD STRIP.AUTO-MCNC: 81 MG/DL (ref 65–117)
GLUCOSE BLD STRIP.AUTO-MCNC: 93 MG/DL (ref 65–117)
GLUCOSE BLD STRIP.AUTO-MCNC: 94 MG/DL (ref 65–117)
GLUCOSE BLD STRIP.AUTO-MCNC: 99 MG/DL (ref 65–117)
GLUCOSE SERPL-MCNC: 178 MG/DL (ref 65–100)
GLUCOSE SERPL-MCNC: 98 MG/DL (ref 65–100)
HCT VFR BLD AUTO: 28 % (ref 35–47)
HGB BLD-MCNC: 9.5 G/DL (ref 11.5–16)
LACTATE SERPL-SCNC: 1 MMOL/L (ref 0.4–2)
MAGNESIUM SERPL-MCNC: 2.8 MG/DL (ref 1.6–2.4)
MCH RBC QN AUTO: 34.1 PG (ref 26–34)
MCHC RBC AUTO-ENTMCNC: 33.9 G/DL (ref 30–36.5)
MCV RBC AUTO: 100.4 FL (ref 80–99)
NRBC # BLD: 0 K/UL (ref 0–0.01)
NRBC BLD-RTO: 0 PER 100 WBC
PLATELET # BLD AUTO: 73 K/UL (ref 150–400)
PMV BLD AUTO: 11.3 FL (ref 8.9–12.9)
POTASSIUM SERPL-SCNC: 4.5 MMOL/L (ref 3.5–5.1)
POTASSIUM SERPL-SCNC: 4.8 MMOL/L (ref 3.5–5.1)
PROT SERPL-MCNC: 6.1 G/DL (ref 6.4–8.2)
RBC # BLD AUTO: 2.79 M/UL (ref 3.8–5.2)
SERVICE CMNT-IMP: ABNORMAL
SERVICE CMNT-IMP: NORMAL
SODIUM SERPL-SCNC: 138 MMOL/L (ref 136–145)
SODIUM SERPL-SCNC: 144 MMOL/L (ref 136–145)
WBC # BLD AUTO: 12.9 K/UL (ref 3.6–11)

## 2024-06-13 PROCEDURE — 99223 1ST HOSP IP/OBS HIGH 75: CPT | Performed by: INTERNAL MEDICINE

## 2024-06-13 PROCEDURE — 93321 DOPPLER ECHO F-UP/LMTD STD: CPT | Performed by: INTERNAL MEDICINE

## 2024-06-13 PROCEDURE — 97535 SELF CARE MNGMENT TRAINING: CPT

## 2024-06-13 PROCEDURE — 33208 INSRT HEART PM ATRIAL & VENT: CPT | Performed by: INTERNAL MEDICINE

## 2024-06-13 PROCEDURE — 2000000000 HC ICU R&B

## 2024-06-13 PROCEDURE — 6360000002 HC RX W HCPCS: Performed by: PHYSICIAN ASSISTANT

## 2024-06-13 PROCEDURE — 2500000003 HC RX 250 WO HCPCS: Performed by: INTERNAL MEDICINE

## 2024-06-13 PROCEDURE — 99153 MOD SED SAME PHYS/QHP EA: CPT | Performed by: INTERNAL MEDICINE

## 2024-06-13 PROCEDURE — 71045 X-RAY EXAM CHEST 1 VIEW: CPT

## 2024-06-13 PROCEDURE — 2580000003 HC RX 258: Performed by: PHYSICIAN ASSISTANT

## 2024-06-13 PROCEDURE — 99152 MOD SED SAME PHYS/QHP 5/>YRS: CPT | Performed by: INTERNAL MEDICINE

## 2024-06-13 PROCEDURE — 2580000003 HC RX 258: Performed by: NURSE PRACTITIONER

## 2024-06-13 PROCEDURE — 93325 DOPPLER ECHO COLOR FLOW MAPG: CPT | Performed by: INTERNAL MEDICINE

## 2024-06-13 PROCEDURE — 6370000000 HC RX 637 (ALT 250 FOR IP): Performed by: EMERGENCY MEDICINE

## 2024-06-13 PROCEDURE — 83735 ASSAY OF MAGNESIUM: CPT

## 2024-06-13 PROCEDURE — 97530 THERAPEUTIC ACTIVITIES: CPT

## 2024-06-13 PROCEDURE — 6370000000 HC RX 637 (ALT 250 FOR IP): Performed by: THORACIC SURGERY (CARDIOTHORACIC VASCULAR SURGERY)

## 2024-06-13 PROCEDURE — C1785 PMKR, DUAL, RATE-RESP: HCPCS | Performed by: INTERNAL MEDICINE

## 2024-06-13 PROCEDURE — 6370000000 HC RX 637 (ALT 250 FOR IP): Performed by: NURSE PRACTITIONER

## 2024-06-13 PROCEDURE — 6360000002 HC RX W HCPCS: Performed by: NURSE PRACTITIONER

## 2024-06-13 PROCEDURE — 93308 TTE F-UP OR LMTD: CPT

## 2024-06-13 PROCEDURE — 36415 COLL VENOUS BLD VENIPUNCTURE: CPT

## 2024-06-13 PROCEDURE — 80048 BASIC METABOLIC PNL TOTAL CA: CPT

## 2024-06-13 PROCEDURE — 93308 TTE F-UP OR LMTD: CPT | Performed by: INTERNAL MEDICINE

## 2024-06-13 PROCEDURE — 85027 COMPLETE CBC AUTOMATED: CPT

## 2024-06-13 PROCEDURE — 83605 ASSAY OF LACTIC ACID: CPT

## 2024-06-13 PROCEDURE — 6370000000 HC RX 637 (ALT 250 FOR IP): Performed by: PHYSICIAN ASSISTANT

## 2024-06-13 PROCEDURE — 82962 GLUCOSE BLOOD TEST: CPT

## 2024-06-13 PROCEDURE — 6360000002 HC RX W HCPCS: Performed by: INTERNAL MEDICINE

## 2024-06-13 PROCEDURE — C1889 IMPLANT/INSERT DEVICE, NOC: HCPCS | Performed by: INTERNAL MEDICINE

## 2024-06-13 PROCEDURE — C1892 INTRO/SHEATH,FIXED,PEEL-AWAY: HCPCS | Performed by: INTERNAL MEDICINE

## 2024-06-13 PROCEDURE — 2709999900 HC NON-CHARGEABLE SUPPLY: Performed by: INTERNAL MEDICINE

## 2024-06-13 PROCEDURE — C1898 LEAD, PMKR, OTHER THAN TRANS: HCPCS | Performed by: INTERNAL MEDICINE

## 2024-06-13 DEVICE — LEAD 5076-58 MRI US RCMCRD
Type: IMPLANTABLE DEVICE | Status: FUNCTIONAL
Brand: CAPSUREFIX NOVUS MRI™ SURESCAN®

## 2024-06-13 DEVICE — LEAD 5076-52 MRI US RCMCRD
Type: IMPLANTABLE DEVICE | Status: FUNCTIONAL
Brand: CAPSUREFIX NOVUS MRI™ SURESCAN®

## 2024-06-13 DEVICE — IPG W1DR01 AZURE XT DR MRI USA
Type: IMPLANTABLE DEVICE | Status: FUNCTIONAL
Brand: AZURE™ XT DR MRI SURESCAN™

## 2024-06-13 DEVICE — ENVELOPE CMRM6133 ABSORB LRG MR
Type: IMPLANTABLE DEVICE | Status: FUNCTIONAL
Brand: TYRX™

## 2024-06-13 RX ORDER — MIDAZOLAM HYDROCHLORIDE 1 MG/ML
INJECTION INTRAMUSCULAR; INTRAVENOUS PRN
Status: DISCONTINUED | OUTPATIENT
Start: 2024-06-13 | End: 2024-06-13 | Stop reason: HOSPADM

## 2024-06-13 RX ORDER — MIDODRINE HYDROCHLORIDE 5 MG/1
10 TABLET ORAL
Status: DISCONTINUED | OUTPATIENT
Start: 2024-06-13 | End: 2024-06-14

## 2024-06-13 RX ORDER — MIDODRINE HYDROCHLORIDE 5 MG/1
10 TABLET ORAL
Status: DISCONTINUED | OUTPATIENT
Start: 2024-06-13 | End: 2024-06-13

## 2024-06-13 RX ORDER — FUROSEMIDE 10 MG/ML
20 INJECTION INTRAMUSCULAR; INTRAVENOUS ONCE
Status: COMPLETED | OUTPATIENT
Start: 2024-06-13 | End: 2024-06-13

## 2024-06-13 RX ORDER — FENTANYL CITRATE 50 UG/ML
INJECTION, SOLUTION INTRAMUSCULAR; INTRAVENOUS PRN
Status: DISCONTINUED | OUTPATIENT
Start: 2024-06-13 | End: 2024-06-13 | Stop reason: HOSPADM

## 2024-06-13 RX ORDER — PHENYLEPHRINE HYDROCHLORIDE 10 MG/ML
INJECTION INTRAVENOUS PRN
Status: DISCONTINUED | OUTPATIENT
Start: 2024-06-13 | End: 2024-06-13 | Stop reason: HOSPADM

## 2024-06-13 RX ORDER — GINSENG 100 MG
CAPSULE ORAL ONCE
Status: COMPLETED | OUTPATIENT
Start: 2024-06-13 | End: 2024-06-13

## 2024-06-13 RX ADMIN — SODIUM CHLORIDE, PRESERVATIVE FREE 10 ML: 5 INJECTION INTRAVENOUS at 20:41

## 2024-06-13 RX ADMIN — Medication 400 MG: at 20:35

## 2024-06-13 RX ADMIN — BACITRACIN 1 EACH: 500 OINTMENT TOPICAL at 11:42

## 2024-06-13 RX ADMIN — ACETAMINOPHEN 975 MG: 325 TABLET ORAL at 17:23

## 2024-06-13 RX ADMIN — MUPIROCIN: 20 OINTMENT TOPICAL at 20:41

## 2024-06-13 RX ADMIN — FAMOTIDINE 20 MG: 20 TABLET, FILM COATED ORAL at 08:33

## 2024-06-13 RX ADMIN — MUPIROCIN: 20 OINTMENT TOPICAL at 08:36

## 2024-06-13 RX ADMIN — GABAPENTIN 200 MG: 100 CAPSULE ORAL at 14:19

## 2024-06-13 RX ADMIN — SENNOSIDES AND DOCUSATE SODIUM 1 TABLET: 50; 8.6 TABLET ORAL at 20:35

## 2024-06-13 RX ADMIN — SENNOSIDES AND DOCUSATE SODIUM 1 TABLET: 50; 8.6 TABLET ORAL at 08:33

## 2024-06-13 RX ADMIN — PHENYLEPHRINE HYDROCHLORIDE 10 MCG/MIN: 10 INJECTION INTRAVENOUS at 08:26

## 2024-06-13 RX ADMIN — SODIUM CHLORIDE, PRESERVATIVE FREE 10 ML: 5 INJECTION INTRAVENOUS at 08:36

## 2024-06-13 RX ADMIN — ACETAMINOPHEN 975 MG: 325 TABLET ORAL at 06:16

## 2024-06-13 RX ADMIN — Medication 1 TABLET: at 08:33

## 2024-06-13 RX ADMIN — INSULIN GLARGINE 8 UNITS: 100 INJECTION, SOLUTION SUBCUTANEOUS at 13:01

## 2024-06-13 RX ADMIN — GABAPENTIN 200 MG: 100 CAPSULE ORAL at 08:33

## 2024-06-13 RX ADMIN — CHLORHEXIDINE GLUCONATE 15 ML: 1.2 RINSE ORAL at 20:41

## 2024-06-13 RX ADMIN — CHLORHEXIDINE GLUCONATE 15 ML: 1.2 RINSE ORAL at 08:36

## 2024-06-13 RX ADMIN — FAMOTIDINE 20 MG: 20 TABLET, FILM COATED ORAL at 20:35

## 2024-06-13 RX ADMIN — MIDODRINE HYDROCHLORIDE 10 MG: 5 TABLET ORAL at 17:24

## 2024-06-13 RX ADMIN — MIDODRINE HYDROCHLORIDE 10 MG: 5 TABLET ORAL at 09:26

## 2024-06-13 RX ADMIN — MAGNESIUM HYDROXIDE 30 ML: 400 SUSPENSION ORAL at 08:33

## 2024-06-13 RX ADMIN — WATER 2000 MG: 1 INJECTION INTRAMUSCULAR; INTRAVENOUS; SUBCUTANEOUS at 06:17

## 2024-06-13 RX ADMIN — Medication 400 MG: at 08:33

## 2024-06-13 RX ADMIN — FUROSEMIDE 20 MG: 10 INJECTION, SOLUTION INTRAMUSCULAR; INTRAVENOUS at 13:01

## 2024-06-13 RX ADMIN — ACETAMINOPHEN 975 MG: 325 TABLET ORAL at 11:27

## 2024-06-13 RX ADMIN — ROSUVASTATIN 40 MG: 40 TABLET, FILM COATED ORAL at 17:23

## 2024-06-13 RX ADMIN — SODIUM CHLORIDE 1.8 UNITS/HR: 9 INJECTION, SOLUTION INTRAVENOUS at 07:41

## 2024-06-13 RX ADMIN — OXYCODONE HYDROCHLORIDE 5 MG: 5 TABLET ORAL at 10:17

## 2024-06-13 RX ADMIN — OXYCODONE HYDROCHLORIDE 10 MG: 5 TABLET ORAL at 03:57

## 2024-06-13 RX ADMIN — SODIUM CHLORIDE, POTASSIUM CHLORIDE, SODIUM LACTATE AND CALCIUM CHLORIDE: 600; 310; 30; 20 INJECTION, SOLUTION INTRAVENOUS at 07:34

## 2024-06-13 RX ADMIN — GABAPENTIN 200 MG: 100 CAPSULE ORAL at 20:35

## 2024-06-13 ASSESSMENT — PAIN DESCRIPTION - LOCATION: LOCATION: CHEST

## 2024-06-13 ASSESSMENT — PAIN SCALES - GENERAL
PAINLEVEL_OUTOF10: 0
PAINLEVEL_OUTOF10: 0
PAINLEVEL_OUTOF10: 7
PAINLEVEL_OUTOF10: 0
PAINLEVEL_OUTOF10: 3
PAINLEVEL_OUTOF10: 5
PAINLEVEL_OUTOF10: 0

## 2024-06-13 ASSESSMENT — PAIN DESCRIPTION - DESCRIPTORS: DESCRIPTORS: ACHING

## 2024-06-13 ASSESSMENT — PAIN DESCRIPTION - ORIENTATION: ORIENTATION: ANTERIOR

## 2024-06-13 NOTE — DISCHARGE INSTRUCTIONS
Cardiac Surgery Specialist    [address]       [address]                             [place] [address]                       [facility] [address]  Office- [phone]  Fax- [phone]       Office- [phone]  Fax- [phone]  _____________________________________________________________  Dr. Bakari Meade,NP      Latasha Mitchell, PAGenetC  Dr. Carlos Echeverria,NP       Melina Rajput, NP  Dr. Phillip Calderón, MICHOACANO Servin, PAGenetC  Dr. Saroj Azar, PAPAYTON Villatoro, MICHOACANO Harley, NP                    Melissa Martinez, NP  _____________________________________________________________    Name:Blanche Mars     Surgery & Date:   6/11/2024 with Dr. Miller:  1. Tissue Mitral Valve Replacement #25 Linton Mitris.  2. Epiaortic Ultrasound.  3. Ligation of AMARA with 50 mm clip.  4. Septal myectomy via transaortic approach    6/13/24 with Dr. Hyde:   Insert PPM dual    Discharge Date: 6/20/24    MEDICATIONS:  Please refer to your After Visit Summary for your medication list. If you do not have a prescription for a new medication, you may purchase the medication over the counter.   DO NOT TAKE ANY MEDICATIONS THAT ARE NOT ON THIS LIST    INR [facility]- 2-3 for 3 months  INR LEVEL to be drawn- to be drawn on Saturday, 6/22 by  office  INR management per cardiac surgery, Dr. Miller office    6/20/24: Take 6mg total (1 tablet of 5mg and 1 tablet of 1mg)  6/21/24: Take 5mg total (1 tablet of 5mg)  6/22: HH to draw INR and call result to our office and we will instruct future dosing    INSTRUCTIONS:  NO SMOKING OR TOBACCO PRODUCTS  Follow all the instructions in your discharge book  You may shower.  Wash all incisions twice daily with mild soap and water.  No lotions, ointments or powder.  Call the office immediately for any redness, swelling, or

## 2024-06-13 NOTE — CONSULTS
[facility]    ICU TEAM Progress Note    Name: Blanche Mars   : 1955   MRN: [id number]   Date: 2024      Assessment and Plan:     Briefly, pt post MVR and septal myectomy.  PMH of HLD, lymphedema, MR, MS, and presumed AS.  Intraopertive findings of septal hypertrophy likely leading to increased subvalvular gradient.  Uncomplicated operative course save difficulty w calcified mitral annulus and no cardiac rhythm under pacer post op.    ICU Problems:  Requirement of mechanical ventilation post surgical procedure:  Wean vent support.  Extubate once stable and on SBT.  Acute hypotension:  Wean vasoactives and inotropes.  Volume resuscitation as needed.  Acute blood loss anemia:  trend CT output  No cardiac activity/asystole:  Continue pacing.  At risk for conduction abnormality given mitral exposure as well as septal myectomy.  High risk for needing ppm.      POD:  Day of Surgery    S/P:   Procedure(s):  MITRAL VALVE REPLACEMENT WITH 25MM BIOPROSTHETIC VALVE AND EXTENSIVE DEBULKING USING ULTRASONIC ASPIRATION; SEPTAL MYECTOMY; LEFT ATRIAL APPENDAGE LIGATION WITH 50MM ATRICLIP; ECC; SHALONDA & EPIAORTIC U/S BY DR. SIMMS    Active Problem List:     [unfilled]    Past Medical History:      has a past medical history of Anxiety, Benign heart murmur, Hypertension, Lymphedema, Mitral valve stenosis, and RA (rheumatoid arthritis) ([facility]).    Past Surgical History:      has a past surgical history that includes Tonsillectomy; Colonoscopy; Cardiac procedure (N/A, 2024); and joint replacement (Left, 2020).    Home Medications:     Prior to Admission medications    Medication Sig Start Date End Date Taking? Authorizing Provider   mupirocin (BACTROBAN) 2 % ointment by Each Nostril route 2 times daily 24   Rosita Villatoro APRN - NP   chlorhexidine (PERIDEX) 0.12 % solution Swish and spit 15 mLs 2 times daily for 3 days 24  Rosita Villatoro APRN - NP   amiodarone (CORDARONE) 200 MG 
     [facility]    ICU TEAM Progress Note    Name: Blanche Mars   : 1955   MRN: [id number]   Date: 2024      Assessment and Plan:     Briefly, pt post MVR and septal myectomy.  PMH of HLD, lymphedema, MR, MS, and presumed AS.  Intraopertive findings of septal hypertrophy likely leading to increased subvalvular gradient.  Uncomplicated operative course save difficulty w calcified mitral annulus and no cardiac rhythm under pacer post op.    ICU Problems:  Circulatory shock: Improving. Off epi. Wean phenylephrine.  Volume resuscitation as needed.  Acute blood loss anemia:  trend CT output    POD:  1 Day Post-Op    S/P:   Procedure(s):  MITRAL VALVE REPLACEMENT WITH 25MM BIOPROSTHETIC VALVE AND EXTENSIVE DEBULKING USING ULTRASONIC ASPIRATION; SEPTAL MYECTOMY; LEFT ATRIAL APPENDAGE LIGATION WITH 50MM ATRICLIP; ECC; SHALONDA & EPIAORTIC U/S BY DR. SIMMS    Active Problem List:     [unfilled]    Past Medical History:      has a past medical history of Anxiety, Benign heart murmur, Hypertension, Lymphedema, Mitral valve stenosis, and RA (rheumatoid arthritis) (HCC).    Past Surgical History:      has a past surgical history that includes Tonsillectomy; Colonoscopy; Cardiac procedure (N/A, 2024); joint replacement (Left, 2020); and Mitral valve replacement (N/A, 2024).    Home Medications:     Prior to Admission medications    Medication Sig Start Date End Date Taking? Authorizing Provider   mupirocin (BACTROBAN) 2 % ointment by Each Nostril route 2 times daily 24   Rosita Villatoro APRN - NP   chlorhexidine (PERIDEX) 0.12 % solution Swish and spit 15 mLs 2 times daily for 3 days 24  Rosita Villatoro APRN - NP   amiodarone (CORDARONE) 200 MG tablet Take 2 tablets by mouth 2 times daily for 5 days 24  Rosita Villatoro APRN - NP   rosuvastatin (CRESTOR) 40 MG tablet Take 1 tablet by mouth every evening 3/22/24   Bakari Randall MD   amLODIPine (NORVASC) 5 MG tablet 
[facility]  DIABETES MANAGEMENT CONSULT    Consulted by Provider, cardiac surgery team, for advanced nursing evaluation and care for inpatient blood glucose management.    Evaluation and Action Plan   Blanche Mars is a 69 y.o. female s/p MVR for aortic and mitral stenosis on 6/11/24.  Post op course uncomplicated thus far on POD 1.  On insulin infusion per cardiac surgery order set.  No PTA history of diabetes as A1C 5.0%.     6/12: Has required elevated hourly rates today ranging from 3-9u/hr at times.  Likely stress induced hyperglycemia -  Continue insulin infusion for full 48h and transition off after lunch on 6/13-     Management Rationale Action Plan   Medication  Transition off on 6/13 after lunch per order set with calculated previous 6h needs from insulin infusion and convert to a one time Lantus dose.    Continue POC accuchecks x3, may discontinue if BG <150 x3.  Continue corrective insulin per cardiac surgery order set for BG >140.   Additional orders             Initial Presentation   Blanche Mars is a 69 y.o. female admitted 6/11/24 s/p from MVR for aortic stenosis/ and mitral regurgitation.      HX:   Past Medical History:   Diagnosis Date    Anxiety     Benign heart murmur     Hypertension     Lymphedema     bilateral LE    Mitral valve stenosis     RA (rheumatoid arthritis) ([facility])         INITIAL DX: Aortic stenosis [I35.0]  Mitral regurgitation [I34.0]  Aortic stenosis, severe [I35.0]     Current Treatment     TX: cardiac surgery post op care    Hospital Course   Clinical progress has been uncomplicated in acute post op setting on 6/11-6/12..     Diabetes History   NO diabetes PTA    Subjective   ”I recently cut out starches and sugar from my diet- I reduced my A1C from 5.2 to 5.0%..”     Objective   Physical exam  General Obese/ female/male in no acute distress. Conversant and cooperative  Neuro  Alert, oriented   Vital Signs   Vitals:    06/12/24 1400   BP: 
[facility]  DIABETES MANAGEMENT CONSULT    Consulted by Provider, cardiac surgery team, for advanced nursing evaluation and care for inpatient blood glucose management.    Evaluation and Action Plan   Blanche Mars is a 69 y.o. female s/p MVR for aortic and mitral stenosis on 6/11/24.  Post op course uncomplicated thus far on POD 1.  On insulin infusion per cardiac surgery order set.  No PTA history of diabetes as A1C 5.0%.     6/13: Transitioned off insulin infusion today.  Slated for pacemaker today.  24h hour needs elevated 112.5 units.  Usually around meals hourly rate increased. NPO today -hourly needs <2u/hour.    Management Rationale Action Plan   Medication  Transition off on 6/13 after lunch per order set with calculated previous 6h needs from insulin infusion and convert to a one time Lantus dose.    Continue POC accuchecks x3, may discontinue if BG <150 x3.  Continue corrective insulin per cardiac surgery order set for BG >140.   Additional orders             Initial Presentation   Blanche Mars is a 69 y.o. female admitted 6/11/24 s/p from MVR for aortic stenosis/ and mitral regurgitation.      HX:   Past Medical History:   Diagnosis Date    Anxiety     Benign heart murmur     Hypertension     Lymphedema     bilateral LE    Mitral valve stenosis     RA (rheumatoid arthritis) ([facility])         INITIAL DX: Aortic stenosis [I35.0]  Mitral regurgitation [I34.0]  Aortic stenosis, severe [I35.0]     Current Treatment     TX: cardiac surgery post op care    Hospital Course   Clinical progress has been uncomplicated in acute post op setting on 6/11-6/12..     Diabetes History   NO diabetes PTA    Subjective   ”I recently cut out starches and sugar from my diet- I reduced my A1C from 5.2 to 5.0%..”     Objective   Physical exam  General Obese/ female/male in no acute distress. Conversant and cooperative  Neuro  Alert, oriented   Vital Signs   Vitals:    06/13/24 1300   BP: (!) 95/49 
tablet 1 tablet, 1 tablet, Oral, BID, Emery Servin PA-C, 1 tablet at 06/13/24 0833    bisacodyl (DULCOLAX) suppository 10 mg, 10 mg, Rectal, Daily PRN, Emery Servin PA-C    famotidine (PEPCID) tablet 20 mg, 20 mg, Oral, BID, 20 mg at 06/13/24 0833 **OR** famotidine (PEPCID) 20 mg in sodium chloride (PF) 0.9 % 10 mL injection, 20 mg, IntraVENous, BID, Emery Servin PA-C, 20 mg at 06/11/24 2057    potassium chloride 20 mEq/50 mL IVPB (Central Line), 20 mEq, IntraVENous, PRN, Emery Servin PA-C, Stopped at 06/11/24 1944    magnesium sulfate 2000 mg in 50 mL IVPB premix, 2,000 mg, IntraVENous, PRN, Emery Servin PA-C    ipratropium 0.5 mg-albuterol 2.5 mg (DUONEB) nebulizer solution 1 Dose, 1 Dose, Inhalation, Q4H PRN, Emery Servin PA-C    phenylephrine (HEATH-SYNEPHRINE) 50 mg in sodium chloride 0.9 % 250 mL infusion ([id number]),  mcg/min, IntraVENous, Continuous, Emery Servin PA-C, Last Rate: 3 mL/hr at 06/13/24 0826, 10 mcg/min at 06/13/24 0826    niCARdipine (CARDENE) 25 mg in sodium chloride 0.9 % 250 mL infusion ([id number]), 2.5-15 mg/hr, IntraVENous, Continuous, Emery Servin PA-C    insulin glargine (LANTUS) injection vial 1-50 Units, 1-50 Units, SubCUTAneous, Once PRN, Emery Servin PA-C    insulin (HumuLIN R) 100 units in sodium chloride 0.9% 100 mL infusion, 0.1-50 Units/hr, IntraVENous, Continuous, Emery Servin PA-C, Last Rate: 1.9 mL/hr at 06/13/24 0931, 1.9 Units/hr at 06/13/24 0931    insulin lispro (HUMALOG,ADMELOG) injection vial 1-20 Units, 1-20 Units, SubCUTAneous, TID Gareth ROCKWELL John, PA-C, 1 Units at 06/12/24 1815    insulin lispro (HUMALOG,ADMELOG) injection vial 0-12 Units, 0-12 Units, SubCUTAneous, TID Gareth ROCKWELL John, PA-C    insulin lispro (HUMALOG,ADMELOG) injection vial 0-6 Units, 0-6 Units, SubCUTAneous, Nightly, Emery Servin PA-C    glucose chewable tablet 16 g, 4 tablet, Oral, PRN, Emery Servin PA-C    dextrose bolus 10% 125 mL, 125 mL, IntraVENous, PRN **OR** dextrose bolus 10% 250 mL, 250

## 2024-06-13 NOTE — ANESTHESIA POSTPROCEDURE EVALUATION
Department of Anesthesiology  Postprocedure Note    Patient: Blanche Mars  MRN: [id number]  YOB: 1955  Date of evaluation: 6/13/2024    Procedure Summary       Date: 06/11/24 Room / Location: [facility] HEART OR [address] [place] [address] OPEN HEART    Anesthesia Start: 0750 Anesthesia Stop: 1708    Procedure: MITRAL VALVE REPLACEMENT WITH 25MM BIOPROSTHETIC VALVE AND EXTENSIVE DEBULKING USING ULTRASONIC ASPIRATION; SEPTAL MYECTOMY; LEFT ATRIAL APPENDAGE LIGATION WITH 50MM ATRICLIP; ECC; SHALONDA & EPIAORTIC U/S BY DR. SIMMS (Chest) Diagnosis:       Aortic stenosis      Mitral regurgitation      (Aortic stenosis [I35.0])      (Mitral regurgitation [I34.0])    Providers: Saroj Miller MD Responsible Provider: Monty Simms MD    Anesthesia Type: General ASA Status: 4            Anesthesia Type: General    Sigifredo Phase I:      Sigifredo Phase II:      Anesthesia Post Evaluation  Post-Anesthesia Evaluation and Assessment    Patient: Blanche Mars MRN: [id number]  SSN: [id number]    YOB: 1955  Age: 69 y.o.  Sex: female      I have evaluated the patient and they are stable and ready for discharge from the PACU.     Cardiovascular Function/Vital Signs  Visit Vitals  /67   Pulse 80   Temp 98.4 °F (36.9 °C) (Oral)   Resp 12   Ht 1.651 m (5' 5\")   Wt 110.1 kg (242 lb 11.2 oz)   SpO2 93%   BMI 40.39 kg/m²       Patient is status post General anesthesia for Procedure(s):  MITRAL VALVE REPLACEMENT WITH 25MM BIOPROSTHETIC VALVE AND EXTENSIVE DEBULKING USING ULTRASONIC ASPIRATION; SEPTAL MYECTOMY; LEFT ATRIAL APPENDAGE LIGATION WITH 50MM ATRICLIP; ECC; SHALONDA & EPIAORTIC U/S BY DR. SIMMS.    Nausea/Vomiting: None    Postoperative hydration reviewed and adequate.    Pain:      Managed    Neurological Status:       At baseline    Mental Status, Level of Consciousness: Arousable    Pulmonary Status:       Adequate oxygenation and airway patent    Complications related to anesthesia: None    Post-anesthesia assessment

## 2024-06-14 ENCOUNTER — APPOINTMENT (OUTPATIENT)
Facility: HOSPITAL | Age: 69
DRG: 220 | End: 2024-06-14
Attending: THORACIC SURGERY (CARDIOTHORACIC VASCULAR SURGERY)
Payer: MEDICARE

## 2024-06-14 LAB
ANION GAP SERPL CALC-SCNC: 4 MMOL/L (ref 5–15)
BUN SERPL-MCNC: 25 MG/DL (ref 6–20)
BUN/CREAT SERPL: 45 (ref 12–20)
CALCIUM SERPL-MCNC: 8.2 MG/DL (ref 8.5–10.1)
CHLORIDE SERPL-SCNC: 110 MMOL/L (ref 97–108)
CO2 SERPL-SCNC: 23 MMOL/L (ref 21–32)
CREAT SERPL-MCNC: 0.55 MG/DL (ref 0.55–1.02)
ERYTHROCYTE [DISTWIDTH] IN BLOOD BY AUTOMATED COUNT: 13.6 % (ref 11.5–14.5)
GLUCOSE BLD STRIP.AUTO-MCNC: 114 MG/DL (ref 65–117)
GLUCOSE SERPL-MCNC: 105 MG/DL (ref 65–100)
HCT VFR BLD AUTO: 28.8 % (ref 35–47)
HGB BLD-MCNC: 9.3 G/DL (ref 11.5–16)
INR PPP: 1.1 (ref 0.9–1.1)
MAGNESIUM SERPL-MCNC: 2.8 MG/DL (ref 1.6–2.4)
MCH RBC QN AUTO: 33.1 PG (ref 26–34)
MCHC RBC AUTO-ENTMCNC: 32.3 G/DL (ref 30–36.5)
MCV RBC AUTO: 102.5 FL (ref 80–99)
NRBC # BLD: 0 K/UL (ref 0–0.01)
NRBC BLD-RTO: 0 PER 100 WBC
PLATELET # BLD AUTO: 72 K/UL (ref 150–400)
PMV BLD AUTO: 11.7 FL (ref 8.9–12.9)
POTASSIUM SERPL-SCNC: 4.5 MMOL/L (ref 3.5–5.1)
RBC # BLD AUTO: 2.81 M/UL (ref 3.8–5.2)
SERVICE CMNT-IMP: NORMAL
SODIUM SERPL-SCNC: 137 MMOL/L (ref 136–145)
WBC # BLD AUTO: 11 K/UL (ref 3.6–11)

## 2024-06-14 PROCEDURE — 82962 GLUCOSE BLOOD TEST: CPT

## 2024-06-14 PROCEDURE — 85027 COMPLETE CBC AUTOMATED: CPT

## 2024-06-14 PROCEDURE — 6370000000 HC RX 637 (ALT 250 FOR IP): Performed by: NURSE PRACTITIONER

## 2024-06-14 PROCEDURE — 36415 COLL VENOUS BLD VENIPUNCTURE: CPT

## 2024-06-14 PROCEDURE — 2060000000 HC ICU INTERMEDIATE R&B

## 2024-06-14 PROCEDURE — 51701 INSERT BLADDER CATHETER: CPT

## 2024-06-14 PROCEDURE — 97530 THERAPEUTIC ACTIVITIES: CPT

## 2024-06-14 PROCEDURE — 6370000000 HC RX 637 (ALT 250 FOR IP): Performed by: EMERGENCY MEDICINE

## 2024-06-14 PROCEDURE — 80048 BASIC METABOLIC PNL TOTAL CA: CPT

## 2024-06-14 PROCEDURE — 6370000000 HC RX 637 (ALT 250 FOR IP)

## 2024-06-14 PROCEDURE — 2580000003 HC RX 258: Performed by: PHYSICIAN ASSISTANT

## 2024-06-14 PROCEDURE — 6370000000 HC RX 637 (ALT 250 FOR IP): Performed by: PHYSICIAN ASSISTANT

## 2024-06-14 PROCEDURE — 85610 PROTHROMBIN TIME: CPT

## 2024-06-14 PROCEDURE — 71045 X-RAY EXAM CHEST 1 VIEW: CPT

## 2024-06-14 PROCEDURE — 97116 GAIT TRAINING THERAPY: CPT

## 2024-06-14 PROCEDURE — 51798 US URINE CAPACITY MEASURE: CPT

## 2024-06-14 PROCEDURE — 83735 ASSAY OF MAGNESIUM: CPT

## 2024-06-14 PROCEDURE — 97535 SELF CARE MNGMENT TRAINING: CPT

## 2024-06-14 RX ORDER — PANTOPRAZOLE SODIUM 40 MG/1
40 TABLET, DELAYED RELEASE ORAL
Status: DISCONTINUED | OUTPATIENT
Start: 2024-06-15 | End: 2024-06-20 | Stop reason: HOSPADM

## 2024-06-14 RX ORDER — WARFARIN SODIUM 2 MG/1
2 TABLET ORAL
Status: COMPLETED | OUTPATIENT
Start: 2024-06-14 | End: 2024-06-14

## 2024-06-14 RX ORDER — POTASSIUM CHLORIDE 750 MG/1
10 TABLET, FILM COATED, EXTENDED RELEASE ORAL 2 TIMES DAILY
Status: DISCONTINUED | OUTPATIENT
Start: 2024-06-14 | End: 2024-06-19

## 2024-06-14 RX ORDER — FUROSEMIDE 40 MG/1
40 TABLET ORAL 2 TIMES DAILY
Status: DISCONTINUED | OUTPATIENT
Start: 2024-06-14 | End: 2024-06-15

## 2024-06-14 RX ADMIN — CHLORHEXIDINE GLUCONATE 15 ML: 1.2 RINSE ORAL at 09:08

## 2024-06-14 RX ADMIN — CHLORHEXIDINE GLUCONATE 15 ML: 1.2 RINSE ORAL at 20:49

## 2024-06-14 RX ADMIN — SODIUM CHLORIDE, PRESERVATIVE FREE 10 ML: 5 INJECTION INTRAVENOUS at 09:08

## 2024-06-14 RX ADMIN — ACETAMINOPHEN 975 MG: 325 TABLET ORAL at 01:09

## 2024-06-14 RX ADMIN — FAMOTIDINE 20 MG: 20 TABLET, FILM COATED ORAL at 09:07

## 2024-06-14 RX ADMIN — WARFARIN SODIUM 2 MG: 2 TABLET ORAL at 17:05

## 2024-06-14 RX ADMIN — MUPIROCIN: 20 OINTMENT TOPICAL at 09:08

## 2024-06-14 RX ADMIN — GABAPENTIN 200 MG: 100 CAPSULE ORAL at 09:07

## 2024-06-14 RX ADMIN — FUROSEMIDE 40 MG: 40 TABLET ORAL at 10:28

## 2024-06-14 RX ADMIN — GABAPENTIN 200 MG: 100 CAPSULE ORAL at 20:47

## 2024-06-14 RX ADMIN — MIDODRINE HYDROCHLORIDE 10 MG: 5 TABLET ORAL at 09:07

## 2024-06-14 RX ADMIN — ACETAMINOPHEN 975 MG: 325 TABLET ORAL at 07:15

## 2024-06-14 RX ADMIN — SENNOSIDES AND DOCUSATE SODIUM 1 TABLET: 50; 8.6 TABLET ORAL at 09:07

## 2024-06-14 RX ADMIN — ASPIRIN 81 MG: 81 TABLET, COATED ORAL at 09:07

## 2024-06-14 RX ADMIN — SODIUM CHLORIDE, PRESERVATIVE FREE 10 ML: 5 INJECTION INTRAVENOUS at 20:50

## 2024-06-14 RX ADMIN — POTASSIUM CHLORIDE 10 MEQ: 750 TABLET, FILM COATED, EXTENDED RELEASE ORAL at 10:28

## 2024-06-14 RX ADMIN — Medication 1 TABLET: at 09:07

## 2024-06-14 RX ADMIN — POTASSIUM CHLORIDE 10 MEQ: 750 TABLET, FILM COATED, EXTENDED RELEASE ORAL at 14:35

## 2024-06-14 RX ADMIN — FUROSEMIDE 40 MG: 40 TABLET ORAL at 14:35

## 2024-06-14 RX ADMIN — GABAPENTIN 200 MG: 100 CAPSULE ORAL at 14:35

## 2024-06-14 RX ADMIN — ACETAMINOPHEN 975 MG: 325 TABLET ORAL at 10:28

## 2024-06-14 RX ADMIN — ROSUVASTATIN 40 MG: 40 TABLET, FILM COATED ORAL at 17:05

## 2024-06-14 NOTE — DIABETES MGMT
Diabetes Management Team to sign off at this point as patient's blood glucose remains stable.  Please re-consult us if patient needs change.  Thank you for including us in their care.      RADHA OLIVA - [facility]   Program for Diabetes Health

## 2024-06-14 NOTE — PROCEDURES
PROCEDURE NOTE  Date: 6/14/2024   Name: Blanche Mars  YOB: 1955    Procedures        External atrial and ventricular epicardial wire sites cleansed with alcohol. Sutures cut and removed. Traction pulled on atrial wire, wire easily removed without resistance. Traction pulled on ventricular wires, wires easily removed without resistance. Pt tolerated procedure well.   
PROCEDURE NOTE  Date: 6/14/2024   Name: Blanche Mars  YOB: 1955    Procedures      Pleurevac to water seal, no air leak present with deep breathing by patient. Chest tube sites cleansed with alcohol. Sutures cut and removed. Chest tubes discontinued on exhalation. Occlusive dressing applied. Patient tolerated procedure well.     
PROCEDURE NOTE  Date: 6/14/2024   Name: Blanche Mars  YOB: 1955    Procedures    Margy removed 1696      
using 0 Silk suture. Final pacing and sensing thresholds were checked and were good.     Wound Closure  A subcutaneous pocket was then created using blunt dissection and it was copiously irrigated with a saline solution containing antibiotics. The leads were connected to a Medtronic generator and the entire system was implanted into the pocket. The subcutaneous tissues were then closed with 2 continuous layers of 2-0 Stratafix sutures. The skin was closed with a running 4-0 Stratafix subcuticular stitch. Steri strips were placed over the incision. The wound was covered with a dry sterile dressing.    The patient tolerated the procedure well and left the laboratory in good condition. Conscious sedation was provided and appropriate monitoring performed by members of the EP nursing staff.    CONCLUSIONS:  Successful implantation of a Medtronic dual chamber Pacemaker.    RECOMMENDATIONS:  1. CXR to assess for leads placement and rule out complications  2. F/U device clinic/wound check in 10-14 days  3. EP clinic follow-up in 4 months.

## 2024-06-14 NOTE — DISCHARGE SUMMARY
Cardiac Surgery Discharge Summary     Patient ID:  Blanche Mars  [id number]  69 y.o.  1955    Admit date: 6/11/2024    Discharge date: 6/20/2024     Admitting Physician: Saroj Miller MD     Referring Cardiologist:  Dr. Pepper    PCP:  Thao Dobbins MD    Admitting Diagnoses: severe mitral regurgitation, moderate aortic stenosis    Discharge Diagnoses: s/p mitral valve replacement, moderate aortic stenosis    1. Nonrheumatic mitral valve regurgitation    2. Mitral valve insufficiency, unspecified etiology    3. S/P ventricular septal myectomy    4. S/P MVR (mitral valve replacement)    5. Aortic stenosis, severe    6. S/P left atrial appendage ligation    7. Complete heart block (HCC)        Discharged Condition: Stable and Fair    Disposition: home, see patient instructions for treatment and plan    Procedures for this admission:  6/11/2024 with Dr. Miller:  1. Tissue Mitral Valve Replacement #25 Linton Mitris.  2. Epiaortic Ultrasound.  3. Ligation of AMARA with 50 mm clip.  4. Septal myectomy via transaortic approach.    6/13/24 with Dr. Hyde:  Successful implantation of a Medtronic dual chamber Pacemaker. ([place]™ XT DR VIDAL [place] )    Discharge Medications:      Medication List        START taking these medications      acetaminophen 325 MG tablet  Commonly known as: TYLENOL  Take 3 tablets by mouth every 6 hours Transition to PRN as pain improves     aspirin 81 MG EC tablet  Take 1 tablet by mouth daily  Start taking on: June 21, 2024     furosemide 20 MG tablet  Commonly known as: LASIX  Take 3 tablets by mouth daily for 5 days  Start taking on: June 21, 2024     gabapentin 100 MG capsule  Commonly known as: NEURONTIN  Take 2 capsules by mouth 3 times daily for 3 days, THEN 1 capsule 3 times daily for 7 days. Max Daily Amount: 600 mg.  Start taking on: June 20, 2024     lidocaine 4 % external patch  Apply 2 patches topically daily for 5 days To where pain is located (not directly on

## 2024-06-14 NOTE — CARE COORDINATION
Transition of Care Plan:    RUR: 12%  Prior Level of Functioning: Lives w daughter and son in law. Cane for mobility and owns a RW.   Disposition:  Anticipated d/c 6/16. [facility] and AVS updated.  If SNF or IPR: Date FOC offered: NA  Follow up appointments: Cardiology  DME needed: Owns DME for d/c   Transportation at discharge: Family  IM/IMM Medicare/ letter given:   Is patient a  and connected with VA?    If yes, was  transfer form completed and VA notified?   Caregiver Contact: DaughterDory [phone]  Discharge Caregiver contacted prior to discharge?   Care Conference needed?   Barriers to discharge:  Medical, HH acceptance    Patient is open to any HH agency that can accept and CM sent referrals in [facility] and [facility].    INR order at the top of the HH order.      Update 3pm: [facility] and [facility] have declined.    [facility] accepted and AVS updated.    ELAINE Zarate CCM  Care Management

## 2024-06-15 ENCOUNTER — APPOINTMENT (OUTPATIENT)
Facility: HOSPITAL | Age: 69
DRG: 220 | End: 2024-06-15
Attending: THORACIC SURGERY (CARDIOTHORACIC VASCULAR SURGERY)
Payer: MEDICARE

## 2024-06-15 LAB
ANION GAP SERPL CALC-SCNC: 3 MMOL/L (ref 5–15)
BUN SERPL-MCNC: 28 MG/DL (ref 6–20)
BUN/CREAT SERPL: 48 (ref 12–20)
CALCIUM SERPL-MCNC: 7.7 MG/DL (ref 8.5–10.1)
CHLORIDE SERPL-SCNC: 108 MMOL/L (ref 97–108)
CO2 SERPL-SCNC: 27 MMOL/L (ref 21–32)
CREAT SERPL-MCNC: 0.58 MG/DL (ref 0.55–1.02)
ECHO AV AREA PEAK VELOCITY: 1.7 CM2
ECHO AV AREA VTI: 2.1 CM2
ECHO AV AREA/BSA PEAK VELOCITY: 0.8 CM2/M2
ECHO AV AREA/BSA VTI: 1 CM2/M2
ECHO AV MEAN GRADIENT: 20 MMHG
ECHO AV PEAK VELOCITY: 3.2 M/S
ECHO AV VELOCITY RATIO: 0.5
ECHO AV VTI: 57.3 CM
ECHO LV FRACTIONAL SHORTENING: 20 % (ref 28–44)
ECHO LV INTERNAL DIMENSION DIASTOLE INDEX: 2.37 CM/M2
ECHO LV INTERNAL DIMENSION DIASTOLIC: 5.1 CM (ref 3.9–5.3)
ECHO LV INTERNAL DIMENSION SYSTOLIC: 4.1 CM
ECHO LV IVSD: 1.5 CM (ref 0.6–0.9)
ECHO LV MASS 2D: 316.2 G (ref 67–162)
ECHO LV MASS INDEX 2D: 147.1 G/M2 (ref 43–95)
ECHO LV POSTERIOR WALL DIASTOLIC: 1.4 CM (ref 0.6–0.9)
ECHO LV RELATIVE WALL THICKNESS RATIO: 0.55
ECHO LVOT AREA: 3.5 CM2
ECHO LVOT AV VTI INDEX: 0.62
ECHO LVOT DIAM: 2.1 CM
ECHO LVOT MEAN GRADIENT: 6 MMHG
ECHO LVOT PEAK VELOCITY: 1.6 M/S
ECHO LVOT STROKE VOLUME INDEX: 57.2 ML/M2
ECHO LVOT SV: 122.9 ML
ECHO LVOT VTI: 35.5 CM
ECHO MV A VELOCITY: 0.44 M/S
ECHO MV AREA VTI: 2.1 CM2
ECHO MV E VELOCITY: 1.72 M/S
ECHO MV E/A RATIO: 3.91
ECHO MV LVOT VTI INDEX: 1.68
ECHO MV MEAN GRADIENT: 5 MMHG
ECHO MV MEAN VELOCITY: 1 M/S
ECHO MV PEAK GRADIENT: 17 MMHG
ECHO MV REGURGITANT PEAK GRADIENT: 9 MMHG
ECHO MV REGURGITANT PEAK VELOCITY: 1.5 M/S
ECHO MV VTI: 59.6 CM
ECHO RV TAPSE: 0.7 CM (ref 1.7–?)
ECHO TV MEAN GRADIENT: 6 MMHG
ECHO TV MEAN VELOCITY: 1 M/S
ECHO TV PEAK GRADIENT: 22 MMHG
ECHO TV REGURGITANT MAX VELOCITY: 2.68 M/S
ECHO TV REGURGITANT PEAK GRADIENT: 29 MMHG
ERYTHROCYTE [DISTWIDTH] IN BLOOD BY AUTOMATED COUNT: 13.5 % (ref 11.5–14.5)
GLUCOSE SERPL-MCNC: 114 MG/DL (ref 65–100)
HCT VFR BLD AUTO: 27.9 % (ref 35–47)
HGB BLD-MCNC: 9.5 G/DL (ref 11.5–16)
INR PPP: 1.1 (ref 0.9–1.1)
MAGNESIUM SERPL-MCNC: 2.7 MG/DL (ref 1.6–2.4)
MCH RBC QN AUTO: 34.2 PG (ref 26–34)
MCHC RBC AUTO-ENTMCNC: 34.1 G/DL (ref 30–36.5)
MCV RBC AUTO: 100.4 FL (ref 80–99)
NRBC # BLD: 0 K/UL (ref 0–0.01)
NRBC BLD-RTO: 0 PER 100 WBC
PLATELET # BLD AUTO: 86 K/UL (ref 150–400)
PMV BLD AUTO: 11.2 FL (ref 8.9–12.9)
POTASSIUM SERPL-SCNC: 4.1 MMOL/L (ref 3.5–5.1)
PROTHROMBIN TIME: 11.4 SEC (ref 9–11.1)
RBC # BLD AUTO: 2.78 M/UL (ref 3.8–5.2)
SODIUM SERPL-SCNC: 138 MMOL/L (ref 136–145)
WBC # BLD AUTO: 8.3 K/UL (ref 3.6–11)

## 2024-06-15 PROCEDURE — 2580000003 HC RX 258: Performed by: PHYSICIAN ASSISTANT

## 2024-06-15 PROCEDURE — 6370000000 HC RX 637 (ALT 250 FOR IP): Performed by: NURSE PRACTITIONER

## 2024-06-15 PROCEDURE — C8924 2D TTE W OR W/O FOL W/CON,FU: HCPCS

## 2024-06-15 PROCEDURE — 6370000000 HC RX 637 (ALT 250 FOR IP)

## 2024-06-15 PROCEDURE — 80048 BASIC METABOLIC PNL TOTAL CA: CPT

## 2024-06-15 PROCEDURE — 85610 PROTHROMBIN TIME: CPT

## 2024-06-15 PROCEDURE — 2060000000 HC ICU INTERMEDIATE R&B

## 2024-06-15 PROCEDURE — 6360000004 HC RX CONTRAST MEDICATION: Performed by: THORACIC SURGERY (CARDIOTHORACIC VASCULAR SURGERY)

## 2024-06-15 PROCEDURE — 36415 COLL VENOUS BLD VENIPUNCTURE: CPT

## 2024-06-15 PROCEDURE — 97116 GAIT TRAINING THERAPY: CPT

## 2024-06-15 PROCEDURE — 6370000000 HC RX 637 (ALT 250 FOR IP): Performed by: THORACIC SURGERY (CARDIOTHORACIC VASCULAR SURGERY)

## 2024-06-15 PROCEDURE — 71046 X-RAY EXAM CHEST 2 VIEWS: CPT

## 2024-06-15 PROCEDURE — 85027 COMPLETE CBC AUTOMATED: CPT

## 2024-06-15 PROCEDURE — 6370000000 HC RX 637 (ALT 250 FOR IP): Performed by: PHYSICIAN ASSISTANT

## 2024-06-15 PROCEDURE — 97110 THERAPEUTIC EXERCISES: CPT

## 2024-06-15 PROCEDURE — 83735 ASSAY OF MAGNESIUM: CPT

## 2024-06-15 RX ORDER — FUROSEMIDE 40 MG/1
40 TABLET ORAL DAILY
Status: DISCONTINUED | OUTPATIENT
Start: 2024-06-16 | End: 2024-06-19

## 2024-06-15 RX ADMIN — SODIUM CHLORIDE, PRESERVATIVE FREE 10 ML: 5 INJECTION INTRAVENOUS at 20:31

## 2024-06-15 RX ADMIN — ASPIRIN 81 MG: 81 TABLET, COATED ORAL at 08:02

## 2024-06-15 RX ADMIN — GABAPENTIN 200 MG: 100 CAPSULE ORAL at 20:27

## 2024-06-15 RX ADMIN — SENNOSIDES AND DOCUSATE SODIUM 1 TABLET: 50; 8.6 TABLET ORAL at 08:02

## 2024-06-15 RX ADMIN — CHLORHEXIDINE GLUCONATE 15 ML: 1.2 RINSE ORAL at 20:29

## 2024-06-15 RX ADMIN — POTASSIUM CHLORIDE 10 MEQ: 750 TABLET, FILM COATED, EXTENDED RELEASE ORAL at 08:02

## 2024-06-15 RX ADMIN — CHLORHEXIDINE GLUCONATE 15 ML: 1.2 RINSE ORAL at 08:03

## 2024-06-15 RX ADMIN — Medication 1 TABLET: at 08:02

## 2024-06-15 RX ADMIN — PANTOPRAZOLE SODIUM 40 MG: 40 TABLET, DELAYED RELEASE ORAL at 06:33

## 2024-06-15 RX ADMIN — MUPIROCIN: 20 OINTMENT TOPICAL at 08:03

## 2024-06-15 RX ADMIN — ROSUVASTATIN 40 MG: 40 TABLET, FILM COATED ORAL at 17:32

## 2024-06-15 RX ADMIN — FUROSEMIDE 40 MG: 40 TABLET ORAL at 08:02

## 2024-06-15 RX ADMIN — GABAPENTIN 200 MG: 100 CAPSULE ORAL at 08:02

## 2024-06-15 RX ADMIN — ACETAMINOPHEN 975 MG: 325 TABLET ORAL at 06:33

## 2024-06-15 RX ADMIN — SODIUM CHLORIDE, PRESERVATIVE FREE 10 ML: 5 INJECTION INTRAVENOUS at 08:03

## 2024-06-15 RX ADMIN — POTASSIUM CHLORIDE 10 MEQ: 750 TABLET, FILM COATED, EXTENDED RELEASE ORAL at 17:32

## 2024-06-15 RX ADMIN — ACETAMINOPHEN 975 MG: 325 TABLET ORAL at 17:32

## 2024-06-15 RX ADMIN — WARFARIN SODIUM 3 MG: 2 TABLET ORAL at 17:32

## 2024-06-15 RX ADMIN — ACETAMINOPHEN 975 MG: 325 TABLET ORAL at 12:53

## 2024-06-15 RX ADMIN — MUPIROCIN: 20 OINTMENT TOPICAL at 20:31

## 2024-06-15 RX ADMIN — PERFLUTREN 1.5 ML: 6.52 INJECTION, SUSPENSION INTRAVENOUS at 12:52

## 2024-06-15 RX ADMIN — GABAPENTIN 200 MG: 100 CAPSULE ORAL at 12:53

## 2024-06-16 ENCOUNTER — APPOINTMENT (OUTPATIENT)
Facility: HOSPITAL | Age: 69
DRG: 220 | End: 2024-06-16
Attending: THORACIC SURGERY (CARDIOTHORACIC VASCULAR SURGERY)
Payer: MEDICARE

## 2024-06-16 LAB
ANION GAP SERPL CALC-SCNC: 3 MMOL/L (ref 5–15)
BUN SERPL-MCNC: 27 MG/DL (ref 6–20)
BUN/CREAT SERPL: 47 (ref 12–20)
CALCIUM SERPL-MCNC: 8 MG/DL (ref 8.5–10.1)
CHLORIDE SERPL-SCNC: 107 MMOL/L (ref 97–108)
CO2 SERPL-SCNC: 27 MMOL/L (ref 21–32)
CREAT SERPL-MCNC: 0.58 MG/DL (ref 0.55–1.02)
ERYTHROCYTE [DISTWIDTH] IN BLOOD BY AUTOMATED COUNT: 13.5 % (ref 11.5–14.5)
GLUCOSE SERPL-MCNC: 112 MG/DL (ref 65–100)
HCT VFR BLD AUTO: 29.3 % (ref 35–47)
HGB BLD-MCNC: 9.7 G/DL (ref 11.5–16)
INR PPP: 1.1 (ref 0.9–1.1)
MAGNESIUM SERPL-MCNC: 2.6 MG/DL (ref 1.6–2.4)
MCH RBC QN AUTO: 33.7 PG (ref 26–34)
MCHC RBC AUTO-ENTMCNC: 33.1 G/DL (ref 30–36.5)
MCV RBC AUTO: 101.7 FL (ref 80–99)
NRBC # BLD: 0.02 K/UL (ref 0–0.01)
NRBC BLD-RTO: 0.3 PER 100 WBC
PLATELET # BLD AUTO: 104 K/UL (ref 150–400)
PMV BLD AUTO: 10.9 FL (ref 8.9–12.9)
POTASSIUM SERPL-SCNC: 4.4 MMOL/L (ref 3.5–5.1)
PROTHROMBIN TIME: 11.1 SEC (ref 9–11.1)
RBC # BLD AUTO: 2.88 M/UL (ref 3.8–5.2)
SODIUM SERPL-SCNC: 137 MMOL/L (ref 136–145)
WBC # BLD AUTO: 7.3 K/UL (ref 3.6–11)

## 2024-06-16 PROCEDURE — 83735 ASSAY OF MAGNESIUM: CPT

## 2024-06-16 PROCEDURE — 80048 BASIC METABOLIC PNL TOTAL CA: CPT

## 2024-06-16 PROCEDURE — 2580000003 HC RX 258: Performed by: PHYSICIAN ASSISTANT

## 2024-06-16 PROCEDURE — 97116 GAIT TRAINING THERAPY: CPT

## 2024-06-16 PROCEDURE — 6370000000 HC RX 637 (ALT 250 FOR IP): Performed by: NURSE PRACTITIONER

## 2024-06-16 PROCEDURE — 2060000000 HC ICU INTERMEDIATE R&B

## 2024-06-16 PROCEDURE — 85027 COMPLETE CBC AUTOMATED: CPT

## 2024-06-16 PROCEDURE — 71045 X-RAY EXAM CHEST 1 VIEW: CPT

## 2024-06-16 PROCEDURE — 36415 COLL VENOUS BLD VENIPUNCTURE: CPT

## 2024-06-16 PROCEDURE — 97535 SELF CARE MNGMENT TRAINING: CPT

## 2024-06-16 PROCEDURE — 6370000000 HC RX 637 (ALT 250 FOR IP): Performed by: PHYSICIAN ASSISTANT

## 2024-06-16 PROCEDURE — 85610 PROTHROMBIN TIME: CPT

## 2024-06-16 PROCEDURE — 6370000000 HC RX 637 (ALT 250 FOR IP): Performed by: THORACIC SURGERY (CARDIOTHORACIC VASCULAR SURGERY)

## 2024-06-16 PROCEDURE — 97530 THERAPEUTIC ACTIVITIES: CPT

## 2024-06-16 PROCEDURE — 6370000000 HC RX 637 (ALT 250 FOR IP)

## 2024-06-16 RX ORDER — WARFARIN SODIUM 5 MG/1
5 TABLET ORAL
Status: COMPLETED | OUTPATIENT
Start: 2024-06-16 | End: 2024-06-16

## 2024-06-16 RX ADMIN — CHLORHEXIDINE GLUCONATE 15 ML: 1.2 RINSE ORAL at 08:08

## 2024-06-16 RX ADMIN — POTASSIUM CHLORIDE 10 MEQ: 750 TABLET, FILM COATED, EXTENDED RELEASE ORAL at 08:09

## 2024-06-16 RX ADMIN — ROSUVASTATIN 40 MG: 40 TABLET, FILM COATED ORAL at 17:17

## 2024-06-16 RX ADMIN — GABAPENTIN 200 MG: 100 CAPSULE ORAL at 13:02

## 2024-06-16 RX ADMIN — GABAPENTIN 200 MG: 100 CAPSULE ORAL at 08:09

## 2024-06-16 RX ADMIN — ACETAMINOPHEN 975 MG: 325 TABLET ORAL at 17:17

## 2024-06-16 RX ADMIN — SODIUM CHLORIDE, PRESERVATIVE FREE 10 ML: 5 INJECTION INTRAVENOUS at 20:30

## 2024-06-16 RX ADMIN — ACETAMINOPHEN 975 MG: 325 TABLET ORAL at 13:02

## 2024-06-16 RX ADMIN — Medication 1 TABLET: at 08:09

## 2024-06-16 RX ADMIN — GABAPENTIN 200 MG: 100 CAPSULE ORAL at 20:38

## 2024-06-16 RX ADMIN — MUPIROCIN: 20 OINTMENT TOPICAL at 08:08

## 2024-06-16 RX ADMIN — PANTOPRAZOLE SODIUM 40 MG: 40 TABLET, DELAYED RELEASE ORAL at 08:09

## 2024-06-16 RX ADMIN — FUROSEMIDE 40 MG: 40 TABLET ORAL at 08:09

## 2024-06-16 RX ADMIN — POTASSIUM CHLORIDE 10 MEQ: 750 TABLET, FILM COATED, EXTENDED RELEASE ORAL at 17:18

## 2024-06-16 RX ADMIN — WARFARIN SODIUM 5 MG: 5 TABLET ORAL at 17:18

## 2024-06-16 RX ADMIN — CHLORHEXIDINE GLUCONATE 15 ML: 1.2 RINSE ORAL at 20:29

## 2024-06-16 RX ADMIN — ASPIRIN 81 MG: 81 TABLET, COATED ORAL at 08:09

## 2024-06-17 ENCOUNTER — APPOINTMENT (OUTPATIENT)
Facility: HOSPITAL | Age: 69
DRG: 220 | End: 2024-06-17
Attending: THORACIC SURGERY (CARDIOTHORACIC VASCULAR SURGERY)
Payer: MEDICARE

## 2024-06-17 LAB
ANION GAP SERPL CALC-SCNC: 2 MMOL/L (ref 5–15)
BUN SERPL-MCNC: 21 MG/DL (ref 6–20)
BUN/CREAT SERPL: 38 (ref 12–20)
CALCIUM SERPL-MCNC: 8.1 MG/DL (ref 8.5–10.1)
CHLORIDE SERPL-SCNC: 107 MMOL/L (ref 97–108)
CO2 SERPL-SCNC: 29 MMOL/L (ref 21–32)
CREAT SERPL-MCNC: 0.56 MG/DL (ref 0.55–1.02)
ERYTHROCYTE [DISTWIDTH] IN BLOOD BY AUTOMATED COUNT: 13.8 % (ref 11.5–14.5)
GLUCOSE SERPL-MCNC: 101 MG/DL (ref 65–100)
HCT VFR BLD AUTO: 28.2 % (ref 35–47)
HGB BLD-MCNC: 9.3 G/DL (ref 11.5–16)
INR PPP: 1.1 (ref 0.9–1.1)
MAGNESIUM SERPL-MCNC: 2.4 MG/DL (ref 1.6–2.4)
MCH RBC QN AUTO: 33.7 PG (ref 26–34)
MCHC RBC AUTO-ENTMCNC: 33 G/DL (ref 30–36.5)
MCV RBC AUTO: 102.2 FL (ref 80–99)
NRBC # BLD: 0.02 K/UL (ref 0–0.01)
NRBC BLD-RTO: 0.3 PER 100 WBC
PLATELET # BLD AUTO: 137 K/UL (ref 150–400)
PMV BLD AUTO: 10.7 FL (ref 8.9–12.9)
POTASSIUM SERPL-SCNC: 4.1 MMOL/L (ref 3.5–5.1)
PROTHROMBIN TIME: 11.8 SEC (ref 9–11.1)
RBC # BLD AUTO: 2.76 M/UL (ref 3.8–5.2)
SODIUM SERPL-SCNC: 138 MMOL/L (ref 136–145)
WBC # BLD AUTO: 6.8 K/UL (ref 3.6–11)

## 2024-06-17 PROCEDURE — 6370000000 HC RX 637 (ALT 250 FOR IP): Performed by: PHYSICIAN ASSISTANT

## 2024-06-17 PROCEDURE — 36415 COLL VENOUS BLD VENIPUNCTURE: CPT

## 2024-06-17 PROCEDURE — 2060000000 HC ICU INTERMEDIATE R&B

## 2024-06-17 PROCEDURE — 85027 COMPLETE CBC AUTOMATED: CPT

## 2024-06-17 PROCEDURE — 6370000000 HC RX 637 (ALT 250 FOR IP): Performed by: NURSE PRACTITIONER

## 2024-06-17 PROCEDURE — 80048 BASIC METABOLIC PNL TOTAL CA: CPT

## 2024-06-17 PROCEDURE — 85610 PROTHROMBIN TIME: CPT

## 2024-06-17 PROCEDURE — 83735 ASSAY OF MAGNESIUM: CPT

## 2024-06-17 PROCEDURE — 2580000003 HC RX 258: Performed by: PHYSICIAN ASSISTANT

## 2024-06-17 PROCEDURE — 97116 GAIT TRAINING THERAPY: CPT

## 2024-06-17 PROCEDURE — 6370000000 HC RX 637 (ALT 250 FOR IP)

## 2024-06-17 PROCEDURE — 97535 SELF CARE MNGMENT TRAINING: CPT

## 2024-06-17 RX ORDER — FUROSEMIDE 40 MG/1
40 TABLET ORAL ONCE
Status: COMPLETED | OUTPATIENT
Start: 2024-06-17 | End: 2024-06-17

## 2024-06-17 RX ORDER — POTASSIUM CHLORIDE 750 MG/1
20 TABLET, FILM COATED, EXTENDED RELEASE ORAL ONCE
Status: COMPLETED | OUTPATIENT
Start: 2024-06-17 | End: 2024-06-17

## 2024-06-17 RX ORDER — WARFARIN SODIUM 5 MG/1
5 TABLET ORAL
Status: COMPLETED | OUTPATIENT
Start: 2024-06-17 | End: 2024-06-17

## 2024-06-17 RX ADMIN — GABAPENTIN 200 MG: 100 CAPSULE ORAL at 13:32

## 2024-06-17 RX ADMIN — POTASSIUM CHLORIDE 10 MEQ: 750 TABLET, FILM COATED, EXTENDED RELEASE ORAL at 15:44

## 2024-06-17 RX ADMIN — ACETAMINOPHEN 975 MG: 325 TABLET ORAL at 08:35

## 2024-06-17 RX ADMIN — CHLORHEXIDINE GLUCONATE 15 ML: 1.2 RINSE ORAL at 20:14

## 2024-06-17 RX ADMIN — GABAPENTIN 200 MG: 100 CAPSULE ORAL at 20:12

## 2024-06-17 RX ADMIN — FUROSEMIDE 40 MG: 40 TABLET ORAL at 18:44

## 2024-06-17 RX ADMIN — SODIUM CHLORIDE, PRESERVATIVE FREE 10 ML: 5 INJECTION INTRAVENOUS at 08:37

## 2024-06-17 RX ADMIN — ACETAMINOPHEN 975 MG: 325 TABLET ORAL at 18:43

## 2024-06-17 RX ADMIN — ROSUVASTATIN 40 MG: 40 TABLET, FILM COATED ORAL at 18:44

## 2024-06-17 RX ADMIN — POTASSIUM CHLORIDE 10 MEQ: 750 TABLET, FILM COATED, EXTENDED RELEASE ORAL at 08:37

## 2024-06-17 RX ADMIN — CHLORHEXIDINE GLUCONATE 15 ML: 1.2 RINSE ORAL at 08:37

## 2024-06-17 RX ADMIN — ASPIRIN 81 MG: 81 TABLET, COATED ORAL at 08:35

## 2024-06-17 RX ADMIN — WARFARIN SODIUM 5 MG: 5 TABLET ORAL at 18:44

## 2024-06-17 RX ADMIN — ACETAMINOPHEN 975 MG: 325 TABLET ORAL at 02:08

## 2024-06-17 RX ADMIN — GABAPENTIN 200 MG: 100 CAPSULE ORAL at 08:35

## 2024-06-17 RX ADMIN — ACETAMINOPHEN 975 MG: 325 TABLET ORAL at 13:32

## 2024-06-17 RX ADMIN — ACETAMINOPHEN 975 MG: 325 TABLET ORAL at 23:57

## 2024-06-17 RX ADMIN — Medication 1 TABLET: at 08:36

## 2024-06-17 RX ADMIN — POTASSIUM CHLORIDE 20 MEQ: 750 TABLET, FILM COATED, EXTENDED RELEASE ORAL at 18:44

## 2024-06-17 RX ADMIN — FUROSEMIDE 40 MG: 40 TABLET ORAL at 08:35

## 2024-06-17 RX ADMIN — PANTOPRAZOLE SODIUM 40 MG: 40 TABLET, DELAYED RELEASE ORAL at 07:21

## 2024-06-17 ASSESSMENT — PAIN DESCRIPTION - DESCRIPTORS: DESCRIPTORS: ACHING

## 2024-06-17 ASSESSMENT — PAIN SCALES - GENERAL
PAINLEVEL_OUTOF10: 0
PAINLEVEL_OUTOF10: 1
PAINLEVEL_OUTOF10: 0

## 2024-06-17 ASSESSMENT — PAIN DESCRIPTION - LOCATION: LOCATION: CHEST

## 2024-06-17 ASSESSMENT — PAIN DESCRIPTION - ORIENTATION: ORIENTATION: ANTERIOR

## 2024-06-18 ENCOUNTER — APPOINTMENT (OUTPATIENT)
Facility: HOSPITAL | Age: 69
DRG: 220 | End: 2024-06-18
Attending: THORACIC SURGERY (CARDIOTHORACIC VASCULAR SURGERY)
Payer: MEDICARE

## 2024-06-18 LAB
ANION GAP SERPL CALC-SCNC: 3 MMOL/L (ref 5–15)
BUN SERPL-MCNC: 23 MG/DL (ref 6–20)
BUN/CREAT SERPL: 32 (ref 12–20)
CALCIUM SERPL-MCNC: 8.2 MG/DL (ref 8.5–10.1)
CHLORIDE SERPL-SCNC: 107 MMOL/L (ref 97–108)
CO2 SERPL-SCNC: 27 MMOL/L (ref 21–32)
CREAT SERPL-MCNC: 0.71 MG/DL (ref 0.55–1.02)
ERYTHROCYTE [DISTWIDTH] IN BLOOD BY AUTOMATED COUNT: 13.8 % (ref 11.5–14.5)
GLUCOSE SERPL-MCNC: 99 MG/DL (ref 65–100)
HCT VFR BLD AUTO: 28.5 % (ref 35–47)
HGB BLD-MCNC: 10.1 G/DL (ref 11.5–16)
HGB BLD-MCNC: 10.2 G/DL (ref 11.5–16)
HGB BLD-MCNC: 10.3 G/DL (ref 11.5–16)
HGB BLD-MCNC: 10.5 G/DL (ref 11.5–16)
HGB BLD-MCNC: 10.7 G/DL (ref 11.5–16)
HGB BLD-MCNC: 11.7 G/DL (ref 11.5–16)
HGB BLD-MCNC: 12.3 G/DL (ref 11.5–16)
HGB BLD-MCNC: 13.4 G/DL (ref 11.5–16)
HGB BLD-MCNC: 9.2 G/DL (ref 11.5–16)
HGB BLD-MCNC: 9.4 G/DL (ref 11.5–16)
HGB BLD-MCNC: 9.7 G/DL (ref 11.5–16)
HGB BLD-MCNC: 9.8 G/DL (ref 11.5–16)
HGB BLD-MCNC: 9.9 G/DL (ref 11.5–16)
HGB BLD-MCNC: 9.9 G/DL (ref 11.5–16)
INR PPP: 1.3 (ref 0.9–1.1)
MCH RBC QN AUTO: 34.3 PG (ref 26–34)
MCHC RBC AUTO-ENTMCNC: 34 G/DL (ref 30–36.5)
MCV RBC AUTO: 100.7 FL (ref 80–99)
NRBC # BLD: 0 K/UL (ref 0–0.01)
NRBC BLD-RTO: 0 PER 100 WBC
PLATELET # BLD AUTO: 153 K/UL (ref 150–400)
PMV BLD AUTO: 10.1 FL (ref 8.9–12.9)
POTASSIUM SERPL-SCNC: 4.1 MMOL/L (ref 3.5–5.1)
PROTHROMBIN TIME: 13 SEC (ref 9–11.1)
RBC # BLD AUTO: 2.83 M/UL (ref 3.8–5.2)
SODIUM SERPL-SCNC: 137 MMOL/L (ref 136–145)
WBC # BLD AUTO: 6.7 K/UL (ref 3.6–11)

## 2024-06-18 PROCEDURE — 85027 COMPLETE CBC AUTOMATED: CPT

## 2024-06-18 PROCEDURE — 2580000003 HC RX 258: Performed by: PHYSICIAN ASSISTANT

## 2024-06-18 PROCEDURE — 97116 GAIT TRAINING THERAPY: CPT

## 2024-06-18 PROCEDURE — 71045 X-RAY EXAM CHEST 1 VIEW: CPT

## 2024-06-18 PROCEDURE — 85610 PROTHROMBIN TIME: CPT

## 2024-06-18 PROCEDURE — 97535 SELF CARE MNGMENT TRAINING: CPT

## 2024-06-18 PROCEDURE — 6370000000 HC RX 637 (ALT 250 FOR IP)

## 2024-06-18 PROCEDURE — 2060000000 HC ICU INTERMEDIATE R&B

## 2024-06-18 PROCEDURE — 6370000000 HC RX 637 (ALT 250 FOR IP): Performed by: PHYSICIAN ASSISTANT

## 2024-06-18 PROCEDURE — 97110 THERAPEUTIC EXERCISES: CPT

## 2024-06-18 PROCEDURE — 80048 BASIC METABOLIC PNL TOTAL CA: CPT

## 2024-06-18 PROCEDURE — 6370000000 HC RX 637 (ALT 250 FOR IP): Performed by: THORACIC SURGERY (CARDIOTHORACIC VASCULAR SURGERY)

## 2024-06-18 PROCEDURE — 36415 COLL VENOUS BLD VENIPUNCTURE: CPT

## 2024-06-18 PROCEDURE — 6370000000 HC RX 637 (ALT 250 FOR IP): Performed by: NURSE PRACTITIONER

## 2024-06-18 RX ORDER — WARFARIN SODIUM 5 MG/1
5 TABLET ORAL
Status: COMPLETED | OUTPATIENT
Start: 2024-06-18 | End: 2024-06-18

## 2024-06-18 RX ADMIN — SODIUM CHLORIDE, PRESERVATIVE FREE 10 ML: 5 INJECTION INTRAVENOUS at 08:02

## 2024-06-18 RX ADMIN — ASPIRIN 81 MG: 81 TABLET, COATED ORAL at 07:59

## 2024-06-18 RX ADMIN — FUROSEMIDE 40 MG: 40 TABLET ORAL at 08:00

## 2024-06-18 RX ADMIN — ACETAMINOPHEN 975 MG: 325 TABLET ORAL at 20:15

## 2024-06-18 RX ADMIN — Medication 1 TABLET: at 07:59

## 2024-06-18 RX ADMIN — POTASSIUM CHLORIDE 10 MEQ: 750 TABLET, FILM COATED, EXTENDED RELEASE ORAL at 08:00

## 2024-06-18 RX ADMIN — ROSUVASTATIN 40 MG: 40 TABLET, FILM COATED ORAL at 17:40

## 2024-06-18 RX ADMIN — PANTOPRAZOLE SODIUM 40 MG: 40 TABLET, DELAYED RELEASE ORAL at 06:48

## 2024-06-18 RX ADMIN — DIPHENHYDRAMINE HYDROCHLORIDE 25 MG: 25 CAPSULE ORAL at 21:16

## 2024-06-18 RX ADMIN — POTASSIUM CHLORIDE 10 MEQ: 750 TABLET, FILM COATED, EXTENDED RELEASE ORAL at 16:15

## 2024-06-18 RX ADMIN — CHLORHEXIDINE GLUCONATE 15 ML: 1.2 RINSE ORAL at 08:02

## 2024-06-18 RX ADMIN — GABAPENTIN 200 MG: 100 CAPSULE ORAL at 08:00

## 2024-06-18 RX ADMIN — SODIUM CHLORIDE, PRESERVATIVE FREE 10 ML: 5 INJECTION INTRAVENOUS at 21:28

## 2024-06-18 RX ADMIN — GABAPENTIN 200 MG: 100 CAPSULE ORAL at 14:02

## 2024-06-18 RX ADMIN — ACETAMINOPHEN 975 MG: 325 TABLET ORAL at 06:48

## 2024-06-18 RX ADMIN — WARFARIN SODIUM 5 MG: 5 TABLET ORAL at 17:41

## 2024-06-18 RX ADMIN — GABAPENTIN 200 MG: 100 CAPSULE ORAL at 21:16

## 2024-06-18 RX ADMIN — SENNOSIDES AND DOCUSATE SODIUM 1 TABLET: 50; 8.6 TABLET ORAL at 08:00

## 2024-06-18 RX ADMIN — CHLORHEXIDINE GLUCONATE 15 ML: 1.2 RINSE ORAL at 21:28

## 2024-06-18 RX ADMIN — ACETAMINOPHEN 975 MG: 325 TABLET ORAL at 11:42

## 2024-06-18 ASSESSMENT — PAIN SCALES - GENERAL
PAINLEVEL_OUTOF10: 0
PAINLEVEL_OUTOF10: 0

## 2024-06-19 ENCOUNTER — APPOINTMENT (OUTPATIENT)
Facility: HOSPITAL | Age: 69
DRG: 220 | End: 2024-06-19
Attending: THORACIC SURGERY (CARDIOTHORACIC VASCULAR SURGERY)
Payer: MEDICARE

## 2024-06-19 LAB
INR PPP: 1.6 (ref 0.9–1.1)
PROTHROMBIN TIME: 15.8 SEC (ref 9–11.1)

## 2024-06-19 PROCEDURE — 6370000000 HC RX 637 (ALT 250 FOR IP)

## 2024-06-19 PROCEDURE — 97110 THERAPEUTIC EXERCISES: CPT

## 2024-06-19 PROCEDURE — 6370000000 HC RX 637 (ALT 250 FOR IP): Performed by: NURSE PRACTITIONER

## 2024-06-19 PROCEDURE — 71045 X-RAY EXAM CHEST 1 VIEW: CPT

## 2024-06-19 PROCEDURE — 36415 COLL VENOUS BLD VENIPUNCTURE: CPT

## 2024-06-19 PROCEDURE — 2580000003 HC RX 258: Performed by: PHYSICIAN ASSISTANT

## 2024-06-19 PROCEDURE — 6370000000 HC RX 637 (ALT 250 FOR IP): Performed by: PHYSICIAN ASSISTANT

## 2024-06-19 PROCEDURE — 85610 PROTHROMBIN TIME: CPT

## 2024-06-19 PROCEDURE — 6370000000 HC RX 637 (ALT 250 FOR IP): Performed by: THORACIC SURGERY (CARDIOTHORACIC VASCULAR SURGERY)

## 2024-06-19 PROCEDURE — 2060000000 HC ICU INTERMEDIATE R&B

## 2024-06-19 RX ORDER — POTASSIUM CHLORIDE 750 MG/1
10 TABLET, FILM COATED, EXTENDED RELEASE ORAL ONCE
Status: COMPLETED | OUTPATIENT
Start: 2024-06-19 | End: 2024-06-19

## 2024-06-19 RX ORDER — FUROSEMIDE 20 MG/1
20 TABLET ORAL ONCE
Status: COMPLETED | OUTPATIENT
Start: 2024-06-19 | End: 2024-06-19

## 2024-06-19 RX ORDER — WARFARIN SODIUM 5 MG/1
5 TABLET ORAL
Status: COMPLETED | OUTPATIENT
Start: 2024-06-19 | End: 2024-06-19

## 2024-06-19 RX ORDER — POTASSIUM CHLORIDE 7.45 MG/ML
10 INJECTION INTRAVENOUS ONCE
Status: DISCONTINUED | OUTPATIENT
Start: 2024-06-19 | End: 2024-06-19

## 2024-06-19 RX ORDER — POTASSIUM CHLORIDE 750 MG/1
20 TABLET, FILM COATED, EXTENDED RELEASE ORAL 2 TIMES DAILY
Status: DISCONTINUED | OUTPATIENT
Start: 2024-06-19 | End: 2024-06-20 | Stop reason: HOSPADM

## 2024-06-19 RX ADMIN — FUROSEMIDE 40 MG: 40 TABLET ORAL at 08:35

## 2024-06-19 RX ADMIN — GABAPENTIN 200 MG: 100 CAPSULE ORAL at 08:34

## 2024-06-19 RX ADMIN — WARFARIN SODIUM 5 MG: 5 TABLET ORAL at 18:04

## 2024-06-19 RX ADMIN — CHLORHEXIDINE GLUCONATE 15 ML: 1.2 RINSE ORAL at 21:18

## 2024-06-19 RX ADMIN — FUROSEMIDE 20 MG: 20 TABLET ORAL at 10:38

## 2024-06-19 RX ADMIN — ACETAMINOPHEN 975 MG: 325 TABLET ORAL at 00:37

## 2024-06-19 RX ADMIN — ACETAMINOPHEN 975 MG: 325 TABLET ORAL at 13:13

## 2024-06-19 RX ADMIN — ASPIRIN 81 MG: 81 TABLET, COATED ORAL at 08:33

## 2024-06-19 RX ADMIN — PANTOPRAZOLE SODIUM 40 MG: 40 TABLET, DELAYED RELEASE ORAL at 06:25

## 2024-06-19 RX ADMIN — ROSUVASTATIN 40 MG: 40 TABLET, FILM COATED ORAL at 18:04

## 2024-06-19 RX ADMIN — POTASSIUM CHLORIDE 10 MEQ: 750 TABLET, FILM COATED, EXTENDED RELEASE ORAL at 10:38

## 2024-06-19 RX ADMIN — GABAPENTIN 200 MG: 100 CAPSULE ORAL at 21:17

## 2024-06-19 RX ADMIN — CHLORHEXIDINE GLUCONATE 15 ML: 1.2 RINSE ORAL at 08:36

## 2024-06-19 RX ADMIN — POTASSIUM CHLORIDE 20 MEQ: 750 TABLET, FILM COATED, EXTENDED RELEASE ORAL at 16:38

## 2024-06-19 RX ADMIN — ACETAMINOPHEN 975 MG: 325 TABLET ORAL at 08:12

## 2024-06-19 RX ADMIN — Medication 1 TABLET: at 08:35

## 2024-06-19 RX ADMIN — SODIUM CHLORIDE, PRESERVATIVE FREE 10 ML: 5 INJECTION INTRAVENOUS at 08:36

## 2024-06-19 RX ADMIN — POTASSIUM CHLORIDE 10 MEQ: 750 TABLET, FILM COATED, EXTENDED RELEASE ORAL at 08:34

## 2024-06-19 RX ADMIN — GABAPENTIN 200 MG: 100 CAPSULE ORAL at 14:21

## 2024-06-19 RX ADMIN — SODIUM CHLORIDE, PRESERVATIVE FREE 10 ML: 5 INJECTION INTRAVENOUS at 21:19

## 2024-06-19 NOTE — CARE COORDINATION
Transition of Care Plan:     RUR: 9%  Prior Level of Functioning: Lives w daughter and son in law. Cane for mobility and owns a RW.   Disposition:  Anticipated d/c 6/19 or 6/20. [facility] and AVS updated.  If SNF or IPR: Date FOC offered: NA  Follow up appointments: Cardiology  DME needed: Owns DME for d/c   Transportation at discharge: Family  IM/IMM Medicare/ letter given:   Is patient a  and connected with VA?               If yes, was  transfer form completed and VA notified?   Caregiver Contact: Dory Hampton [phone]  Discharge Caregiver contacted prior to discharge?   Care Conference needed?   Barriers to discharge:  Medical    CM met w patient and her visitor at bedside to discuss the d/c plan w [facility].  Patient reports they have already reached out to her for a welcome call and she's hoping to d/c at the end of today.  CM will continue to follow for [facility] needs.    Radha Hsieh MSW CCM  Care Management

## 2024-06-20 ENCOUNTER — APPOINTMENT (OUTPATIENT)
Facility: HOSPITAL | Age: 69
DRG: 220 | End: 2024-06-20
Attending: THORACIC SURGERY (CARDIOTHORACIC VASCULAR SURGERY)
Payer: MEDICARE

## 2024-06-20 ENCOUNTER — ANTI-COAG VISIT (OUTPATIENT)
Age: 69
End: 2024-06-20

## 2024-06-20 VITALS
SYSTOLIC BLOOD PRESSURE: 137 MMHG | HEIGHT: 65 IN | BODY MASS INDEX: 38.9 KG/M2 | WEIGHT: 233.47 LBS | DIASTOLIC BLOOD PRESSURE: 67 MMHG | OXYGEN SATURATION: 96 % | RESPIRATION RATE: 16 BRPM | HEART RATE: 93 BPM | TEMPERATURE: 98 F

## 2024-06-20 DIAGNOSIS — Z95.2 S/P MVR (MITRAL VALVE REPLACEMENT): Primary | ICD-10-CM

## 2024-06-20 LAB
ALBUMIN SERPL-MCNC: 3 G/DL (ref 3.5–5)
ALBUMIN/GLOB SERPL: 1 (ref 1.1–2.2)
ALP SERPL-CCNC: 75 U/L (ref 45–117)
ALT SERPL-CCNC: 28 U/L (ref 12–78)
ANION GAP SERPL CALC-SCNC: 4 MMOL/L (ref 5–15)
AST SERPL-CCNC: 28 U/L (ref 15–37)
BILIRUB DIRECT SERPL-MCNC: 0.2 MG/DL (ref 0–0.2)
BILIRUB SERPL-MCNC: 1 MG/DL (ref 0.2–1)
BUN SERPL-MCNC: 26 MG/DL (ref 6–20)
BUN/CREAT SERPL: 41 (ref 12–20)
CALCIUM SERPL-MCNC: 8.4 MG/DL (ref 8.5–10.1)
CHLORIDE SERPL-SCNC: 103 MMOL/L (ref 97–108)
CO2 SERPL-SCNC: 29 MMOL/L (ref 21–32)
CREAT SERPL-MCNC: 0.63 MG/DL (ref 0.55–1.02)
ERYTHROCYTE [DISTWIDTH] IN BLOOD BY AUTOMATED COUNT: 14 % (ref 11.5–14.5)
GLOBULIN SER CALC-MCNC: 2.9 G/DL (ref 2–4)
GLUCOSE SERPL-MCNC: 96 MG/DL (ref 65–100)
HCT VFR BLD AUTO: 30.1 % (ref 35–47)
HGB BLD-MCNC: 10.1 G/DL (ref 11.5–16)
INR PPP: 1.7 (ref 0.9–1.1)
MCH RBC QN AUTO: 33.8 PG (ref 26–34)
MCHC RBC AUTO-ENTMCNC: 33.6 G/DL (ref 30–36.5)
MCV RBC AUTO: 100.7 FL (ref 80–99)
NRBC # BLD: 0 K/UL (ref 0–0.01)
NRBC BLD-RTO: 0 PER 100 WBC
PLATELET # BLD AUTO: 209 K/UL (ref 150–400)
PMV BLD AUTO: 9.9 FL (ref 8.9–12.9)
POTASSIUM SERPL-SCNC: 4.5 MMOL/L (ref 3.5–5.1)
PROT SERPL-MCNC: 5.9 G/DL (ref 6.4–8.2)
PROTHROMBIN TIME: 17.5 SEC (ref 9–11.1)
RBC # BLD AUTO: 2.99 M/UL (ref 3.8–5.2)
SODIUM SERPL-SCNC: 136 MMOL/L (ref 136–145)
WBC # BLD AUTO: 7.2 K/UL (ref 3.6–11)

## 2024-06-20 PROCEDURE — 6370000000 HC RX 637 (ALT 250 FOR IP): Performed by: NURSE PRACTITIONER

## 2024-06-20 PROCEDURE — 6370000000 HC RX 637 (ALT 250 FOR IP): Performed by: PHYSICIAN ASSISTANT

## 2024-06-20 PROCEDURE — 85027 COMPLETE CBC AUTOMATED: CPT

## 2024-06-20 PROCEDURE — 6370000000 HC RX 637 (ALT 250 FOR IP)

## 2024-06-20 PROCEDURE — 36415 COLL VENOUS BLD VENIPUNCTURE: CPT

## 2024-06-20 PROCEDURE — 85610 PROTHROMBIN TIME: CPT

## 2024-06-20 PROCEDURE — 80076 HEPATIC FUNCTION PANEL: CPT

## 2024-06-20 PROCEDURE — 2580000003 HC RX 258: Performed by: PHYSICIAN ASSISTANT

## 2024-06-20 PROCEDURE — 80048 BASIC METABOLIC PNL TOTAL CA: CPT

## 2024-06-20 PROCEDURE — 71045 X-RAY EXAM CHEST 1 VIEW: CPT

## 2024-06-20 PROCEDURE — 97535 SELF CARE MNGMENT TRAINING: CPT

## 2024-06-20 RX ORDER — ACETAMINOPHEN 325 MG/1
975 TABLET ORAL EVERY 6 HOURS
Qty: 120 TABLET | Refills: 3 | Status: SHIPPED | OUTPATIENT
Start: 2024-06-20

## 2024-06-20 RX ORDER — GABAPENTIN 100 MG/1
CAPSULE ORAL
Qty: 39 CAPSULE | Refills: 0 | Status: SHIPPED | OUTPATIENT
Start: 2024-06-20 | End: 2024-06-30

## 2024-06-20 RX ORDER — FUROSEMIDE 20 MG/1
60 TABLET ORAL DAILY
Qty: 15 TABLET | Refills: 0 | OUTPATIENT
Start: 2024-06-21 | End: 2024-06-26

## 2024-06-20 RX ORDER — ACETAMINOPHEN 325 MG/1
975 TABLET ORAL EVERY 6 HOURS
Qty: 120 TABLET | Refills: 3 | OUTPATIENT
Start: 2024-06-20

## 2024-06-20 RX ORDER — LIDOCAINE 4 G/G
2 PATCH TOPICAL DAILY
Qty: 10 EACH | Refills: 0 | OUTPATIENT
Start: 2024-06-21 | End: 2024-06-26

## 2024-06-20 RX ORDER — ASPIRIN 81 MG/1
81 TABLET ORAL DAILY
Qty: 30 TABLET | Refills: 3 | OUTPATIENT
Start: 2024-06-21

## 2024-06-20 RX ORDER — ASPIRIN 81 MG/1
81 TABLET ORAL DAILY
Qty: 30 TABLET | Refills: 3 | Status: SHIPPED | OUTPATIENT
Start: 2024-06-21

## 2024-06-20 RX ORDER — POLYETHYLENE GLYCOL 3350 17 G/17G
17 POWDER, FOR SOLUTION ORAL DAILY PRN
Qty: 7 PACKET | Refills: 0 | OUTPATIENT
Start: 2024-06-20 | End: 2024-06-27

## 2024-06-20 RX ORDER — WARFARIN SODIUM 1 MG/1
1 TABLET ORAL DAILY
Qty: 60 TABLET | Refills: 3 | Status: SHIPPED | OUTPATIENT
Start: 2024-06-20

## 2024-06-20 RX ORDER — PANTOPRAZOLE SODIUM 40 MG/1
40 TABLET, DELAYED RELEASE ORAL
Qty: 30 TABLET | Refills: 3 | Status: SHIPPED | OUTPATIENT
Start: 2024-06-21

## 2024-06-20 RX ORDER — POTASSIUM CHLORIDE 1500 MG/1
20 TABLET, EXTENDED RELEASE ORAL DAILY
Qty: 5 TABLET | Refills: 0 | Status: SHIPPED | OUTPATIENT
Start: 2024-06-20 | End: 2024-06-25

## 2024-06-20 RX ORDER — FUROSEMIDE 20 MG/1
60 TABLET ORAL DAILY
Qty: 15 TABLET | Refills: 0 | Status: SHIPPED | OUTPATIENT
Start: 2024-06-21 | End: 2024-06-26

## 2024-06-20 RX ORDER — WARFARIN SODIUM 5 MG/1
5 TABLET ORAL DAILY
Qty: 30 TABLET | Refills: 3 | Status: SHIPPED | OUTPATIENT
Start: 2024-06-20

## 2024-06-20 RX ORDER — POTASSIUM CHLORIDE 1500 MG/1
20 TABLET, EXTENDED RELEASE ORAL DAILY
Qty: 5 TABLET | Refills: 0 | OUTPATIENT
Start: 2024-06-20 | End: 2024-06-25

## 2024-06-20 RX ORDER — POLYETHYLENE GLYCOL 3350 17 G/17G
17 POWDER, FOR SOLUTION ORAL DAILY PRN
Qty: 7 PACKET | Refills: 0 | Status: SHIPPED | OUTPATIENT
Start: 2024-06-20 | End: 2024-06-27

## 2024-06-20 RX ORDER — LIDOCAINE 4 G/G
2 PATCH TOPICAL DAILY
Qty: 10 EACH | Refills: 0 | Status: SHIPPED | OUTPATIENT
Start: 2024-06-21 | End: 2024-06-26

## 2024-06-20 RX ORDER — LANOLIN ALCOHOL/MO/W.PET/CERES
6 CREAM (GRAM) TOPICAL NIGHTLY PRN
Refills: 3 | COMMUNITY
Start: 2024-06-20

## 2024-06-20 RX ORDER — GABAPENTIN 100 MG/1
CAPSULE ORAL
Qty: 45 CAPSULE | Refills: 0 | OUTPATIENT
Start: 2024-06-20 | End: 2024-07-01

## 2024-06-20 RX ORDER — LANOLIN ALCOHOL/MO/W.PET/CERES
6 CREAM (GRAM) TOPICAL NIGHTLY PRN
Refills: 3 | Status: SHIPPED | COMMUNITY
Start: 2024-06-20

## 2024-06-20 RX ADMIN — CHLORHEXIDINE GLUCONATE 15 ML: 1.2 RINSE ORAL at 08:28

## 2024-06-20 RX ADMIN — FUROSEMIDE 60 MG: 40 TABLET ORAL at 08:23

## 2024-06-20 RX ADMIN — ACETAMINOPHEN 975 MG: 325 TABLET ORAL at 00:13

## 2024-06-20 RX ADMIN — GABAPENTIN 200 MG: 100 CAPSULE ORAL at 08:24

## 2024-06-20 RX ADMIN — ACETAMINOPHEN 975 MG: 325 TABLET ORAL at 12:36

## 2024-06-20 RX ADMIN — ACETAMINOPHEN 975 MG: 325 TABLET ORAL at 07:10

## 2024-06-20 RX ADMIN — PANTOPRAZOLE SODIUM 40 MG: 40 TABLET, DELAYED RELEASE ORAL at 07:11

## 2024-06-20 RX ADMIN — Medication 1 TABLET: at 08:24

## 2024-06-20 RX ADMIN — SODIUM CHLORIDE, PRESERVATIVE FREE 10 ML: 5 INJECTION INTRAVENOUS at 08:26

## 2024-06-20 RX ADMIN — POTASSIUM CHLORIDE 20 MEQ: 750 TABLET, FILM COATED, EXTENDED RELEASE ORAL at 08:24

## 2024-06-20 RX ADMIN — ASPIRIN 81 MG: 81 TABLET, COATED ORAL at 08:23

## 2024-06-20 NOTE — CARE COORDINATION
BINDU    Patient is ready for d/c this date.  CM met w patient at bedside to discuss the d/c plan w [facility] and to review an important message from Medicare. Patient verbalized understanding and gave permission for possible discharge within 4 hours of receiving IMM.  Her daughter will transport home and all questions have been answered.  CM wished patient well.    Sent d/c clinicals including INR order to [facility]. Also called and spoke w Merle in admissions there to let her know patient is d/c today and to review INR order.    ELAINE Zarate CCM  Care Management

## 2024-06-20 NOTE — CARE COORDINATION
Post discharge contact:    [facility] left a message after patient discharged and reported they are not able to accept patient due to a staffing shortage. The [facility] referral has already been closed so CM is unable to send referrals using this platform.  CM called and faxed referrals to [facility] ([phone]), [facility] ([phone]) and [facility] ([phone], [facility] ([phone]).  CM will continue to send referrals until I find an accepting agency.     Update 4:30pm[facility]  [facility] accepted and will see patient tomorrow, 6/21 and do an INR draw.  They have been in touch w patient and patient agreeable to visit tomorrow w the nurse, Disha. CM updated provider via [employer] w this change. CM didn't hear back from the other  agencies that I faxed.       Radha Hsieh, MSW CCM  Care Management

## 2024-06-20 NOTE — PROGRESS NOTES
Physician Progress Note      PATIENT:               MAGDALENA HEATH  CSN #:                  [id number]  :                       1955  ADMIT DATE:       2024 5:32 AM  DISCH DATE:  RESPONDING  PROVIDER #:        Rosita Villatoro NP          QUERY TEXT:    Pt admitted for TAVR. Pt noted to have new pleural effusions on CXR  with   endorsed SOB and fatigue, chronic BLE edema, and crackles. If possible, please   document in progress notes and discharge summary if you are evaluating and/or   treating any of the following:    The medical record reflects the following:  Risk Factors: Hx of AS and MR, HLD, HTN    Clinical Indicators:  H&P:  Endorses SOB (Over the past few years; onset correlates with timing of COVID   vax per patient), LE edema (chronic lymphedema), fatigue (Per daughter- sleepy   in the afternoon), and palpitations (Occasionally has 'twinges\").      CXR :  Bilateral pleural effusions, moderate on the right and small on the left.These  are new from the prior study.    CXR :  Unchanged bibasilar  atelectasis/effusions.     CTS Physical Exam:  Lungs:  Fine crackles both lower lobes, posterior chest.  Extremities:  Chronic BLE  edema. PPP.        Treatment: 20 mg IV Lasix , PO 40 mg Lasix today ordered BID.        Thank you,  JOSE C Mathis,RN  Clinical Documentation  [email or web address]  Ph: [phone]  or via [employer]  Options provided:  -- Acute Diastolic CHF/HFpEF  -- Other - I will add my own diagnosis  -- Disagree - Not applicable / Not valid  -- Disagree - Clinically unable to determine / Unknown  -- Refer to Clinical Documentation Reviewer    PROVIDER RESPONSE TEXT:    Provider is clinically unable to determine a response to this query.  Will obtain echo today    Query created by: Renae Soto on 2024 11:43 AM      Electronically signed by:  Rosita Villatoro NP 2024 11:50 AM          
  Physician Progress Note      PATIENT:               MAGDALENA HEATH  [facility] #:                  [id number]  :                       1955  ADMIT DATE:       2024 5:32 AM  DISCH DATE:  RESPONDING  PROVIDER #:        ANGELINA GRACIA          QUERY TEXT:    Pt admitted for TAVR. Pt noted to have new pleural effusions on CXR  with   endorsed SOB and fatigue, chronic BLE edema, and crackles. Pt also noted to   have \"at least mild pulmonary edema and cardiomegaly\" on  CXR. If   possible, please document in progress notes and discharge summary if you are   evaluating and/or treating any of the following:    The medical record reflects the following:  Risk Factors: Hx of AS and MR, HLD, HTN    Clinical Indicators:  H&P:  Endorses SOB (Over the past few years; onset correlates with timing of COVID   vax per patient), LE edema (chronic lymphedema), fatigue (Per daughter- sleepy   in the afternoon), and palpitations (Occasionally has 'twinges\").      CXR :  Bilateral pleural effusions, moderate on the right and small on the left.These  are new from the prior study.    CXR :  Unchanged bibasilar  atelectasis/effusions.     CTS Physical Exam:  Lungs:  Fine crackles both lower lobes, posterior chest.  Extremities:  Chronic BLE  edema. PPP.    CXR :  Sequelae of CABG and left atrial appendage clip placement.  At least mild pulmonary edema.  Cardiomegaly.     CTS Physical Exam:  Lungs:  Fine crackles both lower lobes, posterior chest.  Extremities:  Chronic BLE  edema (non-pitting). PPP.        Treatment: 20 mg IV Lasix , PO 40 mg Lasix        Thank you,  JOSE C Mathis,RN  Clinical Documentation  [email or web address]  Ph: [phone]  or via [employer]  Options provided:  -- Acute Diastolic CHF/HFpEF  -- Other - I will add my own diagnosis  -- Disagree - Not applicable / Not valid  -- Disagree - Clinically unable to determine / Unknown  -- Refer to Clinical Documentation 
0730: Bedside and Verbal shift change report given to EDISON Waggoner and EDISON Barraza (oncoming nurse) by EDISON Barros (offgoing nurse). Report included the following information Nurse Handoff Report, Index, Intake/Output, MAR, and Recent Results.     0745: Cardiac surgery rounding. Per MD Paul keep gardner and chest tubes. MD Paul turned HR on pacemaker down to 40 bpm. Small slow P waves noticed but no ventriclar contraction. Pacemaker turned back up to 80bpm    0900: MD Mary Ellen at bedside. Obtained consent for pacemaker. RN to pull MAC.     1200: MAC pulled    1239: Keysha, NP updated about low urine output. Per NP give 20mg IV lasix     1252: Per BILLY Pierre RN to transition off insulin drip.     1407: Insulin drip stopped for procedure    1509: Patient transferred out to EP lab. Report given to EDISON Linares. EDISON Rodriguez transported with patient.     1719: Pt back from EP lab, sling and ice applied  VSS    1930: Bedside shift change report given to EDISON Forrester (oncoming nurse) by Rosaline HOGAN and EDISON Barraza (offgoing nurse). Report included the following information Nurse Handoff Report.       
0730: Bedside and Verbal shift change report given to EDISON Waggoner and EDISON Barraza (oncoming nurse) by EDISON Troncoso (offgoing nurse). Report included the following information Nurse Handoff Report, Index, Intake/Output, MAR, and Recent Results.    OOB to chair  Ambulate w/ therapy, no issue  Multi BM this morning    1300: Bedside and Verbal shift change report given to EDISON Brito (oncoming nurse) by EDISON Waggoner and EDISON Barraza (offgoing nurse). Report included the following information Nurse Handoff Report, Index, Intake/Output, MAR, and Recent Results.        
0730: Bedside and Verbal shift change report given to Rosaline, RN and Madi RN (oncoming nurse) by EDISON Barros (offgoing nurse). Report included the following information Nurse Handoff Report, Index, Intake/Output, MAR, and Recent Results.     0745: Cardiac surgery rounding aware of patient being on 0.5 of epi overnight. Per MD Paul stop epi stopped.     1000: Keysha, NP updated about urine output less than 30ml/hr X2 hours. Per NP give 500ml LR bolus over an hour    1049: Tonio stopped    1116: Patient up in recliner by PT. MAPs dropped to low 50s when standing. Tonio restarted. See MAR for titrations.      1200: Keysha, NP updated about patient CVP less than 10, urine output less than 20 and needing to go back on tonio. Per NP give one 250 5% albumin bottle.     1320: Keysha, NP updated on tonio needs and CVP. Per NP give albumin and continuous LR fluids.     1758: MD Honey updated about low urine output and high NOAM of 1.98. Per MD give 250ml albumin.     1930: Bedside and Verbal shift change report given to Papo HOGAN (oncoming nurse) by EDISON Waggoner and EDISON Barraza (offgoing nurse). Report included the following information Nurse Handoff Report, Index, Intake/Output, MAR, and Recent Results.     
0740 - Shift handoff obtained from Kathryn HOGAN (off-going) at this time. Patient resting quietly in bed watching TV. Respirations even and unlabored. Denies any needs at this time. Call light within reach.    1250 - Discharge instructions reviewed with patient and patient's daughter Dory. Both verbalized understanding of instructions and all questions were answered.    1315 - Patient discharged via wheelchair to private car at this time. Patient remains A&O x 4, respirations even and unlabored.    Plan of Care:  Problem: Pain  Goal: Verbalizes/displays adequate comfort level or baseline comfort level  6/20/2024 0937 by Aisha Stone RN  Outcome: Progressing  6/19/2024 2314 by Kathryn Schneider RN  Outcome: Progressing  Flowsheets (Taken 6/19/2024 2000)  Verbalizes/displays adequate comfort level or baseline comfort level: Encourage patient to monitor pain and request assistance     Problem: Safety - Adult  Goal: Free from fall injury  6/20/2024 0937 by Aisha Stone RN  Outcome: Progressing  6/19/2024 2314 by Kathryn Schneider RN  Outcome: Progressing  Flowsheets (Taken 6/19/2024 2314)  Free From Fall Injury: Instruct family/caregiver on patient safety     Problem: Safety - Medical Restraint  Goal: Remains free of injury from restraints (Restraint for Interference with Medical Device)  Description: INTERVENTIONS:  1. Determine that other, less restrictive measures have been tried or would not be effective before applying the restraint  2. Evaluate the patient's condition at the time of restraint application  3. Inform patient/family regarding the reason for restraint  4. Q2H: Monitor safety, psychosocial status, comfort, nutrition and hydration  Outcome: Progressing     Problem: Discharge Planning  Goal: Discharge to home or other facility with appropriate resources  6/20/2024 0937 by Aisha Stone RN  Outcome: Progressing  6/19/2024 2314 by Kathryn Schneider RN  Outcome: Progressing  Flowsheets (Taken 6/19/2024 
0800 Bedside shift change report given to Rocio RN (oncoming nurse) by Mitzy HOGAN (offgoing nurse). Report included the following information Nurse Handoff Report.     Cardiac Surgery rounding- plan to de-line and transfer to CVSU. Optimize mobility. Initiate warfarin today.    1015 PT/OT working with patient.    1030 PO lasix with potassium repletion ordered.     1035 Epicardial wires pulled by MICHOACANO Buckley. Obtain VS q15min for 1 hour, pt to remain in bed for 1 hour.     1230 Chest tubes removed by MICHOACANO Buckley. Transfer orders received.    1600 Patient has not voided since gadrner removed- bladderscan revealed 531cc- straight cathed.    1930 Bedside shift change report given to Mitzy HOGAN (oncoming nurse) by Rocio HOGAN (offgoing nurse). Report included the following information Nurse Handoff Report.     
0800 Bedside shift change report given to Rocio RN (oncoming nurse) by Mitzy RN (offgoing nurse). Report included the following information Nurse Handoff Report.     0900 PT working with patient.    1030 PA and LAT XR completed.    1200 2D Echo in process at bedside.      1930 Bedside shift change report given to Mitzy HOGAN (oncoming nurse) by Rocio RN (offgoing nurse). Report included the following information Nurse Handoff Report.     
0800 Bedside shift change report given to Rocio RN (oncoming nurse) by Mitzy RN (offgoing nurse). Report included the following information Nurse Handoff Report.     0930 Cardiac Surgery rounding- continue to trend INR until therapeutic; awaiting bed on CVSU.    1930 Bedside shift change report given to Karyna HOGAN (oncoming nurse) by Rocio RN (offgoing nurse). Report included the following information Nurse Handoff Report.     
1200- report received. Patient in chair without complaints.    1910- patient ambulated from CVI through CVSU and back to room. Tolerated well, VSS.     1945- Bedside and Verbal shift change report given to Sulma HOGAN (oncoming nurse) by Daryl HOGAN (offgoing nurse). Report included the following information Nurse Handoff Report, Adult Overview, Surgery Report, Recent Results, and Cardiac Rhythm VPaced .     
1707- pt arrive to unit. Labs drawn. K 3.8. PRN order to replete. ABG performed. Lowered O2 on vent to 50 from 60    1718- X-Ray at bedside. Results discussed with RUMA Servin.    1738- Daughter at bedside. O2 on vent decreased to 40.     1800- Precedex weaned to allow for awakening    1830- Titrate Tonio to accommodate blood pressure. 2x Albumin given.    1915- Patient able to follow commands. Halved respiratory rate on vent from 14 to 7.     2000- Report given to Papo  
1930: Bedside and Verbal shift change report given to EDISON Jordan (oncoming nurse) by EDISON Barba (offgoing nurse). Report included the following information Nurse Handoff Report, Index, Adult Overview, Intake/Output, MAR, Recent Results, and Cardiac Rhythm   .      0730: Bedside and Verbal shift change report given to EDISON Jordan (oncoming nurse) by EDISON Barba (offgoing nurse). Report included the following information Nurse Handoff Report, Index, Adult Overview, Intake/Output, MAR, Recent Results, and Cardiac Rhythm   .        Problem: Pain  Goal: Verbalizes/displays adequate comfort level or baseline comfort level  6/18/2024 2013 by Jameson Oates RN  Outcome: Progressing  6/18/2024 1347 by Nikki Villatoro RN  Outcome: Progressing     Problem: Safety - Adult  Goal: Free from fall injury  6/18/2024 2013 by Jameson Oates RN  Outcome: Progressing  6/18/2024 1347 by Nikki Villatoro RN  Outcome: Progressing     Problem: Safety - Medical Restraint  Goal: Remains free of injury from restraints (Restraint for Interference with Medical Device)  Description: INTERVENTIONS:  1. Determine that other, less restrictive measures have been tried or would not be effective before applying the restraint  2. Evaluate the patient's condition at the time of restraint application  3. Inform patient/family regarding the reason for restraint  4. Q2H: Monitor safety, psychosocial status, comfort, nutrition and hydration  6/18/2024 2013 by Jameson Oates RN  Outcome: Progressing  6/18/2024 1347 by Nikki Villatoro RN  Outcome: Progressing     Problem: Discharge Planning  Goal: Discharge to home or other facility with appropriate resources  6/18/2024 2013 by Jameson Oates RN  Outcome: Progressing  6/18/2024 1347 by Nikki Villatoro RN  Outcome: Progressing     Problem: Skin/Tissue Integrity  Goal: Absence of new skin breakdown  Description: 1.  Monitor for areas of redness and/or skin breakdown  2.  Assess vascular access sites 
1930: Bedside and Verbal shift change report given to EDISON Troncoso (oncoming nurse) by EDISON Chan (offgoing nurse). Report included the following information Nurse Handoff Report, Intake/Output, MAR, Recent Results, and Cardiac Rhythm Vpaced .      0700: Pt had uneventful shift.     0730: Bedside and Verbal shift change report given to EDISON Barraza/ EDISON Waggoner (oncoming nurse) by EDISON Troncoso (offgoing nurse). Report included the following information Nurse Handoff Report, Intake/Output, MAR, Recent Results, and Cardiac Rhythm Vpaced .    
2000: Bedside and Verbal shift change report given to   EDISON Forrester (oncoming nurse) by EDISON Chan (offgoing nurse). Report included the following information Nurse Handoff Report, Index, and Recent Results.     0700: Patient had uneventful shift.    0800: Bedside and Verbal shift change report given to EDISON Chan (oncoming nurse) by EDISON Forrester (offgoing nurse). Report included the following information Nurse Handoff Report, Index, Intake/Output, and Recent Results.         
2000: Bedside and Verbal shift change report given to Papo RN  (oncoming nurse) by Rosaline HOGAN and Madi RN  (offgoing nurse). Report included the following information Nurse Handoff Report, Index, Adult Overview, Intake/Output, MAR, Recent Results, Cardiac Rhythm Paced , Pre Procedure Checklist, Neuro Assessment, and Event Log.        0050:  NOAM 3.60. MD notified..   
2000: Bedside and Verbal shift change report given to Papo RN (by Juan Alberto RN and Florence Rn  . Report included the following information Nurse Handoff Report, Adult Overview, Surgery Report, Intake/Output, MAR, Recent Results, Med Rec Status, Cardiac Rhythm Paced. Asystole underneath , Alarm Parameters, Quality Measures, Neuro Assessment, and Event Log.    Rate verify, assessment performed. Pt able to follow commands and move extremities equally, but it drowsy.     2030: pt switched to Cpap 10/5 by MD.     2045: Cpap 8/5    2100: Cpap 6/5    2116: K 4.8, Crea .75, Bun 21, H&H 11.2 & 32.6, plt 84.     2130: ABG performed.  7.36/ 47.3/ 146.1/ 26.7/ .08/ 99.1. Ca 1.28. MD Notified. Given the OK to extubate.     2145: RT at bedside to extubate. No cuff Leak noted. MD notified. Extubation held.     2200: MD at bedside to assess. Verbal order received for 125 mg of Solumedrol, then extubate after 4 hours. Precedex restarted     2246: 125 mg of Solumedrol given.     0050: NOAM .86    0200: MD at bedside. Airway Cart at beside for possible reintubation.    0210: pt extubated to 4L NC .MD at bedside.    0442: 1 albumin given    0600: noam .6 MD notified     0750: Hand off given to Rosaline HOGAN and Madi RN   
4 Eyes Skin Assessment     NAME:  Blanche Mars  YOB: 1955  MEDICAL RECORD NUMBER:  [id number]    The patient is being assessed for  Post-Op Surgical    I agree that at least one RN has performed a thorough Head to Toe Skin Assessment on the patient. ALL assessment sites listed below have been assessed.      Areas assessed by both nurses:    Head, Face, Ears, Shoulders, Back, Chest, Arms, Elbows, Hands, Sacrum. Buttock, Coccyx, Ischium, Legs. Feet and Heels, and Under Medical Devices         Does the Patient have a Wound? No noted wound(s)       Jose Prevention initiated by RN: Yes  Wound Care Orders initiated by RN: No    Pressure Injury (Stage 3,4, Unstageable, DTI, NWPT, and Complex wounds) if present, place Wound referral order by RN under : No    New Ostomies, if present place, Ostomy referral order under : No     Nurse 1 eSignature: Electronically signed by IDANIA SOLIS RN on 6/11/24 at 8:12 PM EDT    **SHARE this note so that the co-signing nurse can place an eSignature**    Nurse 2 eSignature: Electronically signed by JEANINE HENRY RN on 6/11/24 at 8:12 PM EDT   
Cardiac Cath Lab Procedure Area Arrival Note:    Blanche Mars arrived to Cardiac Cath Lab, Procedure Area. Patient identifiers verified with NAME and DATE OF BIRTH. Procedure verified with patient. Consent forms verified. Allergies verified. Patient informed of procedure and plan of care. Questions answered with review. Patient voiced understanding of procedure and plan of care.    Patient on cardiac monitor, non-invasive blood pressure, SPO2 monitor. On inpatient bed to room 3 from CVICU for dual PPI with conscious sedation. or placed on O2 @ 4 lpm via NC.  IV of  ml on pump at KVO ml/hr. Patient status doing well without problems. Patient is A&Ox 4. Patient reports no complaints.     Patient medicated during procedure with orders obtained and verified by Dr. Hyde.    Refer to patients Cardiac Cath Lab PROCEDURE REPORT for vital signs, assessment, status, and response during procedure, printed at end of case. Printed report on chart or scanned into chart.   
Cardiac Surgery Care Coordinator-  Met with Blanche Mars, reviewed plan of care and discharge instructions. Reinforced move in the tube, sternal precautions and continued use of the incentive spirometer. Reviewed the importance of daily temp and weight monitoring, discussed incisional care and reviewed signs and symptoms of infection.  Red reminder bracelet on left wrist, reviewed purpose of the bracelet and when to call the MD. Using the teach back method reviewed new medications to include the name, purpose and possible side effects of acetaminophen, aspirin, furosemide gabapentin,lidocaine, melatonin, pantoprazole glycolax ( if needed) potassium chloride and warfarin.  Reminded pt of appts and encouraged participation in the Cardiac Wellness and rehab program after discharge.  Encouraged Blanche Mars to verbalize and emotional support given. Previously provided her with her valve ID and dental prophylaxis cards.  Blanche Mars is without questions or concerns at this time. Will follow up with a phone call after discharge  
Cardiac Surgery Care Coordinator-  Met with Blanche Mars. Reviewed plan of care and discussed goals for the day. Blanche Mars has a good understanding of her plan for the day.  Reinforced sternal precautions and encouraged continued use of the incentive spirometer.  Blanche Mars can pull 1000ml with good effort. Discussed possible discharge date and encouraged Blanche Mars to verbalize.   Will continue to follow for educational and emotional needs.  
Cardiac Surgery Care Coordinator- Met with Blanche Mars and her daughter, reviewed plan of care and discussed upcoming discharge date.  Reinforced sternal precautions and encouraged continued use of the incentive spirometer. Began discharge teaching and encouraged them to verbalize. Assisted Ms Mars with ambulation in the prieto. Pre ambulation O2 Sat 97% post ambulation was 98%. She tolerated activity well.   Reviewed goals for the day and discussed the  importance of increased activity and continued activity after discharge.  Reviewed importance of wearing the red reminder bracelet and when to call MD. Will continue to follow for educational and emotional needs.  
Cardiac Surgery Care Coordinator- Met with Blanche Mars and her daugther, reviewed plan of care and discussed plan for PPM later today. Reinforced sternal precautions and encouraged continued use of the incentive spirometer. Reviewed goals for the day and emphasized the importance of increased activity to meet discharge goals. Will continue to follow for educational and emotional needs.  
Cardiac Surgery Care Coordinator- Met with Blanche Mars reviewed plan of care and discussed upcoming discharge plan.  Reinforced sternal precautions and encouraged continued use of the incentive spirometer. Began discharge teaching and encouraged her to verbalize. Reinforced Coumadin education, she is without questions.  Reviewed goals for the day and discussed the  importance of increased activity and continued activity after discharge.  Reviewed importance of wearing the red reminder bracelet and when to call MD. Will continue to follow for educational and emotional needs.  
Cardiac Surgery Care Coordinator- Met with Blanche Mars, reviewed plan of care and discussed potential discharge date. Reinforced sternal precautions and encouraged continued use of the incentive spirometer. Reviewed goals for the day and emphasized the importance of increased activity to meet discharge goals. Will continue to follow for educational and emotional needs.  
Cardiac Surgery Care Coordinator- Met with Blanche Mars, reviewed plan of care and discussed upcoming discharge plan, Reinforced sternal precautions and encouraged continued use of the incentive spirometer. Began discharge teaching and encouraged her to verbalize. Provided her with her valve ID and dental prophylaxis cards. Discussed the purpose of both cards. Pacemaker ID card located in the device box. Provided her with the Coumadin educational pamphlet, began Coumadin education to include INR goal, testing and dosing schedule.  Reviewed goals for the day and discussed the  importance of increased activity and continued activity after discharge. Red reminder bracelet placed on left wrist. Reviewed importance of wearing the red reminder bracelet and when to call MD. Will continue to follow for educational and emotional needs.  
Cardiac Surgery Coordinator- Met with the daughter of  Blanche Mars, introduced role of the Cardiac Surgery Care Coordinator.  Reviewed plan of care and day of surgery expectations. Provided her with an update from OR.  Encouraged her to verbalize and emotional support given.  Will continue to update throughout the day.    1020 Unable to locate the daughter for an update    1300-Met with the daughter of Blanche Mars,  Provided her with an update from OR.  Will continue to update throughout the day.    1440 Met with the daughter of Blanche Mars, provided update from the OR. Reviewed post op plan of care and encouraged her to verbalize. Exchanged contact information and offered emotional support. She will be waiting for Dr Miller in the surgical waiting area.   
Charting and patient care of Blanche Mars by main Waggoner RN from 0730 to 2000 was supervised and reviewed by this RN.   
Charting and patient care of Blanche Mars by main Waggoner RN from 0730 to 2000 was supervised and reviewed by this RN.    
[facility] ICU Progress Note    Admit Date: 2024  POD: 8    Procedure:  Procedure(s):  1. Tissue Mitral Valve Replacement #25 Linton Mitris.  2. Epiaortic Ultrasound.  3. Ligation of AMARA with 50 mm clip.  4. Septal myectomy via transaortic approach.      with Dr. Hyde:  Implantation of a Medtronic dual chamber Pacemaker.     Admission synopsis:   POD0: Arrived to ICU on precedex, insulin, katie and low-dose epi.   POD1: Extubated 0200. Complete heart block. Weaned off epi, katie remains.   POD2: PPM placed by Dr. Hyde. Midodrine initiated, katie weaned off.   POD3: Epicardial wires pulled, chest tube removed. Midodrine discontinued. PO diuresis. Transfer orders. Initiate coumadin.   POD6: Awaiting INR to bump, remains 1.1 this AM. Lasix BID today   POD7: INR 1.3.   POD8: INR 1.6. CXR unimproved. Lasix 40 to 60 mg, K 10-20 mEq     Subjective/24hr events:   Pt seen with Dr. Saldivar. In chair, looks good. V-paced 90. On RA, afebrile.       Objective:   Vitals:  Blood pressure (!) 146/74, pulse 88, temperature 98.2 °F (36.8 °C), temperature source Oral, resp. rate 29, height 1.651 m (5' 5\"), weight 107.4 kg (236 lb 12.4 oz), SpO2 97 %, not currently breastfeeding.  Temp (24hrs), Av.2 °F (36.8 °C), Min:98 °F (36.7 °C), Max:98.3 °F (36.8 °C)      EKG/Rhythm:  Cardiac Rhythm: Ventricular paced    Extubation Date / Time: 24     Oxygen Therapy: RA    CXR:    Xray Result (most recent):  XR CHEST PORTABLE 2024    Narrative  EXAM: XR CHEST PORTABLE    INDICATION: Post op open heart surgery    COMPARISON: 2024    FINDINGS: A portable AP radiograph of the chest was obtained at 422 hours. Left  subclavian pacemaker. Median sternotomy changes.. Mild pulmonary edema.. Small  bilateral pleural effusions unchanged..  The bones and soft tissues are grossly  within normal limits.    Impression  Mild pulmonary edema and small bilateral pleural effusions  unchanged.    Electronically signed 
Critical Care Medicine Plan of Care    Pt with continued clinical improvement, off vasoactives. Please do not hesitate to contact Intensivist service if there is a clinical question, or if pt with acute change in clinical status.     Aleksander Méndez MD  Critical Care Medicine       
EP/Cath LAB to Recovery Room Report    Procedure: Dual chamber PPI    MD: SHADE Hyde MD    Verbal Report given to Madi in CVICU on patient being transferred to CVICU for routine post-op. Patient stable upon transfer to unit.  Pt had IV conscious sedation with procedural RN. Vitals, mental status, MAR, procedural summary discussed with Madi RN.     Conscious sedation medication given by Procedural RN:  Versed 3 mg  Fentanyl 50 mcg    Procedural Site:    Incision site made to left chest.         Pacemaker Mode set to DDDR .     
[facility] ICU Progress Note    Admit Date: 2024  POD:  5    Procedure:  Procedure(s):  1. Tissue Mitral Valve Replacement #25 Linton Mitris.  2. Epiaortic Ultrasound.  3. Ligation of AMARA with 50 mm clip.  4. Septal myectomy via transaortic approach.      with Dr. Hyde:  Implantation of a Medtronic dual chamber Pacemaker.     Admission synopsis:   POD0: Arrived to ICU on precedex, insulin, katie and low-dose epi.   POD1: Extubated 0200. Complete heart block. Weaned off epi, katie remains.   POD2: PPM placed by Dr. Hyde. Midodrine initiated, katie weaned off.   POD3: Epicardial wires pulled, chest tube removed. Midodrine discontinued. PO diuresis. Transfer orders. Initiate coumadin.   Subjective/24hr events:   Pt seen with Dr. Saunders, . V-paced 60s-70s. On RA, afebrile.     Objective:   Vitals:  Blood pressure (!) 123/59, pulse 60, temperature 98.3 °F (36.8 °C), temperature source Oral, resp. rate 19, height 1.651 m (5' 5\"), weight 109.6 kg (241 lb 10 oz), SpO2 97 %, not currently breastfeeding.  Temp (24hrs), Av.3 °F (36.8 °C), Min:98.1 °F (36.7 °C), Max:98.5 °F (36.9 °C)      EKG/Rhythm:  Cardiac Rhythm: Ventricular paced    Extubation Date / Time: 24     CT Output:d/c    Oxygen Therapy: RA    CXR:  Xray Result (most recent):  XR CHEST PORTABLE 2024    Narrative  EXAM:  XR CHEST PORTABLE    INDICATION:   Post op open heart surgery    COMPARISON: 6/15/2024 radiograph.    TECHNIQUE: Portable frontal view radiograph of the chest was acquired.    FINDINGS:  Median sternotomy wires with mediastinal CABG clips and left atrial appendage  clip, similar to prior study.  Implantable cardiac pacemaker leads project over the right atrium and right  ventricle from battery pack overlying the left axillary region.  Lungs: Prominent pulmonary vascular markings with at least mild pulmonary edema.  Pleura: No pleural effusion or pneumothorax.  Mediastinum: Enlarged cardiac silhouette, increased since 
[facility] ICU Progress Note    Admit Date: 2024  POD:  6    Procedure:  Procedure(s):  1. Tissue Mitral Valve Replacement #25 Linton Mitris.  2. Epiaortic Ultrasound.  3. Ligation of AMARA with 50 mm clip.  4. Septal myectomy via transaortic approach.      with Dr. Hyde:  Implantation of a Medtronic dual chamber Pacemaker.     Admission synopsis:   POD0: Arrived to ICU on precedex, insulin, katie and low-dose epi.   POD1: Extubated 0200. Complete heart block. Weaned off epi, katie remains.   POD2: PPM placed by Dr. Hyde. Midodrine initiated, katie weaned off.   POD3: Epicardial wires pulled, chest tube removed. Midodrine discontinued. PO diuresis. Transfer orders. Initiate coumadin.   POD6: Awaiting INR to bump, remains 1.1 this AM.   Subjective/24hr events:   Pt seen with Dr. Miller. In chair, looks good. V-paced 90. On RA, afebrile.    BM x 3    Objective:   Vitals:  Blood pressure (!) 149/79, pulse 91, temperature 99 °F (37.2 °C), temperature source Oral, resp. rate 24, height 1.651 m (5' 5\"), weight 109.6 kg (241 lb 10 oz), SpO2 94 %, not currently breastfeeding.  Temp (24hrs), Av.4 °F (36.9 °C), Min:98 °F (36.7 °C), Max:99 °F (37.2 °C)      EKG/Rhythm:  Cardiac Rhythm: Ventricular paced    Extubation Date / Time: 24     Oxygen Therapy: RA    CXR:  Xray Result (most recent):  XR CHEST PORTABLE 2024    Narrative  EXAM:  XR CHEST PORTABLE    INDICATION:   Post op open heart surgery    COMPARISON: 6/15/2024 radiograph.    TECHNIQUE: Portable frontal view radiograph of the chest was acquired.    FINDINGS:  Median sternotomy wires with mediastinal CABG clips and left atrial appendage  clip, similar to prior study.  Implantable cardiac pacemaker leads project over the right atrium and right  ventricle from battery pack overlying the left axillary region.  Lungs: Prominent pulmonary vascular markings with at least mild pulmonary edema.  Pleura: No pleural effusion or 
[facility] ICU Progress Note    Admit Date: 2024  POD:  3    Procedure:  Procedure(s):  1. Tissue Mitral Valve Replacement #25 Linton Mitris.  2. Epiaortic Ultrasound.  3. Ligation of AMARA with 50 mm clip.  4. Septal myectomy via transaortic approach.      with Dr. Hyde:  Implantation of a Medtronic dual chamber Pacemaker.     Admission synopsis:   POD0: Arrived to ICU on precedex, insulin, katie and low-dose epi.   POD1: Extubated 0200. Complete heart block. Weaned off epi, katie remains.   POD2: PPM placed by Dr. Hyde. Midodrine initiated, katie weaned off.   POD3: Epicardial wires pulled, chest tube removed. Midodrine discontinued. PO diuresis. Transfer orders. Initiate coumadin.   Subjective/24hr events:   Pt seen with Dr. Saunders, . V-paced 60s-70s. On RA, afebrile.    BM x 3    Objective:   Vitals:  Blood pressure (!) 122/59, pulse 60, temperature 98.3 °F (36.8 °C), temperature source Oral, resp. rate 17, height 1.651 m (5' 5\"), weight 109.9 kg (242 lb 3.2 oz), SpO2 94 %, not currently breastfeeding.  Temp (24hrs), Av.3 °F (36.8 °C), Min:98 °F (36.7 °C), Max:98.6 °F (37 °C)      EKG/Rhythm:  Cardiac Rhythm: Ventricular paced    Extubation Date / Time: 24     CT Output:d/c    Oxygen Therapy: RA    CXR:  Xray Result (most recent):  XR CHEST PORTABLE 2024    Narrative  INDICATION: Post op open heart surgery    EXAMINATION:  AP CHEST, PORTABLE    COMPARISON: 2024    FINDINGS: Single AP portable view of the chest demonstrates interval removal of  right IJ line with no pneumothorax. The cardiomediastinal silhouette is  unchanged. Left subclavian pacemaker is unchanged. Bibasilar  atelectasis/effusions.    Impression  Removal of right IJ line with no pneumothorax. Unchanged bibasilar  atelectasis/effusions.    Electronically signed by Heriberto Grady          Admission Weight: Last Weight   Weight - Scale: 101.8 kg (224 lb 6.4 oz) Weight - Scale: 109.9 kg (242 lb 3.2 oz)     Intake / 
NUTRITION  Reason for Assessment: LOS      Recommendations/Interventions/Plan:   Continue current regular texture diet, add 3 CHO/meal restriction and Lactose controlled per pt preference  Adjust ONS to Ensure HP 1x/d      Will rescreen per policy       Past Medical History:   Diagnosis Date    Anxiety     Benign heart murmur     Hypertension     Lymphedema     bilateral LE    Mitral valve stenosis     RA (rheumatoid arthritis) (HCC)          Pt screened for LOS.  Chart/labs/meds reviewed.  Admitted with Aortic stenosis [I35.0]  Mitral regurgitation [I34.0]  Aortic stenosis, severe [I35.0]  Clinical course significant for:  (6/11) mitral valve repair, remained in ICU until 6/18  (6/13) pacemaker placement     Pt reports following a very-low CHO diet at home for her arthritis as she believes this diet is antiinflammatory and will help her with insulin resistance. Per Admission nutrition screen, pt follows a \"keto diet\", this was never entered into diet order. Pt did not seem interested in diet edu at this time, RD encouraged her to continue searching for nutrition information using trusted resources. Pt agreeable to adding CHO restriction to diet and changing ONS to low luna/high protein for healing (per I/O and pt, she has not been consuming ONS d/t sugar content). Otherwise, appears pt wt stable/gain pta (possibly lymphedema related). Intake IP generally good, >50% of meals. No further nutrition concerns         Nutrition Related Findings:   Edema: Generalized      RUE Edema: Non-pitting  LUE Edema: Non-pitting  RLE Edema: +2  LLE Edema: +2      Last BM: 06/19/24      Skin: Sternum incision       Current Nutrition Therapies:  Diet: Regular  Supplements: Ensure Plus HP 2x/d  Meal Intake:   Patient Vitals for the past 168 hrs:   PO Meals Eaten (%)   06/19/24 1100 76 - 100%   06/18/24 1500 76 - 100%   06/17/24 1840 51 - 75%   06/17/24 1749 51 - 75%   06/17/24 1402 26 - 50%   06/17/24 0800 51 - 75%   06/16/24 1806 26 - 
Pharmacist Note - Warfarin Dosing  Consult provided for this 69 y.o.female to manage warfarin for tissue mitral valve replacement (6/11/24)  -6/13 Implantation of a Medtronic dual chamber Pacemaker.  INR Goal: 2 - 3    Home regimen: new start    Drugs that may increase INR: None  Drugs that may decrease INR: None  Other medications that may increase bleeding risk: Aspirin  Risk factors: Age > 65  Daily INR ordered through: 7/31/24    Recent Labs     06/15/24  0544 06/16/24  0456 06/16/24  0756 06/17/24  0448   HGB 9.5* 9.7*  --  9.3*   INR 1.1  --  1.1 1.1     Date               INR                  Dose  6/11  1.3  -   6/14  1.1  2 mg    6/15  1.1  3 mg    6/16  1.1  5 mg    6/17  1.1  5 mg                                                                                Assessment/ Plan:  Will order warfarin 5 mg PO x 1 dose.    Pharmacy will continue to monitor daily and adjust therapy as indicated.  Please contact the pharmacist at [phone] for outpatient recommendations if needed.         
Pharmacist Note - Warfarin Dosing  Consult provided for this 69 y.o.female to manage warfarin for tissue mitral valve replacement (6/11/24)  -6/13 Implantation of a Medtronic dual chamber Pacemaker.  INR Goal: 2 - 3    Home regimen: new start    Drugs that may increase INR: None  Drugs that may decrease INR: None  Other medications that may increase bleeding risk: Aspirin  Risk factors: Age > 65  Daily INR ordered through: 7/31/24    Recent Labs     06/16/24  0456 06/16/24  0756 06/17/24  0448 06/18/24  0328   HGB 9.7*  --  9.3* 9.7*   INR  --  1.1 1.1 1.3*     Date               INR                  Dose  6/11  1.3  -   6/14  1.1  2 mg    6/15  1.1  3 mg    6/16  1.1  5 mg    6/17  1.1  5 mg  6/18  1.3  5 mg                                                                                 Assessment/ Plan:  Will order warfarin 5 mg PO x 1 dose.    Pharmacy will continue to monitor daily and adjust therapy as indicated.  Please contact the pharmacist at [phone] for outpatient recommendations if needed.           
Pharmacist Note - Warfarin Dosing  Consult provided for this 69 y.o.female to manage warfarin for tissue mitral valve replacement (6/11/24)  -6/13 Implantation of a Medtronic dual chamber Pacemaker.  INR Goal: 2 - 3    Home regimen: new start    Drugs that may increase INR: None  Drugs that may decrease INR: None  Other medications that may increase bleeding risk: Aspirin  Risk factors: Age > 65  Daily INR ordered through: 7/31/24    Recent Labs     06/17/24  0448 06/18/24  0328 06/19/24  0044   HGB 9.3* 9.7*  --    INR 1.1 1.3* 1.6*     Date               INR                  Dose  6/11  1.3  -   6/14  1.1  2 mg    6/15  1.1  3 mg    6/16  1.1  5 mg    6/17  1.1  5 mg  6/18  1.3  5 mg    6/19  1.6  5 mg                                                                               Assessment/ Plan:  Will order warfarin 5 mg PO x 1 dose.    Pharmacy will continue to monitor daily and adjust therapy as indicated.  Please contact the pharmacist at [phone] for outpatient recommendations if needed.             
Pharmacist Note - Warfarin Dosing  Consult provided for this 69 y.o.female to manage warfarin for tissue mitral valve replacement.    INR Goal: 2 - 3    Home regimen: new start    Drugs that may increase INR: None  Drugs that may decrease INR: None  Other medications that may increase bleeding risk: Aspirin  Risk factors: Age > 65  Daily INR ordered through: 7/31/24    Recent Labs     06/13/24  0348 06/14/24  0415 06/14/24  0919 06/15/24  0544   HGB 9.5* 9.3*  --  9.5*   INR  --   --  1.1 1.1     Date               INR                  Dose  6/11  1.3  -   6/14  1.1  2 mg    6/15  1.1  3 mg                                                                                 Assessment/ Plan:  Will order warfarin 3 mg PO x 1 dose.    Pharmacy will continue to monitor daily and adjust therapy as indicated.  Please contact the pharmacist at [phone] for outpatient recommendations if needed.     
Pharmacist Note - Warfarin Dosing  Consult provided for this 69 y.o.female to manage warfarin for tissue mitral valve replacement.    INR Goal: 2 - 3    Home regimen: new start    Drugs that may increase INR: None  Drugs that may decrease INR: None  Other medications that may increase bleeding risk: Aspirin  Risk factors: Age > 65  Daily INR ordered through: 7/31/24    Recent Labs     06/14/24  0415 06/14/24  0919 06/15/24  0544 06/16/24  0456 06/16/24  0756   HGB 9.3*  --  9.5* 9.7*  --    INR  --  1.1 1.1  --  1.1     Date               INR                  Dose  6/11  1.3  -   6/14  1.1  2 mg    6/15  1.1  3 mg    6/16  1.1  5 mg                                                                                 Assessment/ Plan:  Will order warfarin 5 mg PO x 1 dose.    Pharmacy will continue to monitor daily and adjust therapy as indicated.  Please contact the pharmacist at [phone] for outpatient recommendations if needed.       
Pharmacist Note - Warfarin Dosing  Consult provided for this 69 y.o.female to manage warfarin for tissue mitral valve replacement.    INR Goal: 2 - 3    Home regimen: new start    Drugs that may increase INR: None  Drugs that may decrease INR: None  Other medications that may increase bleeding risk: None  Risk factors: Age > 65  Daily INR ordered through: 7/31/24    Recent Labs     06/11/24  1702 06/11/24  2116 06/12/24  0427 06/13/24  0348 06/14/24  0415 06/14/24  0919   HGB 11.4*   < > 10.9* 9.5* 9.3*  --    INR 1.3*  --   --   --   --  1.1    < > = values in this interval not displayed.     Date               INR                  Dose  6/11  1.3  -   6/14  -  2 mg                                                                                 Assessment/ Plan:  Will order warfarin 2 mg PO x 1 dose.    Pharmacy will continue to monitor daily and adjust therapy as indicated.  Please contact the pharmacist at [phone] for outpatient recommendations if needed.       
Physical Therapy  6/19/2024    Chart reviewed. Attempted to see at 1:20 for PT, however she was eating lunch. Upon return to see patient, she was already up and ambulating with nursing supervision. She has received all d/c education, is compliant with her precautions, and is safe to mobilize with nursing/family for her 3 walks/day while inpatient. Will defer and follow peripherally. Thank you.    Adrienne Montero, PT, DPT  
[facility] ICU Progress Note    Admit Date: 2024  POD:  3    Procedure:  Procedure(s):  1. Tissue Mitral Valve Replacement #25 Linton Mitris.  2. Epiaortic Ultrasound.  3. Ligation of AMARA with 50 mm clip.  4. Septal myectomy via transaortic approach.      with Dr. Hyde:  Implantation of a Medtronic dual chamber Pacemaker.     Admission synopsis:   POD0: Arrived to ICU on precedex, insulin, katie and low-dose epi.   POD1: Extubated 0200. Complete heart block. Weaned off epi, katie remains.   POD2: PPM placed by Dr. Hyde. Midodrine initiated, katie weaned off.   POD3: Epicardial wires pulled, chest tube removed. Midodrine discontinued. PO diuresis. Transfer orders. Initiate coumadin.   Subjective/24hr events:   Pt seen with Dr. Miller. V-paced 60s-70s. On RA, afebrile. No complaints this morning.   Objective:   Vitals:  Blood pressure (!) 106/51, pulse 60, temperature 98.2 °F (36.8 °C), temperature source Oral, resp. rate 18, height 1.651 m (5' 5\"), weight 109.8 kg (242 lb 1.6 oz), SpO2 94 %, not currently breastfeeding.  Temp (24hrs), Av.2 °F (36.8 °C), Min:97.9 °F (36.6 °C), Max:98.3 °F (36.8 °C)      EKG/Rhythm:  Cardiac Rhythm: Ventricular paced    Extubation Date / Time: 24     CT Output: 140 + 130 = 270    Oxygen Therapy: RA    CXR:  Xray Result (most recent):  XR CHEST PORTABLE 2024    Narrative  INDICATION: Post op open heart surgery    EXAMINATION:  AP CHEST, PORTABLE    COMPARISON: 2024    FINDINGS: Single AP portable view of the chest demonstrates interval removal of  right IJ line with no pneumothorax. The cardiomediastinal silhouette is  unchanged. Left subclavian pacemaker is unchanged. Bibasilar  atelectasis/effusions.    Impression  Removal of right IJ line with no pneumothorax. Unchanged bibasilar  atelectasis/effusions.    Electronically signed by Heriberto Grady          Admission Weight: Last Weight   Weight - Scale: 101.8 kg (224 lb 6.4 oz) Weight - Scale: 
Pt seen in pre-op holding area by this RN.  Patient stated name and date of birth and procedure and surgeon, which were verified against pt's armband and consent on pt's chart, respectively.  Pt transferred to OR via stretcher with anesthesia.  Pt transferred to table with slide board and three OR staff and anesthesia.  Pt's Right IJ MAC and Right radial arterial line were reconnected and remain intact.    Pt received 2g Ancef @ 08:30 on 06/11/2024, within one hour of incision time.      Beta-blocker is not indicated for this procedure.    
Referral source:   Blanche Mars at [facility] in [facility] 4 CV SERVICES UNIT.  attended rounds on the CV Services Unit as part of the Interdisciplinary team where the patient's ongoing care was discussed. I reviewed the medical record as part of this encounter.     Outcome: Interdisciplinary team are aware of  availability and were encouraged to request Spiritual Health support as needed.      The  on-call can be reached at ([phone]).     Rev. Kristen Velazquez MDiv, [facility]  Staff     
[facility] ICU Progress Note    Admit Date: 2024  POD:  2 Days Post-Op    Procedure:  Procedure(s):  1. Tissue Mitral Valve Replacement #25 Linton Mitris.  2. Epiaortic Ultrasound.  3. Ligation of AMARA with 50 mm clip.  4. Septal myectomy via transaortic approach.      Subjective/24hr events:   Pt seen with Dr. Paul DAVIS paced.  Underlying with A waves but complete heart block.    Tonio 10 mc/min  Insulin infusion 1.4 units  Keep chest tubes and Ji until after PPM placed this afternoon.   Room air   Objective:   Vitals:  Blood pressure 107/67, pulse 80, temperature 98.4 °F (36.9 °C), temperature source Oral, resp. rate 12, height 1.651 m (5' 5\"), weight 108 kg (238 lb 1.6 oz), SpO2 93 %, not currently breastfeeding.  Temp (24hrs), Av.3 °F (36.8 °C), Min:97.6 °F (36.4 °C), Max:99.4 °F (37.4 °C)    Hemodynamics:  CO:     CI:     CVP: CVP (Mean): 10 mmHg   SVR:     PAP Mean:    ABP:     ABP Mean:      EKG/Rhythm:  Cardiac Rhythm: Ventricular paced    Extubation Date / Time: 24     CT Output: 250/90     Ventilator:  Vent Information  Ventilator Day(s): 1  Ventilator Initiate: Yes  Vent Mode: CPAP/PS    Oxygen Therapy:  Oxygen Therapy  SpO2: 93 %  Pulse Oximeter Device Mode: Continuous  Pulse Oximeter Device Location: Left, Finger  O2 Device: None (Room air)  Skin Assessment: Clean, dry, & intact  FiO2 : 40 %  O2 Flow Rate (L/min): 2 L/min  Oxygen Therapy: None (Room air)  Vent Mode: CPAP/PS    CXR:  XR CHEST PORTABLE TODAY        Result Date: 2024  Interval extubation and removal of NG tube, otherwise unchanged. Electronically signed by Heriberto Grady    XR CHEST PORTABLE    Result Date: 2024  Postoperative changes as described above. Electronically signed by MORGAN DUMONT       Admission Weight: Last Weight   Weight - Scale: 101.8 kg (224 lb 6.4 oz) Weight - Scale: 108 kg (238 lb 1.6 oz)     Intake / Output / Drain:  Current Shift:  1901  0700  In: 1146 [I.V.:1146]  Out: 426 
Spiritual Care Assessment/Progress Note  [facility]    Name: Blanche Mars MRN: [id number]    Age: 69 y.o.     Sex: female   Language: English     Date: 6/12/2024            Total Time Calculated: 21 min              Spiritual Assessment begun in [facility] CV INTNSV CARE  Service Provided For: Patient and family together  Referral/Consult From: Rounding  Encounter Overview/Reason: Initial Encounter, Spiritual/Emotional Needs    Spiritual beliefs:      [x] Involved in a lucio tradition/spiritual practice: Jehovah's witness     [] Supported by a lucio community:      [] Claims no spiritual orientation:      [] Seeking spiritual identity:           [] Adheres to an individual form of spirituality:      [] Not able to assess:                Identified resources for coping and support system:   Support System: Children       [] Prayer                  [] Devotional reading               [] Music                  [] Guided Imagery     [] Pet visits                                        [x] Other: (family)     Specific area/focus of visit   Encounter:    Crisis:    Spiritual/Emotional needs: Type: Spiritual Support  Ritual, Rites and Sacraments:    Grief, Loss, and Adjustments:    Ethics/Mediation:    Behavioral Health:    Palliative Care:    Advance Care Planning:           Narrative:   Referral source:  initiated visit to Blanche Mars at [facility] in [facility] 4 CV INTNSV CARE. I reviewed the medical record prior to this encounter.     Spiritual Assessment:     We discussed Blanche Mars current feelings/concerns and spiritual perspectives.Blanche Mars shared that she had surgery on yesterday and was feeling ok today. She was sitting up in her chair and her daughter Dory was present. She is  with 2 daughters. Blanche is a [facility] [employer] and her prayer group are supporting and praying for her.  No spiritual needs noted.     Outcome: Blanche Mars verbally expressed appreciation for 
\[facility]\"" ICU Progress Note    Admit Date: 2024  POD:  7    Procedure:  Procedure(s):  1. Tissue Mitral Valve Replacement #25 Linton Mitris.  2. Epiaortic Ultrasound.  3. Ligation of AMARA with 50 mm clip.  4. Septal myectomy via transaortic approach.      with Dr. Hyde:  Implantation of a Medtronic dual chamber Pacemaker.     Admission synopsis:   POD0: Arrived to ICU on precedex, insulin, katie and low-dose epi.   POD1: Extubated 0200. Complete heart block. Weaned off epi, katie remains.   POD2: PPM placed by Dr. Hyde. Midodrine initiated, katie weaned off.   POD3: Epicardial wires pulled, chest tube removed. Midodrine discontinued. PO diuresis. Transfer orders. Initiate coumadin.   POD6: Awaiting INR to bump, remains 1.1 this AM.   POD7: INR 1.3.   Subjective/24hr events:   Pt seen with Dr. Saldivar. In chair, looks good. V-paced 90. On RA, afebrile.    BM x 6   Objective:   Vitals:  Blood pressure 138/72, pulse 92, temperature 98 °F (36.7 °C), temperature source Oral, resp. rate 26, height 1.651 m (5' 5\"), weight 108.5 kg (239 lb 4.8 oz), SpO2 92 %, not currently breastfeeding.  Temp (24hrs), Av.6 °F (37 °C), Min:98 °F (36.7 °C), Max:98.9 °F (37.2 °C)      EKG/Rhythm:  Cardiac Rhythm: Ventricular paced    Extubation Date / Time: 24     Oxygen Therapy: RA    CXR:    Xray Result (most recent):  XR CHEST PORTABLE 2024    Narrative  INDICATION: Post op open heart surgery    EXAMINATION:  AP CHEST, PORTABLE    COMPARISON: 2024    FINDINGS: Single AP portable view of the chest demonstrates unchanged left  subclavian pacemaker. Cardiomegaly with left atrial appendage clip and prior  median sternotomy, unchanged. Unchanged pulmonary edema and pleural effusions.  No pneumothorax.    Impression  Pulmonary edema and bilateral pleural effusions without significant change.    Electronically signed by Heriberto Grady          Admission Weight: Last Weight   Weight - Scale: 101.8 kg 
wires.   Hypotension: epinephrine turned off. BP is adequate. Wean katie off today.   Anemia, as expected postop:H&H 10.9/31.5.  No transfusion required. Daily CBC.   Respiratory insufficiency, as expected postop: delayed extubation due to cuff leak.  She was given Solumedrol due to concern for airway swelling. Pt is comfortable on 4LNC. IS, C&DB, OOB to chair.   Afib prophylaxis: s/p AMARA ligation.  No residual shelf. No amiodarone due to asystole.   HLD: rosuvastatin.   Chronic lymphedema: keep legs elevated. Diurese when appropriate (off vasopressors, BP adequate, CVP adequate).   Postop abx: completes in 20 hours.   DVT prophylaxis: SCDs, OOB.  Start Coumadin tomorrow.   GI prophylaxis: Pepcid  Bowel regimen:  Senokot, Miralax. Prn Bisacodyl. MOM tomorrow.   Activity: PT/OT. OOB and ambulate a few steps.  Carefully as pt is pacer dependent.   Pain control: controlled on  Tylenol ATC, prn oxycodone, gabapentin ATC, lidocaine patches.    Renal: decreased urine output.  + NOAM marker but normal creatinine. She received albumin X1 dose overnight.   cc this am. CVP remains adequate. Goal CVP is1 2-16. Repeat NOAM marker at 4 pm with BMP, lactate.  Keep Ji.   Dispo: keep in ICU.     Signed By: Keysha Harley APRN - NP

## 2024-06-20 NOTE — PLAN OF CARE
Problem: Occupational Therapy - Adult  Goal: By Discharge: Performs self-care activities at highest level of function for planned discharge setting.  See evaluation for individualized goals.  Description: FUNCTIONAL STATUS PRIOR TO ADMISSION:  Patient was ambulatory using no DME inside the home and a cane for mobility outside of home. Patient was independent for ADLs/IADLs including driving.     ADL Assistance: Independent, Homemaking Assistance: Independent, Ambulation Assistance: Independent, Transfer Assistance: Independent, Active : Yes     HOME SUPPORT: Patient lived with her daughter, son in law and 16-year old granddaughter but didn't require assistance.    Occupational Therapy Goals:  Initiated 6/12/2024  1.  Patient will perform ADLs standing 5 mins without fatigue or LOB with Supervision within 7 days.  2.  Patient will perform upper body ADLs with Supervision within 7 days.  3.  Patient will perform lower body ADLs with Supervision within 7 days.    4.  Patient will perform gathering ADL items high and low 2/2 with Supervision within 7 days.  5.  Patient will perform toilet transfers with Supervision within 7 days.  6.  Patient will perform all aspects of toileting with Supervision within 7 days.  7.  Patient will participate in cardiac/sternal upper extremity therapeutic exercise/activities to increase independence with ADLs with supervision/set-up for 5 minutes within 7 days.   8.  Patient will adhere to Move in the Tube precautions during functional tasks with verbal cues within 7 days.  Outcome: Progressing   OCCUPATIONAL THERAPY TREATMENT  Patient: Blanche Mars (69 y.o. female)  Date: 6/16/2024  Primary Diagnosis: Aortic stenosis [I35.0]  Mitral regurgitation [I34.0]  Aortic stenosis, severe [I35.0]  Procedure(s) (LRB):  Insert PPM dual (N/A) 3 Days Post-Op   Precautions: Fall Risk, Cardiac, Surgical Protocols, Other (comment) (PPM)                Chart, occupational therapy assessment, 
  Problem: Occupational Therapy - Adult  Goal: By Discharge: Performs self-care activities at highest level of function for planned discharge setting.  See evaluation for individualized goals.  Description: FUNCTIONAL STATUS PRIOR TO ADMISSION:  Patient was ambulatory using no DME inside the home and a cane for mobility outside of home. Patient was independent for ADLs/IADLs including driving.     ADL Assistance: Independent, Homemaking Assistance: Independent, Ambulation Assistance: Independent, Transfer Assistance: Independent, Active : Yes     HOME SUPPORT: Patient lived with her daughter, son in law and 16-year old granddaughter but didn't require assistance.    Occupational Therapy Goals:  Initiated 6/12/2024, upgrade all goals to modified independence 6/19 for weekly RA  1.  Patient will perform ADLs standing 5 mins without fatigue or LOB with Modified [place] within 7 days.  2.  Patient will perform upper body ADLs with modified independence within 7 days.  3.  Patient will perform lower body ADLs using AD PRN with modified independence within 7 days.    4.  Patient will perform gathering ADL items high and low 2/2 with modified independence within 7 days.  5.  Patient will perform toilet transfers with modified independence within 7 days.  6.  Patient will perform all aspects of toileting with modified independence within 7 days.  7.  Patient will participate in cardiac/sternal upper extremity therapeutic exercise/activities to increase independence with ADLs with modified independence for 5 minutes within 7 days.   8.  Patient will adhere to Move in the Tube precautions during functional tasks with verbal cues within 7 days.  Outcome: Progressing   OCCUPATIONAL THERAPY TREATMENT  Patient: Blanche Mars (69 y.o. female)  Date: 6/20/2024  Primary Diagnosis: Aortic stenosis [I35.0]  Mitral regurgitation [I34.0]  Aortic stenosis, severe [I35.0]  Procedure(s) (LRB):  Insert PPM dual (N/A) 7 Days 
  Problem: Pain  Goal: Verbalizes/displays adequate comfort level or baseline comfort level  6/19/2024 2314 by Kathryn Schneider RN  Outcome: Progressing  Flowsheets (Taken 6/19/2024 2000)  Verbalizes/displays adequate comfort level or baseline comfort level: Encourage patient to monitor pain and request assistance     Problem: Safety - Adult  Goal: Free from fall injury  6/19/2024 2314 by Kathryn Schneider RN  Outcome: Progressing  Flowsheets (Taken 6/19/2024 2314)  Free From Fall Injury: Instruct family/caregiver on patient safety     Problem: Discharge Planning  Goal: Discharge to home or other facility with appropriate resources  6/19/2024 2314 by Kathryn Schneider RN  Outcome: Progressing  Flowsheets (Taken 6/19/2024 2000)  Discharge to home or other facility with appropriate resources: Identify barriers to discharge with patient and caregiver     Problem: Skin/Tissue Integrity  Goal: Absence of new skin breakdown  Description: 1.  Monitor for areas of redness and/or skin breakdown  2.  Assess vascular access sites hourly  3.  Every 4-6 hours minimum:  Change oxygen saturation probe site  4.  Every 4-6 hours:  If on nasal continuous positive airway pressure, respiratory therapy assess nares and determine need for appliance change or resting period.  6/19/2024 2314 by Kathryn Schneider RN  Outcome: Progressing     Problem: ABCDS Injury Assessment  Goal: Absence of physical injury  6/19/2024 2314 by Kathryn Schneider RN  Outcome: Progressing  Flowsheets (Taken 6/19/2024 2314)  Absence of Physical Injury: Implement safety measures based on patient assessment     
  Problem: Pain  Goal: Verbalizes/displays adequate comfort level or baseline comfort level  Outcome: Progressing     Problem: Safety - Adult  Goal: Free from fall injury  Outcome: Progressing     Problem: Safety - Medical Restraint  Goal: Remains free of injury from restraints (Restraint for Interference with Medical Device)  Description: INTERVENTIONS:  1. Determine that other, less restrictive measures have been tried or would not be effective before applying the restraint  2. Evaluate the patient's condition at the time of restraint application  3. Inform patient/family regarding the reason for restraint  4. Q2H: Monitor safety, psychosocial status, comfort, nutrition and hydration  Outcome: Progressing     Problem: Discharge Planning  Goal: Discharge to home or other facility with appropriate resources  Outcome: Progressing     Problem: Skin/Tissue Integrity  Goal: Absence of new skin breakdown  Description: 1.  Monitor for areas of redness and/or skin breakdown  2.  Assess vascular access sites hourly  3.  Every 4-6 hours minimum:  Change oxygen saturation probe site  4.  Every 4-6 hours:  If on nasal continuous positive airway pressure, respiratory therapy assess nares and determine need for appliance change or resting period.  Outcome: Progressing     Problem: ABCDS Injury Assessment  Goal: Absence of physical injury  Outcome: Progressing     Problem: Physical Therapy - Adult  Goal: By Discharge: Performs mobility at highest level of function for planned discharge setting.  See evaluation for individualized goals.  Description: FUNCTIONAL STATUS PRIOR TO ADMISSION: Patient was independent and active without use of DME inside her home, uses a SPC when outside the home/ in the community. Denies falls. Lives with her supportive daughter and RANDELL who are able to provide supervision at discharge. Drives. 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     HOME SUPPORT PRIOR TO ADMISSION: The patient lived with 
  Problem: Pain  Goal: Verbalizes/displays adequate comfort level or baseline comfort level  Outcome: Progressing  Flowsheets (Taken 6/13/2024 2229)  Verbalizes/displays adequate comfort level or baseline comfort level:   Encourage patient to monitor pain and request assistance   Administer analgesics based on type and severity of pain and evaluate response   Consider cultural and social influences on pain and pain management   Assess pain using appropriate pain scale   Implement non-pharmacological measures as appropriate and evaluate response     Problem: Safety - Adult  Goal: Free from fall injury  Outcome: Progressing     
  Problem: Physical Therapy - Adult  Goal: By Discharge: Performs mobility at highest level of function for planned discharge setting.  See evaluation for individualized goals.  Description: FUNCTIONAL STATUS PRIOR TO ADMISSION: Patient was independent and active without use of DME inside her home, uses a SPC when outside the home/ in the community. Denies falls. Lives with her supportive daughter and RANDELL who are able to provide supervision at discharge. Drives. 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     HOME SUPPORT PRIOR TO ADMISSION: The patient lived with daughter and RANDELL but did not require assistance.    Physical Therapy Goals  Initiated 6/12/2024  1.  Patient will move from supine to sit and sit to supine in bed with supervision/set-up within 5 day(s).    2.  Patient will perform sit to stand with supervision/set-up within 5 day(s).  3.  Patient will transfer from bed to chair and chair to bed with supervision/set-up using the least restrictive device within 5 day(s).  4.  Patient will ambulate with contact guard assist for 150 feet with the least restrictive device within 5 day(s).   5.  Patient will ascend/descend 6 stairs with one handrail(s) with contact guard assist within 5 day(s).  6.  Patient will perform cardiac exercises per protocol with supervision/set-up within 5 days.  7.  Patient will verbally recall and functionally demonstrate mindful-based movements (\"move in the tube\") principles without cues within 5 days.    Outcome: Progressing       PHYSICAL THERAPY TREATMENT    Patient: Blanche Mars (69 y.o. female)  Date: 6/15/2024  Diagnosis: Aortic stenosis [I35.0]  Mitral regurgitation [I34.0]  Aortic stenosis, severe [I35.0] Aortic stenosis, severe  Procedure(s) (LRB):  Insert PPM dual (N/A) 2 Days Post-Op  Precautions: Fall Risk, Cardiac, Surgical Protocols, Other (comment) (PPM)                      ASSESSMENT:  Patient continues to benefit from skilled PT services and is 
  Problem: Physical Therapy - Adult  Goal: By Discharge: Performs mobility at highest level of function for planned discharge setting.  See evaluation for individualized goals.  Description: FUNCTIONAL STATUS PRIOR TO ADMISSION: Patient was independent and active without use of DME inside her home, uses a SPC when outside the home/ in the community. Denies falls. Lives with her supportive daughter and RANDELL who are able to provide supervision at discharge. Drives. 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     HOME SUPPORT PRIOR TO ADMISSION: The patient lived with daughter and RANDELL but did not require assistance.    Physical Therapy Goals  Initiated 6/12/2024  1.  Patient will move from supine to sit and sit to supine in bed with supervision/set-up within 5 day(s).    2.  Patient will perform sit to stand with supervision/set-up within 5 day(s).  3.  Patient will transfer from bed to chair and chair to bed with supervision/set-up using the least restrictive device within 5 day(s).  4.  Patient will ambulate with contact guard assist for 150 feet with the least restrictive device within 5 day(s).   5.  Patient will ascend/descend 6 stairs with one handrail(s) with contact guard assist within 5 day(s).  6.  Patient will perform cardiac exercises per protocol with supervision/set-up within 5 days.  7.  Patient will verbally recall and functionally demonstrate mindful-based movements (\"move in the tube\") principles without cues within 5 days.    Outcome: Progressing   PHYSICAL THERAPY TREATMENT    Patient: Blanche Mars (69 y.o. female)  Date: 6/13/2024  Diagnosis: Aortic stenosis [I35.0]  Mitral regurgitation [I34.0]  Aortic stenosis, severe [I35.0] Aortic stenosis, severe  Procedure(s) (LRB):  Insert PPM dual (N/A) Day of Surgery  Precautions: Fall Risk, Cardiac, Surgical Protocols, Other (comment) (move in the tube; External pacemaker depenent)                      ASSESSMENT:  Patient continues to benefit 
  Problem: Physical Therapy - Adult  Goal: By Discharge: Performs mobility at highest level of function for planned discharge setting.  See evaluation for individualized goals.  Description: FUNCTIONAL STATUS PRIOR TO ADMISSION: Patient was independent and active without use of DME inside her home, uses a SPC when outside the home/ in the community. Denies falls. Lives with her supportive daughter and RANDELL who are able to provide supervision at discharge. Drives. 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     HOME SUPPORT PRIOR TO ADMISSION: The patient lived with daughter and RANDELL but did not require assistance.    Physical Therapy Goals  Initiated 6/12/2024  1.  Patient will move from supine to sit and sit to supine in bed with supervision/set-up within 5 day(s).    2.  Patient will perform sit to stand with supervision/set-up within 5 day(s).  3.  Patient will transfer from bed to chair and chair to bed with supervision/set-up using the least restrictive device within 5 day(s).  4.  Patient will ambulate with contact guard assist for 150 feet with the least restrictive device within 5 day(s).   5.  Patient will ascend/descend 6 stairs with one handrail(s) with contact guard assist within 5 day(s).  6.  Patient will perform cardiac exercises per protocol with supervision/set-up within 5 days.  7.  Patient will verbally recall and functionally demonstrate mindful-based movements (\"move in the tube\") principles without cues within 5 days.    Outcome: Progressing   PHYSICAL THERAPY TREATMENT    Patient: Blanche Mars (69 y.o. female)  Date: 6/14/2024  Diagnosis: Aortic stenosis [I35.0]  Mitral regurgitation [I34.0]  Aortic stenosis, severe [I35.0] Aortic stenosis, severe  Procedure(s) (LRB):  Insert PPM dual (N/A) 1 Day Post-Op  Precautions: Fall Risk, Cardiac, Surgical Protocols, Other (comment) (PPM)                      ASSESSMENT:  Patient continues to benefit from skilled PT services and is progressing 
  Problem: Physical Therapy - Adult  Goal: By Discharge: Performs mobility at highest level of function for planned discharge setting.  See evaluation for individualized goals.  Description: FUNCTIONAL STATUS PRIOR TO ADMISSION: Patient was independent and active without use of DME inside her home, uses a SPC when outside the home/ in the community. Denies falls. Lives with her supportive daughter and RANDELL who are able to provide supervision at discharge. Drives. 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     HOME SUPPORT PRIOR TO ADMISSION: The patient lived with daughter and RANDELL but did not require assistance.    Physical Therapy Goals  Initiated 6/12/2024-- goals remain appropriate for next 7 days 6/17/2024  1.  Patient will move from supine to sit and sit to supine in bed with supervision/set-up within 7 day(s).    2.  Patient will perform sit to stand with supervision/set-up within 7 day(s).  3.  Patient will transfer from bed to chair and chair to bed with supervision/set-up using the least restrictive device within 7 day(s).  4.  Patient will ambulate with contact guard assist for 150 feet with the least restrictive device within 7 day(s).   5.  Patient will ascend/descend 6 stairs with one handrail(s) with contact guard assist within 7 day(s).  6.  Patient will perform cardiac exercises per protocol with supervision/set-up within 7 days.  7.  Patient will verbally recall and functionally demonstrate mindful-based movements (\"move in the tube\") principles without cues within 7 days.    Outcome: Progressing   PHYSICAL THERAPY TREATMENT    Patient: Blanche Mars (69 y.o. female)  Date: 6/18/2024  Diagnosis: Aortic stenosis [I35.0]  Mitral regurgitation [I34.0]  Aortic stenosis, severe [I35.0] Aortic stenosis, severe  Procedure(s) (LRB):  Insert PPM dual (N/A) 5 Days Post-Op  Precautions: Fall Risk, Cardiac, Surgical Protocols, Other (comment) (PPM)                      ASSESSMENT:  Patient continues to 
0730: Bedside and Verbal shift change report given to EDISON Jordan (oncoming nurse) by EDISON Barba (offgoing nurse). Report included the following information Nurse Handoff Report, Intake/Output, and Cardiac Rhythm v-paced .     1230: pt ambulating hallway w/ EDISON Diaz.     1430: pt ambulating hallway w/ kassie RN. Pt tolerated well. O2 satting at 94%.     1930: Bedside and Verbal shift change report given to EDISON Peralta (oncoming nurse) by EDISON Jordan (offgoing nurse). Report included the following information Nurse Handoff Report, Intake/Output, and Cardiac Rhythm v-paced .     Problem: Pain  Goal: Verbalizes/displays adequate comfort level or baseline comfort level  6/19/2024 0943 by Nikki Villatoro RN  Outcome: Progressing  6/18/2024 2013 by Jameson Oates RN  Outcome: Progressing  Flowsheets (Taken 6/18/2024 2000)  Verbalizes/displays adequate comfort level or baseline comfort level: Encourage patient to monitor pain and request assistance     Problem: Safety - Adult  Goal: Free from fall injury  6/19/2024 0943 by Nikki Villatoro RN  Outcome: Progressing  6/18/2024 2013 by Jameson Oates RN  Outcome: Progressing     Problem: Safety - Medical Restraint  Goal: Remains free of injury from restraints (Restraint for Interference with Medical Device)  Description: INTERVENTIONS:  1. Determine that other, less restrictive measures have been tried or would not be effective before applying the restraint  2. Evaluate the patient's condition at the time of restraint application  3. Inform patient/family regarding the reason for restraint  4. Q2H: Monitor safety, psychosocial status, comfort, nutrition and hydration  6/19/2024 0943 by Nikki Villatoro RN  Outcome: Progressing  6/18/2024 2013 by Jameson Oates RN  Outcome: Progressing     Problem: Discharge Planning  Goal: Discharge to home or other facility with appropriate resources  6/19/2024 0943 by Nikki Villatoro RN  Outcome: Progressing  6/18/2024 2013 by Иван 
1215: Bedside and Verbal shift change report given to EDISON Jordan (oncoming nurse) by EDISON Paulson (offgoing nurse). Report included the following information Nurse Handoff Report, Intake/Output, and Cardiac Rhythm v-paced .   Pt up to chair and oriented to unit. Vital signs stable, pt assessed and pacemaker site dressing is clean, dry, and intact. Call bell within reach, bed in lowest position.       Problem: Pain  Goal: Verbalizes/displays adequate comfort level or baseline comfort level  Outcome: Progressing     Problem: Safety - Adult  Goal: Free from fall injury  Outcome: Progressing     Problem: Safety - Medical Restraint  Goal: Remains free of injury from restraints (Restraint for Interference with Medical Device)  Description: INTERVENTIONS:  1. Determine that other, less restrictive measures have been tried or would not be effective before applying the restraint  2. Evaluate the patient's condition at the time of restraint application  3. Inform patient/family regarding the reason for restraint  4. Q2H: Monitor safety, psychosocial status, comfort, nutrition and hydration  Outcome: Progressing     Problem: Discharge Planning  Goal: Discharge to home or other facility with appropriate resources  Outcome: Progressing     Problem: Skin/Tissue Integrity  Goal: Absence of new skin breakdown  Description: 1.  Monitor for areas of redness and/or skin breakdown  2.  Assess vascular access sites hourly  3.  Every 4-6 hours minimum:  Change oxygen saturation probe site  4.  Every 4-6 hours:  If on nasal continuous positive airway pressure, respiratory therapy assess nares and determine need for appliance change or resting period.  Outcome: Progressing     Problem: ABCDS Injury Assessment  Goal: Absence of physical injury  Outcome: Progressing     Problem: Occupational Therapy - Adult  Goal: By Discharge: Performs self-care activities at highest level of function for planned discharge setting.  See evaluation for 
PHYSICAL THERAPY TREATMENT    Patient: Blanche Mars (69 y.o. female)  Date: 6/16/2024  Diagnosis: Aortic stenosis [I35.0]  Mitral regurgitation [I34.0]  Aortic stenosis, severe [I35.0] Aortic stenosis, severe  Procedure(s) (LRB):  Insert PPM dual (N/A) 3 Days Post-Op  Precautions: Fall Risk, Cardiac, Surgical Protocols, Other (comment) (PPM)                      ASSESSMENT:  Patient continues to benefit from skilled PT services and is progressing towards goals. Pt overall tolerating session well, BP stable throughout session. Pt ambulating with RW and trialing stair negotiation with no more than CGA. Pt verbalizing understanding of precautions, although requires intermittent reminders to decrease pressure through LUE with use of RW. Anticipate pt will be able to return home with s/a once medically stable. Will continue to follow.          PLAN:  Patient continues to benefit from skilled intervention to address the above impairments.  Continue treatment per established plan of care.        Recommendation for discharge: (in order for the patient to meet his/her long term goals): Therapy 2x a week in the home    Other factors to consider for discharge: no additional factors    IF patient discharges home will need the following DME: patient owns DME required for discharge       SUBJECTIVE:   Patient agreeable to participate with PT.    OBJECTIVE DATA SUMMARY:   Critical Behavior:  Orientation  Overall Orientation Status: Within Normal Limits  Orientation Level: Oriented X4  Cognition  Overall Cognitive Status: WNL    Functional Mobility Training:  Bed Mobility:  Bed Mobility Training  Bed Mobility Training: No (Pt received OOB > chair)  Transfers:  Transfer Training  Transfer Training: Yes  Interventions: Safety awareness training;Tactile cues;Verbal cues  Sit to Stand: Stand-by assistance;Assist X1;Additional time  Stand to Sit: Stand-by assistance;Assist X1  - Vc'ing for hand placement on 
and goals were reviewed.    ASSESSMENT  Patient continues to benefit from skilled OT services and is progressing towards goals. Patient received sitting on BSC, agreeable to working with therapy. Patient completing toileting and returned to recliner. Ambulating with RW around bed and stood at sink for a couple minutes to wash hands before returning to recliner. Patient participated in cardiac exercises as below and then reviewed ADL strategies for discharge including upper and lower body dressing and showering. Patient is a shower candidate prior to discharge if transfers to step down unit. Patient would benefit from skilled OT services during admission to improve independence with self care and functional mobility/transfers. Recommend discharge to home with HHOT and family assist at this time.         PLAN :  Patient continues to benefit from skilled intervention to address the above impairments.  Continue treatment per established plan of care to address goals.    Recommend with staff: up to chair for all meals, up to bathroom vs BSC for toileting dependent on if she transfers to stepdown, assist x1 with RW for transfers    Recommend next OT session: shower candidate if transfers to step down unit    Recommendation for discharge: (in order for the patient to meet his/her long term goals): Therapy 2x a week in the home, however, may require supervision, cognitive and/or physical assistance due to the following concerns listed below:    Other factors to consider for discharge: high risk for falls and concern for safely navigating or managing the home environment    IF patient discharges home will need the following DME: patient owns DME required for discharge, reports shower chair is being delivered soon     SUBJECTIVE:   Patient stated “I feel pretty good about all the self care stuff for home.”    OBJECTIVE DATA SUMMARY:     Cognitive/Behavioral Status:  Orientation  Overall Orientation Status: Within Normal 
Mobility:  Bed Mobility Training  Bed Mobility Training: No     Transfers:   Transfer Training  Transfer Training: Yes  Sit to Stand: Supervision  Stand to Sit: Supervision  Toilet Transfer: Supervision      Balance:     Balance  Sitting: Intact  Standing: Intact;With support      ADL Intervention:        Grooming: .  - Washing Hands : Supervision and Standing at sink    Toileting: .  - Bladder Hygiene : Supervision and Standing   - Clothing Management : Supervision and Standing                    Patient instructed and educated on mindful movement principles based on “Move in The Tube” maintaining bilateral elbows close to rib cage when performing any load-bearing activity.  Educated on applying this concept when getting in/out of bed, pushing up from a chair, opening a door, or lifting a box.  Patient has been provided handouts with diagrams of each correct/incorrect method of performing each of the above tasks.     Patient instructed on the ability to utilize upper extremities “outside the tube” when doing any non-load bearing activity such as washing hair/body, brushing teeth, retrieving clothing items, or scratching your back. Patient encouraged to also perform upper extremity exercises \"outside of the tube\" to prevent scar tissue formation around sternal incision site.    Patient instructed in detail about activities to heed with caution, allowing pain to be the guide. These activities include but are not limited to: mowing the lawn, riding a bike, walking a dog, lifting a child, workshop hobbies, golfing, sexual activity, vacuuming, fishing, scrubbing the floors, and moving furniture. Patient was given the “Keep Your Move in the Tube” handout to describe each of these activities in detail.     Increase activity tolerance for home, work, and sexual intercourse by pacing self with increasing the arm exercises, sitting duration, frequency OOB, walking, standing, and ADLs. Instructed and indicated understanding 
Status:  Orientation  Overall Orientation Status: Within Normal Limits  Orientation Level: Oriented X4  Cognition  Overall Cognitive Status: WNL    Functional Mobility and Transfers for ADLs:  Bed Mobility:  Bed Mobility Training  Bed Mobility Training: No     Transfers:   Transfer Training  Transfer Training: Yes  Sit to Stand: Stand-by assistance  Stand to Sit: Stand-by assistance  Toilet Transfer: Stand-by assistance (BSC)      Balance:     Balance  Sitting: Intact  Standing: Impaired  Standing - Static: Good;Constant support  Standing - Dynamic: Fair;Constant support      ADL Intervention:        Grooming: .  - Washing Hands : Stand-by Assistance and Standing at sink    Toileting: .  - Bladder Hygiene : Stand-by Assistance and Standing   - Clothing Management : Minimal Assistance and Standing            Upper Extremity Bathing : .  - Sternal site hygiene: Stand By assistance and Standing at sink        Patient instructed and educated on mindful movement principles based on “Move in The Tube” maintaining bilateral elbows close to rib cage when performing any load-bearing activity.  Educated on applying this concept when getting in/out of bed, pushing up from a chair, opening a door, or lifting a box.  Patient has been provided handouts with diagrams of each correct/incorrect method of performing each of the above tasks.     Patient instructed on the ability to utilize upper extremities “outside the tube” when doing any non-load bearing activity such as washing hair/body, brushing teeth, retrieving clothing items, or scratching your back. Patient encouraged to also perform upper extremity exercises \"outside of the tube\" to prevent scar tissue formation around sternal incision site.    Patient instructed in detail about activities to heed with caution, allowing pain to be the guide. These activities include but are not limited to: mowing the lawn, riding a bike, walking a dog, lifting a child, workshop hobbies, 
discharges home will need the following DME: patient owns DME required for discharge     SUBJECTIVE:   Patient stated “My head feels fine.”    OBJECTIVE DATA SUMMARY:     Cognitive/Behavioral Status:  Orientation  Overall Orientation Status: Within Normal Limits  Orientation Level: Oriented X4  Cognition  Overall Cognitive Status: WNL    Functional Mobility and Transfers for ADLs:  Bed Mobility:  Bed Mobility Training  Bed Mobility Training: Yes  Rolling: Minimum assistance;Assist X2  Supine to Sit: Minimum assistance;Assist X2  Scooting: Minimum assistance;Assist X2     Transfers:   Transfer Training  Transfer Training: Yes  Sit to Stand: Minimum assistance;Assist X1  Stand to Sit: Minimum assistance;Assist X1  Bed to Chair: Minimum assistance;Assist X1      Balance:     Balance  Sitting: Impaired  Sitting - Static: Good (unsupported)  Sitting - Dynamic: Fair (occasional)  Standing: Impaired  Standing - Static: Fair;Constant support  Standing - Dynamic: Fair;Constant support      ADL Intervention:                    Lower Extremity Dressing: .  - Socks : Minimal Assistance and Seated in chair  - LE Adaptive Equipment Used : Sock Aide and Dressing Stick            Patient instructed and educated on mindful movement principles based on “Move in The Tube” maintaining bilateral elbows close to rib cage when performing any load-bearing activity.  Educated on applying this concept when getting in/out of bed, pushing up from a chair, opening a door, or lifting a box.  Patient has been provided handouts with diagrams of each correct/incorrect method of performing each of the above tasks.     Patient instructed on the ability to utilize upper extremities “outside the tube” when doing any non-load bearing activity such as washing hair/body, brushing teeth, retrieving clothing items, or scratching your back. Patient encouraged to also perform upper extremity exercises \"outside of the tube\" to prevent scar tissue formation 
Dynamic: Good (unsupported)  Standing: Impaired  Standing - Static: Fair  Standing - Dynamic: Fair  Ambulation/Gait Training:                       Gait  Assistive Device: Gait belt;Walker, rolling  Base of Support: Widened  Speed/Graciela: Pace decreased (< 100 feet/min)  Step Length: Right shortened;Left shortened  Gait Abnormalities: Decreased step clearance                                                                                                                                                                                                                                                    Intervention/Education specific to: \"Move in the tube\"  Patient mobilized on continuous portable monitor/telemetry.    The patient is verbalizing and is demonstrating understanding of mindful-based movements (\"move in the tube\") principles of keeping UEs proximal to ribcage to prevent lateral pull on the sternum during load-bearing activities with visual, verbal, and manual cues required for compliance.    Cardiac diagnosis intervention:  Patient instructed and educated on mindful movement principles based on “Move in The Tube” concept to include maintaining bilateral elbows close to rib cage when performing any load-bearing activity such as getting in/out of bed, pushing up from a chair, opening a door, or lifting a box.  Patient was given a handout with diagrams of each correct/incorrect method of performing each of the above tasks.    Activity Tolerance:   Good, requires rest breaks, and SpO2 stable on room air    After treatment:   Patient left in no apparent distress sitting up in chair, Call bell within reach, and Heels elevated for pressure relief    COMMUNICATION/EDUCATION:   The patient's plan of care was discussed with: occupational therapist and registered nurse         Thank you for this referral.  Adrienne Montero, PT, DPT  Minutes: 19      
left PPM precautions as any movement above 90* would need to be symmetrical.      Upper Body Dressing Education:    Pullover Shirt:     -place pull over shirt face down, thread bilateral UE through sleeves and pull up to mid humerus   -grasp shirt with bilateral hands at neck and bottom of shirt    -bring over head with both arms going up at the same time  -rotate at waist with shoulder following hip motion to pull shirt tail down    Open front shirt:   -pull shirt over one arm first   -reaching symmetrically with both UE over head to grasp collar of shirt   -thread shirt over other arm  -doff by grabbing at collar with bilateral hands over head and pulling shirt over head    Toileting: Educated to rotate at the waist having shoulders rotate with waist to perform brenda/anal care with Good understanding.  Instructed if they have continued pain, decreased functional reach with shoulder IR for BM hygiene can use wet wipes and toilet tongs PRN. Reinforced to avoid valsalva maneuvers with all tasks.            Pain Rating:  No complaints this session     Pain Intervention(s):   pain is at a level acceptable to the patient    Activity Tolerance:   Good, requires rest breaks, and SpO2 stable on room air  Please refer to the flowsheet for vital signs taken during this treatment.    After treatment:   Patient left in no apparent distress in bed, Call bell within reach, and Bed/ chair alarm activated    COMMUNICATION/EDUCATION:   The patient's plan of care was discussed with: physical therapist and registered nurse    Patient Education  Education Given To: Patient  Education Provided: Role of Therapy;Plan of Care;ADL Adaptive Strategies;Mobility Training;Fall Prevention Strategies;Transfer Training;Precautions;Home Exercise Program  Education Method: Demonstration;Verbal;Teach Back  Barriers to Learning: None  Education Outcome: Verbalized understanding;Demonstrated understanding;Continued education needed    Thank you for 
                                            [facility] AM-PAC®      Basic Mobility Inpatient Short Form (6-Clicks) Version 2    How much help is needed turning from your back to your side while in a flat bed without using bedrails?: A Little  How much help is needed moving from lying on your back to sitting on the side of a flat bed without using bedrails?: A Lot  How much help is needed moving to and from a bed to a chair?: A Little  How much help is needed standing up from a chair using your arms?: A Little  How much help is needed walking in hospital room?: A Lot  How much help is needed climbing 3-5 steps with a railing?: A Lot    -PAC Inpatient Mobility Raw Score : 15  -PAC Inpatient T-Scale Score : 39.45     Cutoff score ?171,2,3 had higher odds of discharging home with home health or need of SNF/IPR.    1. Ayaka Lara, Lizzie Baca, Lan Jasmine, Reina Bates, Javy Purcell, Jose Manuel Lara.  Validity of the -PAC “6-Clicks” Inpatient Daily Activity and Basic Mobility Short Forms. Physical Therapy Mar 2014, 94  379-391; DOI: 10.2522/ptj.[email or web address]  2. Jaswant BURKS, Chavez J, Stephy J, Tom J. Association of AM-PAC \"6-Clicks\" Basic Mobility and Daily Activity Scores With Discharge Destination. Phys Ther. 2021 Apr 4;101(4):idnn828. doi: 10.1093/ptj/ljpz160. PMID: 41300116.  3. Michael EPPERSON, Lenka D, Monie S, Belinda K, Miryam S. Activity Measure for Post-Acute Care \"6-Clicks\" Basic Mobility Scores Predict Discharge Destination After Acute Care Hospitalization in Select Patient Groups: A Retrospective, Observational Study. Arch Rehabil Res Clin Transl. 2022 Jul 16;4(3):865811. doi: [email or web address]/j.arrct.2022.192101. PMID: 02146576; PMCID: [id number].  4. Marco VEGA, Karyna S, Caro W, Makenzie P. AM-PAC Short Forms Manual 4.0. Revised 2/2020.                                                                                                                                                        
putting on and taking off regular lower body clothing?: A Lot  How much help is needed for bathing (which includes washing, rinsing, drying)?: A Lot  How much help is needed for toileting (which includes using toilet, bedpan, or urinal)?: A Little  How much help is needed for putting on and taking off regular upper body clothing?: A Little  How much help is needed for taking care of personal grooming?: A Little  How much help for eating meals?: None  AM-[facility] Inpatient Daily Activity Raw Score: 17  AM-PAC Inpatient ADL T-Scale Score : 37.26  ADL Inpatient CMS 0-100% Score: 50.11  ADL Inpatient CMS G-Code Modifier : CK     Interpretation of Tool:  Represents clinically-significant functional categories (i.e. Activities of daily living).  Cutoff score 39.4 (19) correlates to a good likelihood of discharging home versus a facility  Ayaka Lara, Lizzie Baca, Lan Jasmine, Reina Bates, Javy Purcell, Jose Manuel Lara, AM-PAC “6-Clicks” Functional Assessment Scores Predict Acute Care Hospital Discharge Destination, Physical Therapy, Volume 94, Issue 9, 1 September 2014, Pages 4337-6605, [email or web address]       Pain Rating:  Minimal pain reported throughout session     Pain Intervention(s):   repositioning and pain is at a level acceptable to the patient    Activity Tolerance:   Fair , requires rest breaks, and signs and symptoms of orthostatic hypotension    After treatment:   Patient left in no apparent distress sitting up in chair, Call bell within reach, and nursing staff present in room    COMMUNICATION/EDUCATION:   The patient's plan of care was discussed with: physical therapist and registered nurse    Patient Education  Education Given To: Patient;Family  Education Provided: Role of Therapy;Plan of Care;ADL Adaptive Strategies;Mobility Training;Fall Prevention Strategies;Transfer Training;Precautions  Education Method: Verbal;Demonstration  Barriers to Learning: None  Education

## 2024-06-21 ENCOUNTER — ANTI-COAG VISIT (OUTPATIENT)
Age: 69
End: 2024-06-21

## 2024-06-21 ENCOUNTER — TELEPHONE (OUTPATIENT)
Age: 69
End: 2024-06-21

## 2024-06-21 DIAGNOSIS — Z95.2 S/P MVR (MITRAL VALVE REPLACEMENT): Primary | ICD-10-CM

## 2024-06-21 LAB — INR BLD: 2.03

## 2024-06-21 NOTE — TELEPHONE ENCOUNTER
Cardiac Surgery Discharge - Follow up call placed to Blanche Mars. Reviewed plan of care after discharge and encouraged Blanche Mars to verbalize.  Discussed precautions and reviewed medications, patient without questions regarding medications. Encouraged continued use of the incentive spirometer.  Confirmed follow up appts and reinforced importance of wearing red reminder bracelet. Blanche Mars is without questions or concerns. Reviewed  agency to be [facility], they will be obtaining her INR today.

## 2024-06-21 NOTE — PROGRESS NOTES
Called Agency to get INR before end of day, nurse has not seen patient yet. Will have to call on-call provider.     Melissa Martinez NP

## 2024-06-25 ENCOUNTER — ANTI-COAG VISIT (OUTPATIENT)
Age: 69
End: 2024-06-25

## 2024-06-25 ENCOUNTER — OFFICE VISIT (OUTPATIENT)
Age: 69
End: 2024-06-25

## 2024-06-25 VITALS
SYSTOLIC BLOOD PRESSURE: 103 MMHG | BODY MASS INDEX: 38.44 KG/M2 | WEIGHT: 231 LBS | OXYGEN SATURATION: 96 % | RESPIRATION RATE: 20 BRPM | DIASTOLIC BLOOD PRESSURE: 68 MMHG | TEMPERATURE: 98.2 F | HEART RATE: 87 BPM

## 2024-06-25 DIAGNOSIS — Z95.2 S/P MVR (MITRAL VALVE REPLACEMENT): Primary | ICD-10-CM

## 2024-06-25 LAB — INR BLD: 1.8

## 2024-06-25 PROCEDURE — 99024 POSTOP FOLLOW-UP VISIT: CPT | Performed by: THORACIC SURGERY (CARDIOTHORACIC VASCULAR SURGERY)

## 2024-06-25 RX ORDER — POTASSIUM CHLORIDE 1500 MG/1
TABLET, EXTENDED RELEASE ORAL
Qty: 23 TABLET | Refills: 0 | Status: SHIPPED | OUTPATIENT
Start: 2024-06-25 | End: 2024-07-15

## 2024-06-25 RX ORDER — FUROSEMIDE 20 MG/1
TABLET ORAL
Qty: 69 TABLET | Refills: 0 | Status: SHIPPED | OUTPATIENT
Start: 2024-06-25 | End: 2024-07-15

## 2024-06-25 NOTE — PROGRESS NOTES
Patient: Blanche Mars   Age: 69 y.o.     Patient Care Team:  Thao Dobbins MD as PCP - General (Family Medicine)  Carlie Pepper MD as Consulting Physician (Cardiology)  Saroj Miller MD as Consulting Physician (Cardiothoracic Surgery)    Diagnosis: There were no encounter diagnoses.    Problem List:   Patient Active Problem List   Diagnosis    Osteoarthritis of left knee    Abnormal stress test    Aortic stenosis    Mitral regurgitation    Aortic stenosis, severe    S/P MVR (mitral valve replacement)    S/P ventricular septal myectomy    S/P left atrial appendage ligation    Complete heart block (HCC)      6/11/2024 with Dr. Miller:  1. Tissue Mitral Valve Replacement #25 Linton Mitris.  2. Epiaortic Ultrasound.  3. Ligation of AMARA with 50 mm clip.  4. Septal myectomy via transaortic approach.     6/13/24 with Dr. Hyde:  Successful implantation of a Medtronic dual chamber Pacemaker. ([place]™ XT DR VIDAL Stephens )    HPI:  Pt is here for post op follow up. Reports she is surprised by the lack of pain she has. The patient denies fever, sternal clicking/popping, and incisional drainage/openings. Her weight has been steady since discharge but she remains about 6 pounds up from pre-surgery weight. She denies angina, dizziness, SOB, palpitations, and increased LE edema from baseline lymphedema. The patient reports they are moving around their house well and performing ADLs independently without issue. The patient is eating well, voiding without issue, and having regular bowel movements. The patient reports they are sleeping without issue.    Reported home data:  Weight: 228 to 230 pounds  BP: Reports SBP around 130 this morning but in office today she is around 100    Current Medications:   Current Outpatient Medications   Medication Sig Dispense Refill    acetaminophen (TYLENOL) 325 MG tablet Take 3 tablets by mouth every 6 hours Transition to PRN as pain improves 120 tablet 3    aspirin 81 MG EC tablet

## 2024-06-27 ENCOUNTER — ANTI-COAG VISIT (OUTPATIENT)
Age: 69
End: 2024-06-27

## 2024-06-27 ENCOUNTER — PROCEDURE VISIT (OUTPATIENT)
Age: 69
End: 2024-06-27

## 2024-06-27 DIAGNOSIS — Z95.2 S/P MVR (MITRAL VALVE REPLACEMENT): Primary | ICD-10-CM

## 2024-06-27 DIAGNOSIS — Z95.0 CARDIAC PACEMAKER IN SITU: Primary | ICD-10-CM

## 2024-06-27 LAB — INR BLD: 2

## 2024-06-27 NOTE — PROGRESS NOTES
Addendum    DR Nash informed me he had talked to Hematology at the [facility] and they had rec they he stay on lovenox and coumadin.  Park Chavez     Patient presents for wound check post-device implantation. The dressing was removed and the site was inspected. The site appeared to be well-healing without ecchymosis/tenderness/erythema. Denies pain, fevers, discharge. Pt educated on continued limb restrictions.           Future Appointments   Date Time Provider Department Center   7/15/2024  2:15 PM Saroj Miller MD [facility]   9/16/2024 10:20 AM PACEMAKER, STDAVID [facility]   9/16/2024 10:40 AM Celeste Maki APRN - CNP [facility]   10/4/2024  9:00 AM Bakari Randall MD [facility]         Continue follow up in device clinic as planned.

## 2024-07-01 ENCOUNTER — TELEPHONE (OUTPATIENT)
Age: 69
End: 2024-07-01

## 2024-07-01 NOTE — TELEPHONE ENCOUNTER
Dayna phoned patient has COVID in her home and they wanted to postpone her visit.  Spoke to Juan NP she need an INR tomorrow, visit cannot be postponed pat tomorrow and will dose the patient with 5 mg tonight.     Spoke to Georgina advised take 5mg tonight and HH will be out tomorrow to get her INR

## 2024-07-02 ENCOUNTER — ANTI-COAG VISIT (OUTPATIENT)
Age: 69
End: 2024-07-02

## 2024-07-02 DIAGNOSIS — Z95.2 S/P MVR (MITRAL VALVE REPLACEMENT): Primary | ICD-10-CM

## 2024-07-02 LAB — INR BLD: 2.2

## 2024-07-05 ENCOUNTER — ANTI-COAG VISIT (OUTPATIENT)
Age: 69
End: 2024-07-05

## 2024-07-05 DIAGNOSIS — Z95.2 S/P MVR (MITRAL VALVE REPLACEMENT): Primary | ICD-10-CM

## 2024-07-05 LAB — INR BLD: 2.3

## 2024-07-10 ENCOUNTER — ANTI-COAG VISIT (OUTPATIENT)
Age: 69
End: 2024-07-10

## 2024-07-10 DIAGNOSIS — Z95.2 S/P MVR (MITRAL VALVE REPLACEMENT): Primary | ICD-10-CM

## 2024-07-10 LAB — INR BLD: 1.7

## 2024-07-10 NOTE — PROGRESS NOTES
Discussed with  nurse and Pt. INR came low at 1.7. Plans to continue coumadin at 6 mg on 7/10-7/12, and 7/14. Take 5 mg on 7/13. Recheck INR on Monday, 7/15 with office appointment.    NurseNeha requested call on Monday with date of next needed INR check. [phone]

## 2024-07-15 ENCOUNTER — ANTI-COAG VISIT (OUTPATIENT)
Age: 69
End: 2024-07-15

## 2024-07-15 ENCOUNTER — OFFICE VISIT (OUTPATIENT)
Age: 69
End: 2024-07-15
Payer: MEDICARE

## 2024-07-15 VITALS
SYSTOLIC BLOOD PRESSURE: 122 MMHG | OXYGEN SATURATION: 95 % | WEIGHT: 218 LBS | TEMPERATURE: 98.2 F | DIASTOLIC BLOOD PRESSURE: 79 MMHG | RESPIRATION RATE: 16 BRPM | HEART RATE: 101 BPM | BODY MASS INDEX: 36.28 KG/M2

## 2024-07-15 DIAGNOSIS — Z95.2 S/P MVR (MITRAL VALVE REPLACEMENT): Primary | ICD-10-CM

## 2024-07-15 DIAGNOSIS — Z95.0 S/P PLACEMENT OF CARDIAC PACEMAKER: ICD-10-CM

## 2024-07-15 DIAGNOSIS — Z79.01 ANTICOAGULANT LONG-TERM USE: ICD-10-CM

## 2024-07-15 LAB
INR BLD: 1.9
POC INR: NORMAL
PROTHROMBIN TIME, POC: NORMAL

## 2024-07-15 PROCEDURE — 99024 POSTOP FOLLOW-UP VISIT: CPT | Performed by: THORACIC SURGERY (CARDIOTHORACIC VASCULAR SURGERY)

## 2024-07-15 PROCEDURE — 85610 PROTHROMBIN TIME: CPT | Performed by: THORACIC SURGERY (CARDIOTHORACIC VASCULAR SURGERY)

## 2024-07-15 RX ORDER — FUROSEMIDE 20 MG/1
60 TABLET ORAL AS NEEDED
Qty: 60 TABLET | Refills: 0 | Status: SHIPPED | OUTPATIENT
Start: 2024-07-15

## 2024-07-15 RX ORDER — POTASSIUM CHLORIDE 20 MEQ/1
20 TABLET, EXTENDED RELEASE ORAL AS NEEDED
Qty: 20 TABLET | Refills: 0 | Status: SHIPPED | OUTPATIENT
Start: 2024-07-15

## 2024-07-15 NOTE — PROGRESS NOTES
AMARA clip: No residual shelf.   - postop echo - completed 6/13 and 6/15.   Noted \"rocking motion: of MV in both reports, clarified with Dr. Mckeon.  Could be explained by hyperdynamic LV.  No PVL or valve gradient, discussed w/ Dr. May.    - s/p myomectomy -- no LVOT obstruction noted on TTE 6/15  - Continue ASA 81mg daily  - Continue Coumadin (DC home with 1mg and 5mg tablets). Next INR on Thursday with HH. Will work on transitioning management with Dr. Meredith's office. They have placed an order for her to have this checked at the coumadin clinic and their office will manage after this Thursday.  - will schedule follow up with Dr. Meredith.     Aortic stenosis: valve examined in OR by Dr. Miller and Dr. Valdes, noted aortic leaflets, though sclerotic, were quite pliable. Felt it was in best interest to preserve valve given lack of AI and only moderate stenosis  - Post-op ECHO with mild regurgitation and mild to moderate stenosis  - Monitor outpatient  - Consider TAVR in future if indicated     Asystole/CHB s/p PPM: Resolved  - Dr. Hyde placed Medtronic dual chamber on 6/13     HLD:   - Continue PTA Crestor 40mg      HTN: PTA Amlodipine 5mg  - normotensive     Heart Failure preserved EF: Grade II diastolic dysfunction noted on ECHO  - Will transition to Lasix to 60 mg prn for weight gain     Chronic lymphedema:   - keep legs elevated  - cont home compression socks  - Lasix as discussed  - Would avoid restarting amlodipine    Pt is ready to start cardiac rehab.     Rehab - will start after this Thursday  Walking: yes  Antibiotic card for valves: yes

## 2024-07-16 ENCOUNTER — TELEPHONE (OUTPATIENT)
Facility: HOSPITAL | Age: 69
End: 2024-07-16

## 2024-07-16 NOTE — TELEPHONE ENCOUNTER
Informed pt that PCP will manage her INR/Warfarin dosing. She will call their office to make an appt for 7/25 or 7/26.

## 2024-07-16 NOTE — TELEPHONE ENCOUNTER
[facility] - Anticoagulation Referral    The [facility] received a referral for anticoagulation services for warfarin therapy. Reviewing the referral showed the patient has [employer] health insurance. Some patients with the [employer] plan has received bills in excess of $100 for services received from the [facility]. [facility] [employer] and [employer] are working together to resolve this issue. However, to-date this issue has not been resolved. Informed nurse Tiana Khan of the billing issue. Nurse Khan will reach out to patient's PCP as a possible alternative to anticoagulation management for warfarin therapy.    Thank you.  Rm Bansal, PharmD

## 2024-07-18 ENCOUNTER — ANTI-COAG VISIT (OUTPATIENT)
Age: 69
End: 2024-07-18

## 2024-07-18 ENCOUNTER — TELEPHONE (OUTPATIENT)
Age: 69
End: 2024-07-18

## 2024-07-18 DIAGNOSIS — Z95.2 S/P MVR (MITRAL VALVE REPLACEMENT): Primary | ICD-10-CM

## 2024-07-18 LAB — INR BLD: 1.8

## 2024-07-18 PROCEDURE — 99024 POSTOP FOLLOW-UP VISIT: CPT | Performed by: NURSE PRACTITIONER

## 2024-07-18 NOTE — PROGRESS NOTES
We saw Mrs. Mars on 7/15 for her 1 month appointment s/p bioprosthetic MVR. At that time, we contacted Dr. Meredith's office to discuss taking over INR management for the next 2 months (total of 3 months of anticoagulation). They agreed but per charting from Dr. Bansal ([facility]), there were concerns over costs for her to have INR management through the clinic. Per charting, the current plan is to have her PCP manage the INR with her first appointment for either June 25th or 26th.   The patient's INR was called in to my office today at 1.8. I have dosed her through the 26th with the plan of her INR being checked 25 or 26th. I will complete this encounter today. For her PCP, her dosing thus far is as follows:        Melissa Martinez NP  Cardiac Surgery

## 2024-07-22 ENCOUNTER — TELEPHONE (OUTPATIENT)
Age: 69
End: 2024-07-22

## 2024-08-06 ENCOUNTER — HOSPITAL ENCOUNTER (OUTPATIENT)
Facility: HOSPITAL | Age: 69
Setting detail: RECURRING SERIES
Discharge: HOME | End: 2024-08-09
Payer: MEDICARE

## 2024-08-06 VITALS — HEIGHT: 65 IN | WEIGHT: 213.6 LBS | BODY MASS INDEX: 35.59 KG/M2

## 2024-08-06 PROCEDURE — 93798 PHYS/QHP OP CAR RHAB W/ECG: CPT

## 2024-08-06 PROCEDURE — 93797 PHYS/QHP OP CAR RHAB WO ECG: CPT

## 2024-08-06 ASSESSMENT — EXERCISE STRESS TEST
PEAK_BP: 113/59
PEAK_BP: 113/59
PEAK_HR: 116
PEAK_METS: 2
PEAK_RPE: 10

## 2024-08-06 ASSESSMENT — PATIENT HEALTH QUESTIONNAIRE - PHQ9
7. TROUBLE CONCENTRATING ON THINGS, SUCH AS READING THE NEWSPAPER OR WATCHING TELEVISION: NOT AT ALL
SUM OF ALL RESPONSES TO PHQ QUESTIONS 1-9: 1
2. FEELING DOWN, DEPRESSED OR HOPELESS: NOT AT ALL
SUM OF ALL RESPONSES TO PHQ9 QUESTIONS 1 & 2: 0
4. FEELING TIRED OR HAVING LITTLE ENERGY: SEVERAL DAYS
1. LITTLE INTEREST OR PLEASURE IN DOING THINGS: NOT AT ALL
SUM OF ALL RESPONSES TO PHQ QUESTIONS 1-9: 1
8. MOVING OR SPEAKING SO SLOWLY THAT OTHER PEOPLE COULD HAVE NOTICED. OR THE OPPOSITE, BEING SO FIGETY OR RESTLESS THAT YOU HAVE BEEN MOVING AROUND A LOT MORE THAN USUAL: NOT AT ALL
10. IF YOU CHECKED OFF ANY PROBLEMS, HOW DIFFICULT HAVE THESE PROBLEMS MADE IT FOR YOU TO DO YOUR WORK, TAKE CARE OF THINGS AT HOME, OR GET ALONG WITH OTHER PEOPLE: NOT DIFFICULT AT ALL
3. TROUBLE FALLING OR STAYING ASLEEP: NOT AT ALL
9. THOUGHTS THAT YOU WOULD BE BETTER OFF DEAD, OR OF HURTING YOURSELF: NOT AT ALL
SUM OF ALL RESPONSES TO PHQ QUESTIONS 1-9: 1
SUM OF ALL RESPONSES TO PHQ QUESTIONS 1-9: 1
6. FEELING BAD ABOUT YOURSELF - OR THAT YOU ARE A FAILURE OR HAVE LET YOURSELF OR YOUR FAMILY DOWN: NOT AT ALL
5. POOR APPETITE OR OVEREATING: NOT AT ALL

## 2024-08-06 ASSESSMENT — EJECTION FRACTION: EF_VALUE: 70

## 2024-08-06 NOTE — CARDIO/PULMONARY
education sessions.    Patient states that he/she would like to accomplish the following by completion of the program:    Intake Start Time:  1000  Intake End Time:   1131    Ronna Stone RN

## 2024-08-13 ENCOUNTER — HOSPITAL ENCOUNTER (OUTPATIENT)
Facility: HOSPITAL | Age: 69
Setting detail: RECURRING SERIES
Discharge: HOME | End: 2024-08-16
Payer: MEDICARE

## 2024-08-13 VITALS — BODY MASS INDEX: 36.01 KG/M2 | WEIGHT: 216.4 LBS

## 2024-08-13 PROCEDURE — 93798 PHYS/QHP OP CAR RHAB W/ECG: CPT

## 2024-08-13 ASSESSMENT — EXERCISE STRESS TEST
PEAK_HR: 132
PEAK_RPE: 11
PEAK_METS: 2

## 2024-08-16 ENCOUNTER — HOSPITAL ENCOUNTER (OUTPATIENT)
Facility: HOSPITAL | Age: 69
Setting detail: RECURRING SERIES
Discharge: HOME | End: 2024-08-19
Payer: MEDICARE

## 2024-08-16 VITALS — WEIGHT: 211 LBS | BODY MASS INDEX: 35.11 KG/M2

## 2024-08-16 PROCEDURE — 93798 PHYS/QHP OP CAR RHAB W/ECG: CPT

## 2024-08-16 ASSESSMENT — EXERCISE STRESS TEST
PEAK_RPE: 11
PEAK_METS: 2
PEAK_HR: 103

## 2024-08-22 ENCOUNTER — HOSPITAL ENCOUNTER (OUTPATIENT)
Facility: HOSPITAL | Age: 69
Setting detail: RECURRING SERIES
Discharge: HOME | End: 2024-08-25
Payer: MEDICARE

## 2024-08-22 VITALS — WEIGHT: 215 LBS | BODY MASS INDEX: 35.78 KG/M2

## 2024-08-22 PROCEDURE — 93798 PHYS/QHP OP CAR RHAB W/ECG: CPT

## 2024-08-22 PROCEDURE — 93797 PHYS/QHP OP CAR RHAB WO ECG: CPT

## 2024-08-22 ASSESSMENT — EXERCISE STRESS TEST
PEAK_HR: 130
PEAK_METS: 2
PEAK_RPE: 11

## 2024-08-22 NOTE — PROGRESS NOTES
Cardiac Rehab Nutrition Assessment- 1:1 Evaluation  2024      NAME: Blanche Mars : 1955 AGE: 69 y.o.  GENDER: female  CARDIAC REHAB ADMITTING DIAGNOSIS: Presence of other heart-valve replacement [Z95.4]    PROBLEM LIST:  Patient Active Problem List   Diagnosis    Osteoarthritis of left knee    Abnormal stress test    Aortic stenosis    Mitral regurgitation    Aortic stenosis, severe    S/P ventricular septal myectomy    S/P left atrial appendage ligation    Complete heart block (HCC)         LABS:   Hemoglobin A1C   Date Value Ref Range Status   2024 5.0 4.0 - 5.6 % Final     Comment:     (NOTE)  HbA1C Interpretive Ranges  <5.7              Normal  5.7 - 6.4         Consider Prediabetes  >6.5              Consider Diabetes       Lab Results   Component Value Date     2024    K 4.5 2024     2024    CO2 29 2024    BUN 26 (H) 2024    CREATININE 0.63 2024    GLUCOSE 96 2024    CALCIUM 8.4 (L) 2024    BILITOT 1.0 2024    ALKPHOS 75 2024    AST 28 2024    ALT 28 2024    LABGLOM >90 2024    GFRAA >60 2020    GLOB 2.9 2024         Lab Results   Component Value Date    CHOL 222 (H) 2024    TRIG 69 2024    HDL 60 2024    .2 (H) 2024    VLDL 13.8 2024    CHOLHDLRATIO 3.7 2024         MEDICATIONS/SUPPLEMENTS:   Scheduled Meds:  Continuous Infusions:  PRN Meds:.  Prior to Admission medications    Medication Sig Start Date End Date Taking? Authorizing Provider   furosemide (LASIX) 20 MG tablet Take 3 tablets by mouth as needed (Take three tablets if there is a three pound weight gain in one day or a five pound weight gain over two days.) 7/15/24   Rosita Villatoro, APRN - NP   potassium chloride (KLOR-CON M) 20 MEQ extended release tablet Take 1 tablet by mouth as needed (Take only with Lasix.) 7/15/24   Rosita Villatoro, RADHA - NP   acetaminophen (TYLENOL)  Term Weight Goal:180 lbs  Long Term Weight Goal:160 lbs    Nutrition Goals:  Daily Recommendations:  Calories: 1300 /day  (for weight loss)  Saturated Fat: no more than 7 g/day  Trans Fat: 0 g/day  Sodium: no more than 2300 mg/day  Fruit: 2-3 cups / day  Vegetables: 3 cups/day    Other:  - read and compare food labels      Keeping a food diary was recommended.            Questions addressed. Follow-up plans discussed. Blanche Mars verbalized understanding.    Yari Estrada RD

## 2024-08-26 ENCOUNTER — HOSPITAL ENCOUNTER (OUTPATIENT)
Facility: HOSPITAL | Age: 69
Setting detail: RECURRING SERIES
Discharge: HOME | End: 2024-08-29
Payer: MEDICARE

## 2024-08-26 VITALS — WEIGHT: 215 LBS | BODY MASS INDEX: 35.78 KG/M2

## 2024-08-26 PROCEDURE — 93798 PHYS/QHP OP CAR RHAB W/ECG: CPT

## 2024-08-26 ASSESSMENT — EXERCISE STRESS TEST
PEAK_RPE: 11
PEAK_HR: 126
PEAK_METS: 2.9

## 2024-08-27 ENCOUNTER — OFFICE VISIT (OUTPATIENT)
Age: 69
End: 2024-08-27
Payer: MEDICARE

## 2024-08-27 VITALS
OXYGEN SATURATION: 96 % | DIASTOLIC BLOOD PRESSURE: 72 MMHG | WEIGHT: 215 LBS | HEIGHT: 65 IN | BODY MASS INDEX: 35.82 KG/M2 | HEART RATE: 76 BPM | SYSTOLIC BLOOD PRESSURE: 120 MMHG

## 2024-08-27 DIAGNOSIS — I35.0 NONRHEUMATIC AORTIC VALVE STENOSIS: Primary | ICD-10-CM

## 2024-08-27 DIAGNOSIS — Z95.2 S/P MVR (MITRAL VALVE REPLACEMENT): ICD-10-CM

## 2024-08-27 DIAGNOSIS — Z98.890 S/P LEFT ATRIAL APPENDAGE LIGATION: ICD-10-CM

## 2024-08-27 DIAGNOSIS — I10 PRIMARY HYPERTENSION: ICD-10-CM

## 2024-08-27 DIAGNOSIS — I89.0 LYMPHEDEMA: ICD-10-CM

## 2024-08-27 DIAGNOSIS — Z98.890 S/P VENTRICULAR SEPTAL MYECTOMY: ICD-10-CM

## 2024-08-27 PROCEDURE — 93005 ELECTROCARDIOGRAM TRACING: CPT | Performed by: SPECIALIST

## 2024-08-27 PROCEDURE — 1036F TOBACCO NON-USER: CPT | Performed by: SPECIALIST

## 2024-08-27 PROCEDURE — 99215 OFFICE O/P EST HI 40 MIN: CPT | Performed by: SPECIALIST

## 2024-08-27 PROCEDURE — 3017F COLORECTAL CA SCREEN DOC REV: CPT | Performed by: SPECIALIST

## 2024-08-27 PROCEDURE — 1090F PRES/ABSN URINE INCON ASSESS: CPT | Performed by: SPECIALIST

## 2024-08-27 PROCEDURE — 3078F DIAST BP <80 MM HG: CPT | Performed by: SPECIALIST

## 2024-08-27 PROCEDURE — G8427 DOCREV CUR MEDS BY ELIG CLIN: HCPCS | Performed by: SPECIALIST

## 2024-08-27 PROCEDURE — G8400 PT W/DXA NO RESULTS DOC: HCPCS | Performed by: SPECIALIST

## 2024-08-27 PROCEDURE — G8417 CALC BMI ABV UP PARAM F/U: HCPCS | Performed by: SPECIALIST

## 2024-08-27 PROCEDURE — 93010 ELECTROCARDIOGRAM REPORT: CPT | Performed by: SPECIALIST

## 2024-08-27 PROCEDURE — 1123F ACP DISCUSS/DSCN MKR DOCD: CPT | Performed by: SPECIALIST

## 2024-08-27 PROCEDURE — 3074F SYST BP LT 130 MM HG: CPT | Performed by: SPECIALIST

## 2024-08-27 NOTE — PROGRESS NOTES
Chief Complaint   Patient presents with    Other     Mitral Valve Replacement   PM 6/13 Hyde     Vitals:    08/27/24 1458   BP: 120/72   Site: Left Upper Arm   Position: Sitting   Pulse: 76   SpO2: 96%   Weight: 97.5 kg (215 lb)   Height: 1.651 m (5' 5\")         Chest pain: DENIED     Recent hospital stays: DENIED     Refills: DENIED   
abdominal pain, diarrhea, melena.   Genitourinary: Negative for dysuria  Musculoskeletal: Negative for myalgias.   Skin: Negative for rash  Heme: No problems bleeding.  Neurological: Negative for speech change and focal weakness.       PHYSICAL EXAM:     Physical Exam:  /72 (Site: Left Upper Arm, Position: Sitting)   Pulse 76   Ht 1.651 m (5' 5\")   Wt 97.5 kg (215 lb)   LMP  (LMP Unknown)   SpO2 96%   BMI 35.78 kg/m²     Patient appears generally well, mood and affect are appropriate and pleasant.  HEENT:  Hearing intact, non-icteric, normocephalic, atraumatic.   Neck Exam: Supple  Lung Exam: Clear to auscultation, even breath sounds.   Cardiac Exam: Regular rate and rhythm with 2/6 systolic murmur RSB  Abdomen: Soft, non-tender  Extremities: Moves all ext well. + lymphedema, hose  MSKTL: Overall good ROM ext  Skin: No significant rashes  Psych: Appropriate affect  Neuro - Grossly intact        LABS / OTHER STUDIES:     Lab Results   Component Value Date     06/20/2024    K 4.5 06/20/2024     06/20/2024    CO2 29 06/20/2024    BUN 26 (H) 06/20/2024    CREATININE 0.63 06/20/2024    GLUCOSE 96 06/20/2024    CALCIUM 8.4 (L) 06/20/2024    BILITOT 1.0 06/20/2024    ALKPHOS 75 06/20/2024    AST 28 06/20/2024    ALT 28 06/20/2024    LABGLOM >90 06/20/2024    GFRAA >60 01/16/2020    GLOB 2.9 06/20/2024       Lab Results   Component Value Date    WBC 7.2 06/20/2024    HGB 10.1 (L) 06/20/2024    HCT 30.1 (L) 06/20/2024    .7 (H) 06/20/2024     06/20/2024       Lab Results   Component Value Date    TSH 2.78 06/05/2024     Lab Results   Component Value Date    CHOL 222 (H) 01/23/2024    TRIG 69 01/23/2024    HDL 60 01/23/2024    .2 (H) 01/23/2024    VLDL 13.8 01/23/2024    CHOLHDLRATIO 3.7 01/23/2024           CARDIAC DIAGNOSTICS:     Cardiac Evaluation Includes:  I reviewed the test results below.      EKG 8/27/24 - V paced       Cardiac Cath 1/22/24 - mild luminal irregs, mod

## 2024-08-28 NOTE — CARDIO/PULMONARY
[facility]- Program Discharge  Pt enrolled in [facility] Phase II Cardiac Rehab.  Intake date was:  8/6/24  Pt participated in  7 exercise/education sessions.  The last session was on:  8/26/2024  Pt requested to be discharged from program after 7 sessions.   ASHA Blank

## 2024-09-16 ENCOUNTER — PROCEDURE VISIT (OUTPATIENT)
Age: 69
End: 2024-09-16

## 2024-09-16 ENCOUNTER — PATIENT MESSAGE (OUTPATIENT)
Age: 69
End: 2024-09-16

## 2024-09-16 ENCOUNTER — OFFICE VISIT (OUTPATIENT)
Age: 69
End: 2024-09-16
Payer: MEDICARE

## 2024-09-16 VITALS
HEART RATE: 84 BPM | OXYGEN SATURATION: 95 % | HEIGHT: 65 IN | SYSTOLIC BLOOD PRESSURE: 122 MMHG | DIASTOLIC BLOOD PRESSURE: 78 MMHG | BODY MASS INDEX: 34.69 KG/M2 | WEIGHT: 208.2 LBS

## 2024-09-16 DIAGNOSIS — Z95.0 CARDIAC PACEMAKER IN SITU: ICD-10-CM

## 2024-09-16 DIAGNOSIS — Z98.890 S/P LEFT ATRIAL APPENDAGE LIGATION: ICD-10-CM

## 2024-09-16 DIAGNOSIS — I44.2 COMPLETE HEART BLOCK (HCC): ICD-10-CM

## 2024-09-16 DIAGNOSIS — Z95.0 CARDIAC PACEMAKER IN SITU: Primary | ICD-10-CM

## 2024-09-16 DIAGNOSIS — I34.0 MITRAL VALVE INSUFFICIENCY, UNSPECIFIED ETIOLOGY: Primary | ICD-10-CM

## 2024-09-16 DIAGNOSIS — I10 PRIMARY HYPERTENSION: ICD-10-CM

## 2024-09-16 DIAGNOSIS — Z95.2 S/P MVR (MITRAL VALVE REPLACEMENT): ICD-10-CM

## 2024-09-16 DIAGNOSIS — Z98.890 S/P VENTRICULAR SEPTAL MYECTOMY: ICD-10-CM

## 2024-09-16 PROCEDURE — G8427 DOCREV CUR MEDS BY ELIG CLIN: HCPCS

## 2024-09-16 PROCEDURE — 99214 OFFICE O/P EST MOD 30 MIN: CPT

## 2024-09-16 PROCEDURE — 1090F PRES/ABSN URINE INCON ASSESS: CPT

## 2024-09-16 PROCEDURE — 3074F SYST BP LT 130 MM HG: CPT

## 2024-09-16 PROCEDURE — 1123F ACP DISCUSS/DSCN MKR DOCD: CPT

## 2024-09-16 PROCEDURE — G8400 PT W/DXA NO RESULTS DOC: HCPCS

## 2024-09-16 PROCEDURE — 3078F DIAST BP <80 MM HG: CPT

## 2024-09-16 PROCEDURE — G8417 CALC BMI ABV UP PARAM F/U: HCPCS

## 2024-09-16 PROCEDURE — 3017F COLORECTAL CA SCREEN DOC REV: CPT

## 2024-09-16 PROCEDURE — 1036F TOBACCO NON-USER: CPT

## 2024-09-17 ENCOUNTER — TELEPHONE (OUTPATIENT)
Age: 69
End: 2024-09-17

## 2024-09-19 ENCOUNTER — TELEPHONE (OUTPATIENT)
Age: 69
End: 2024-09-19

## 2024-09-19 NOTE — TELEPHONE ENCOUNTER
Follow up call received from Keya with [facility]. Would like to know if the patient is able to discontinue Warfarin. Patient came into the office today stating that she was told she was would be able to stop the medication at some point.      Requests a call back at [phone]

## 2024-10-13 PROCEDURE — 93294 REM INTERROG EVL PM/LDLS PM: CPT | Performed by: INTERNAL MEDICINE

## 2024-12-03 ENCOUNTER — PROCEDURE VISIT (OUTPATIENT)
Age: 69
End: 2024-12-03
Payer: MEDICARE

## 2024-12-03 ENCOUNTER — OFFICE VISIT (OUTPATIENT)
Age: 69
End: 2024-12-03
Payer: MEDICARE

## 2024-12-03 VITALS
BODY MASS INDEX: 35.82 KG/M2 | OXYGEN SATURATION: 96 % | SYSTOLIC BLOOD PRESSURE: 124 MMHG | HEIGHT: 65 IN | HEART RATE: 96 BPM | WEIGHT: 215 LBS | DIASTOLIC BLOOD PRESSURE: 60 MMHG

## 2024-12-03 DIAGNOSIS — Z95.2 S/P MVR (MITRAL VALVE REPLACEMENT): ICD-10-CM

## 2024-12-03 DIAGNOSIS — I44.2 COMPLETE HEART BLOCK (HCC): Primary | ICD-10-CM

## 2024-12-03 DIAGNOSIS — Z95.0 CARDIAC PACEMAKER IN SITU: Primary | ICD-10-CM

## 2024-12-03 DIAGNOSIS — I48.0 PAF (PAROXYSMAL ATRIAL FIBRILLATION) (HCC): ICD-10-CM

## 2024-12-03 DIAGNOSIS — Z95.0 CARDIAC PACEMAKER IN SITU: ICD-10-CM

## 2024-12-03 DIAGNOSIS — I34.0 MITRAL VALVE INSUFFICIENCY, UNSPECIFIED ETIOLOGY: ICD-10-CM

## 2024-12-03 DIAGNOSIS — Z98.890 S/P VENTRICULAR SEPTAL MYECTOMY: ICD-10-CM

## 2024-12-03 PROCEDURE — 93005 ELECTROCARDIOGRAM TRACING: CPT | Performed by: INTERNAL MEDICINE

## 2024-12-03 PROCEDURE — 93280 PM DEVICE PROGR EVAL DUAL: CPT | Performed by: INTERNAL MEDICINE

## 2024-12-03 PROCEDURE — 99214 OFFICE O/P EST MOD 30 MIN: CPT | Performed by: INTERNAL MEDICINE

## 2024-12-03 ASSESSMENT — PATIENT HEALTH QUESTIONNAIRE - PHQ9
2. FEELING DOWN, DEPRESSED OR HOPELESS: NOT AT ALL
SUM OF ALL RESPONSES TO PHQ QUESTIONS 1-9: 0
SUM OF ALL RESPONSES TO PHQ QUESTIONS 1-9: 0
1. LITTLE INTEREST OR PLEASURE IN DOING THINGS: NOT AT ALL
SUM OF ALL RESPONSES TO PHQ9 QUESTIONS 1 & 2: 0
SUM OF ALL RESPONSES TO PHQ QUESTIONS 1-9: 0
SUM OF ALL RESPONSES TO PHQ QUESTIONS 1-9: 0

## 2024-12-03 NOTE — PROGRESS NOTES
Cardiac Electrophysiology Office Follow-up    NAME:  Blanche Mars   :  1955  MRM:  [id number]    Date:  12/3/2024     Primary Cardiologist: Masoud    Assessment and Plan:     1. Complete heart block (HCC)  -     EKG 12 Lead  2. Cardiac pacemaker in situ  -     EKG 12 Lead  3. Mitral valve insufficiency, unspecified etiology  -     EKG 12 Lead  4. S/P MVR (mitral valve replacement)  -     EKG 12 Lead  5. S/P ventricular septal myectomy  -     EKG 12 Lead  6. PAF (paroxysmal atrial fibrillation) (HCC)       Complete heart block following MVR and septal myectomy 24  - No evidence of underlying conduction  - Not on beta blocker  - Given surgery, unlikely intrinsic conduction will recover, PPM recommend.   - S/p PPM implant on 24, see device interrogation below.  - FU with EP in 1 year    Medtronic dual chamber pacemaker (DOI 2024): Device check today shows proper lead & generator function. Generator longevity estimated 12 years. RA 2.5% %. AT/AF burden 3%. No sustained VT/VF.  Iterative programming was performed to test the leads sensing and threshold parameters without any permanent changes. AF/FL 3.8% but due to oversensing  - RA sensitivity changed from 0.15 to 0.3, partial plus turned on  - Continue remote device transmissions every 3 months    Paroxysmal atrial fibrillation: noted to have AF burden of 36% at 2 week wound/device check. Now burden down to 3% with last episode on .   - some degree of oversensing as the reported AF  - history of AMARA closure  - schedule for SHALONDA to assess AMARA clip  - ok to hold off on OAC at this time     Mitral regurgitation/LV hypertrophy  - S/p tissue MVR, AMARA ligation and septal myectomy 24    Hypertension: BP is well controlled on the current regimen. No change in antihypertensive medications today. Recommended routine exercise and low sodium diet.    Patient is an established/new patient with plan for longitudinal relationship and ongoing

## 2024-12-03 NOTE — PROGRESS NOTES
1. Have you been to the ER, urgent care clinic since your last visit?  Hospitalized since your last visit?No    2. Have you seen or consulted any other health care providers outside of the [facility] since your last visit?  Include any pap smears or colon screening. Yes [facility], located within [facility] Primary Care Provider

## 2024-12-04 ENCOUNTER — TELEPHONE (OUTPATIENT)
Age: 69
End: 2024-12-04

## 2024-12-04 NOTE — TELEPHONE ENCOUNTER
Spoke with Pt of SHALONDA story for 12/23/2024  Check in at the first floor Outpt /reg. Desk.   Pt Needs a  must stay on site  Pt is to be NPO from midnight the night before.     Medications:   Okay to take     VO by /nurse: Trena LAGUERRE

## 2024-12-05 DIAGNOSIS — Z95.0 CARDIAC PACEMAKER IN SITU: ICD-10-CM

## 2024-12-05 DIAGNOSIS — Z98.890 S/P VENTRICULAR SEPTAL MYECTOMY: ICD-10-CM

## 2024-12-05 DIAGNOSIS — I44.2 COMPLETE HEART BLOCK (HCC): ICD-10-CM

## 2024-12-05 DIAGNOSIS — Z95.2 S/P MVR (MITRAL VALVE REPLACEMENT): ICD-10-CM

## 2024-12-05 DIAGNOSIS — I34.0 MITRAL VALVE INSUFFICIENCY, UNSPECIFIED ETIOLOGY: ICD-10-CM

## 2024-12-05 DIAGNOSIS — I48.0 PAF (PAROXYSMAL ATRIAL FIBRILLATION) (HCC): ICD-10-CM

## 2024-12-06 LAB
ANION GAP SERPL CALC-SCNC: 6 MMOL/L (ref 2–12)
BASOPHILS # BLD: 0 K/UL (ref 0–0.1)
BASOPHILS NFR BLD: 1 % (ref 0–1)
BUN SERPL-MCNC: 18 MG/DL (ref 6–20)
BUN/CREAT SERPL: 28 (ref 12–20)
CALCIUM SERPL-MCNC: 9.4 MG/DL (ref 8.5–10.1)
CHLORIDE SERPL-SCNC: 107 MMOL/L (ref 97–108)
CO2 SERPL-SCNC: 26 MMOL/L (ref 21–32)
CREAT SERPL-MCNC: 0.64 MG/DL (ref 0.55–1.02)
DIFFERENTIAL METHOD BLD: ABNORMAL
EOSINOPHIL # BLD: 0.1 K/UL (ref 0–0.4)
EOSINOPHIL NFR BLD: 2 % (ref 0–7)
ERYTHROCYTE [DISTWIDTH] IN BLOOD BY AUTOMATED COUNT: 15.1 % (ref 11.5–14.5)
GLUCOSE SERPL-MCNC: 88 MG/DL (ref 65–100)
HCT VFR BLD AUTO: 41.3 % (ref 35–47)
HGB BLD-MCNC: 13.5 G/DL (ref 11.5–16)
IMM GRANULOCYTES # BLD AUTO: 0 K/UL (ref 0–0.04)
IMM GRANULOCYTES NFR BLD AUTO: 0 % (ref 0–0.5)
LYMPHOCYTES # BLD: 1.5 K/UL (ref 0.8–3.5)
LYMPHOCYTES NFR BLD: 36 % (ref 12–49)
MCH RBC QN AUTO: 32.2 PG (ref 26–34)
MCHC RBC AUTO-ENTMCNC: 32.7 G/DL (ref 30–36.5)
MCV RBC AUTO: 98.6 FL (ref 80–99)
MONOCYTES # BLD: 0.5 K/UL (ref 0–1)
MONOCYTES NFR BLD: 11 % (ref 5–13)
NEUTS SEG # BLD: 2.1 K/UL (ref 1.8–8)
NEUTS SEG NFR BLD: 50 % (ref 32–75)
NRBC # BLD: 0 K/UL (ref 0–0.01)
NRBC BLD-RTO: 0 PER 100 WBC
PLATELET # BLD AUTO: 156 K/UL (ref 150–400)
PMV BLD AUTO: 10.1 FL (ref 8.9–12.9)
POTASSIUM SERPL-SCNC: 4.1 MMOL/L (ref 3.5–5.1)
RBC # BLD AUTO: 4.19 M/UL (ref 3.8–5.2)
SODIUM SERPL-SCNC: 139 MMOL/L (ref 136–145)
WBC # BLD AUTO: 4.2 K/UL (ref 3.6–11)

## 2024-12-10 DIAGNOSIS — I48.0 PAF (PAROXYSMAL ATRIAL FIBRILLATION) (HCC): Primary | ICD-10-CM

## 2024-12-10 RX ORDER — SODIUM CHLORIDE 0.9 % (FLUSH) 0.9 %
5-40 SYRINGE (ML) INJECTION PRN
OUTPATIENT
Start: 2024-12-23

## 2024-12-10 RX ORDER — SODIUM CHLORIDE 0.9 % (FLUSH) 0.9 %
5-40 SYRINGE (ML) INJECTION EVERY 12 HOURS SCHEDULED
OUTPATIENT
Start: 2024-12-23

## 2024-12-23 ENCOUNTER — HOSPITAL ENCOUNTER (OUTPATIENT)
Facility: HOSPITAL | Age: 69
Discharge: HOME | End: 2024-12-23
Attending: SPECIALIST | Admitting: SPECIALIST
Payer: MEDICARE

## 2024-12-23 VITALS
OXYGEN SATURATION: 100 % | DIASTOLIC BLOOD PRESSURE: 83 MMHG | HEART RATE: 74 BPM | BODY MASS INDEX: 35.82 KG/M2 | RESPIRATION RATE: 14 BRPM | SYSTOLIC BLOOD PRESSURE: 134 MMHG | WEIGHT: 215 LBS | HEIGHT: 65 IN

## 2024-12-23 DIAGNOSIS — Z95.818 PRESENCE OF LEFT ATRIAL APPENDAGE CLOSURE DEVICE: ICD-10-CM

## 2024-12-23 LAB
ECHO AV MEAN GRADIENT: 18 MMHG
ECHO AV MEAN VELOCITY: 2.1 M/S
ECHO AV PEAK GRADIENT: 30 MMHG
ECHO AV PEAK VELOCITY: 2.7 M/S
ECHO AV VELOCITY RATIO: 0.41
ECHO AV VTI: 54.9 CM
ECHO BSA: 2.11 M2
ECHO LV EF PHYSICIAN: 70 %
ECHO LVOT AV VTI INDEX: 0.43
ECHO LVOT MEAN GRADIENT: 3 MMHG
ECHO LVOT PEAK GRADIENT: 5 MMHG
ECHO LVOT PEAK VELOCITY: 1.1 M/S
ECHO LVOT VTI: 23.7 CM

## 2024-12-23 PROCEDURE — 99152 MOD SED SAME PHYS/QHP 5/>YRS: CPT

## 2024-12-23 PROCEDURE — 93320 DOPPLER ECHO COMPLETE: CPT

## 2024-12-23 PROCEDURE — 6370000000 HC RX 637 (ALT 250 FOR IP): Performed by: SPECIALIST

## 2024-12-23 PROCEDURE — 6360000002 HC RX W HCPCS: Performed by: SPECIALIST

## 2024-12-23 RX ORDER — FENTANYL CITRATE 50 UG/ML
INJECTION, SOLUTION INTRAMUSCULAR; INTRAVENOUS PRN
Status: COMPLETED | OUTPATIENT
Start: 2024-12-23 | End: 2024-12-23

## 2024-12-23 RX ORDER — LIDOCAINE 50 MG/G
OINTMENT TOPICAL PRN
Status: COMPLETED | OUTPATIENT
Start: 2024-12-23 | End: 2024-12-23

## 2024-12-23 RX ORDER — LIDOCAINE HYDROCHLORIDE 20 MG/ML
SOLUTION OROPHARYNGEAL PRN
Status: COMPLETED | OUTPATIENT
Start: 2024-12-23 | End: 2024-12-23

## 2024-12-23 RX ORDER — LIDOCAINE HYDROCHLORIDE 20 MG/ML
JELLY TOPICAL PRN
Status: COMPLETED | OUTPATIENT
Start: 2024-12-23 | End: 2024-12-23

## 2024-12-23 RX ORDER — MIDAZOLAM HYDROCHLORIDE 1 MG/ML
INJECTION, SOLUTION INTRAMUSCULAR; INTRAVENOUS PRN
Status: COMPLETED | OUTPATIENT
Start: 2024-12-23 | End: 2024-12-23

## 2024-12-23 RX ADMIN — LIDOCAINE HYDROCHLORIDE 15 ML: 20 SOLUTION ORAL at 12:42

## 2024-12-23 RX ADMIN — FENTANYL CITRATE 25 MCG: 50 INJECTION, SOLUTION INTRAMUSCULAR; INTRAVENOUS at 13:08

## 2024-12-23 RX ADMIN — LIDOCAINE 5 ML: 50 OINTMENT TOPICAL at 12:50

## 2024-12-23 RX ADMIN — MIDAZOLAM HYDROCHLORIDE 2 MG: 1 INJECTION, SOLUTION INTRAMUSCULAR; INTRAVENOUS at 13:05

## 2024-12-23 RX ADMIN — FENTANYL CITRATE 25 MCG: 50 INJECTION, SOLUTION INTRAMUSCULAR; INTRAVENOUS at 13:05

## 2024-12-23 RX ADMIN — LIDOCAINE HYDROCHLORIDE 5 ML: 20 JELLY TOPICAL at 13:08

## 2024-12-23 RX ADMIN — MIDAZOLAM HYDROCHLORIDE 2 MG: 1 INJECTION, SOLUTION INTRAMUSCULAR; INTRAVENOUS at 13:07

## 2024-12-23 NOTE — PROGRESS NOTES
Discharge instructions reviewed with patient and daughter. Allowed adequate time to ask questions, all questions answered. Printed copy of AVS given to patient. All belongings gathered, IV and tele discontinued. Transported via wheelchair to main entrance and into care of family.

## 2024-12-23 NOTE — H&P
9.4 12/05/2024    BILITOT 1.0 06/20/2024    ALKPHOS 75 06/20/2024    AST 28 06/20/2024    ALT 28 06/20/2024    LABGLOM >90 12/05/2024    GFRAA >60 01/16/2020    GLOB 2.9 06/20/2024       Lab Results   Component Value Date    WBC 4.2 12/05/2024    HGB 13.5 12/05/2024    HCT 41.3 12/05/2024    MCV 98.6 12/05/2024     12/05/2024       Lab Results   Component Value Date    TSH 2.78 06/05/2024     Lab Results   Component Value Date    CHOL 222 (H) 01/23/2024    TRIG 69 01/23/2024    HDL 60 01/23/2024    .2 (H) 01/23/2024    VLDL 13.8 01/23/2024    CHOLHDLRATIO 3.7 01/23/2024           CARDIAC DIAGNOSTICS:     Cardiac Evaluation Includes:  I reviewed the test results below.      EKG 8/27/24 - V paced       Cardiac Cath 1/22/24 - mild luminal irregs, mod AS    Echo 3/29/24 - LVEF 65-70%, Mild to moderate stenosis of the aortic valve. AV mean gradient is 16 mmHg. Significant LVOT outflow gradient with peak late systolic velocity of 5 m/sec.   Mitral Valve: Thickened leaflet. Moderate to severely calcified leaflet involving P2 and P3 scallops. Moderate to severe paravalvular regurgitation by color.     CT surgery 6/11/24 - bioprosthetic MVR (#25 Linton Mitris), AMARA ligation with clip, Septal myectomy via transaortic approach    Echo 6/13/24- LVEF 70-75%, Likely mild AS, stable prosthetic MVR    PPM placed 6/13/24     Echo 6/15/24 - LVEF 70-75%.  No obvious LVOT obstruction at rest.  Mild to moderate Aortic stenosis - AV mean PG 20 mmHg.  Mitral Valve: Bioprosthetic valve that is rocking without any evidence of paravalvular leak. MV mean gradient is 5 mmHg at a heart rate of 60 bpm.        ASSESSMENT AND PLAN:     Assessment and Plan:    1) s/p MVR and myectomy (for MR and LVOT obstruction)   - CT surgery 6/11/24 - bioprosthetic MVR (#25 Linton Mitris), AMARA ligation with clip, Septal myectomy via transaortic approach  - Echo 6/15/24 with stable findings   - completed 3 month course of coumadin per CT surgery

## 2025-04-13 PROCEDURE — 93296 REM INTERROG EVL PM/IDS: CPT | Performed by: INTERNAL MEDICINE

## 2025-04-13 PROCEDURE — 93294 REM INTERROG EVL PM/LDLS PM: CPT | Performed by: INTERNAL MEDICINE

## 2025-05-27 ENCOUNTER — OFFICE VISIT (OUTPATIENT)
Age: 70
End: 2025-05-27
Payer: MEDICARE

## 2025-05-27 VITALS
HEIGHT: 65 IN | DIASTOLIC BLOOD PRESSURE: 80 MMHG | SYSTOLIC BLOOD PRESSURE: 132 MMHG | HEART RATE: 86 BPM | OXYGEN SATURATION: 97 % | WEIGHT: 226 LBS | BODY MASS INDEX: 37.65 KG/M2

## 2025-05-27 DIAGNOSIS — I35.0 NONRHEUMATIC AORTIC VALVE STENOSIS: Primary | ICD-10-CM

## 2025-05-27 DIAGNOSIS — E78.2 MIXED DYSLIPIDEMIA: ICD-10-CM

## 2025-05-27 DIAGNOSIS — Z95.2 S/P MVR (MITRAL VALVE REPLACEMENT): ICD-10-CM

## 2025-05-27 PROCEDURE — 1126F AMNT PAIN NOTED NONE PRSNT: CPT | Performed by: SPECIALIST

## 2025-05-27 PROCEDURE — 1159F MED LIST DOCD IN RCRD: CPT | Performed by: SPECIALIST

## 2025-05-27 PROCEDURE — 99214 OFFICE O/P EST MOD 30 MIN: CPT | Performed by: SPECIALIST

## 2025-05-27 PROCEDURE — 3075F SYST BP GE 130 - 139MM HG: CPT | Performed by: SPECIALIST

## 2025-05-27 PROCEDURE — 1123F ACP DISCUSS/DSCN MKR DOCD: CPT | Performed by: SPECIALIST

## 2025-05-27 PROCEDURE — 1160F RVW MEDS BY RX/DR IN RCRD: CPT | Performed by: SPECIALIST

## 2025-05-27 PROCEDURE — 3079F DIAST BP 80-89 MM HG: CPT | Performed by: SPECIALIST

## 2025-05-27 NOTE — PROGRESS NOTES
Leah Meredith MD. [facility]          Patient: Blanche Mars  : 1955      Today's Date: 2025        HISTORY OF PRESENT ILLNESS:     History of Present Illness:    Procedures   2024 with Dr. Miller:  1. Tissue Mitral Valve Replacement #25 Linton Mitris.  2. Epiaortic Ultrasound.  3. Ligation of AMARA with 50 mm clip.  4. Septal myectomy via transaortic approach.     24 with Dr. Hyde:  Successful implantation of a Medtronic dual chamber Pacemaker. (Shanta™ XT DR VIDAL Stephens )      No sig cardiac issues.  Some DASH still.  But overall feels OK.         PAST MEDICAL HISTORY:     Past Medical History:   Diagnosis Date    Anxiety     Aortic stenosis     Hyperlipidemia     Hypertension     Lymphedema     bilateral LE    Mitral regurgitation     RA (rheumatoid arthritis) ([facility])     S/P MVR (mitral valve replacement)        Past Surgical History:   Procedure Laterality Date    CARDIAC PROCEDURE N/A 2024    Left heart cath / coronary angiography performed by Willian Hogue MD at [facility] CARDIAC CATH LAB    COLONOSCOPY      EP DEVICE PROCEDURE N/A 2024    Insert PPM dual performed by Beti Hyde MD at [facility] CARDIAC CATH LAB    JOINT REPLACEMENT Left 2020    MITRAL VALVE REPLACEMENT N/A 2024    MITRAL VALVE REPLACEMENT WITH 25MM BIOPROSTHETIC VALVE AND EXTENSIVE DEBULKING USING ULTRASONIC ASPIRATION; SEPTAL MYECTOMY; LEFT ATRIAL APPENDAGE LIGATION WITH 50MM ATRICLIP; ECC; SHALONDA & EPIAORTIC U/S BY DR. SIMMS performed by Saroj Miller MD at [facility] OPEN HEART    TONSILLECTOMY               CURRENT MEDICATIONS:    .  Current Outpatient Medications   Medication Sig Dispense Refill    furosemide (LASIX) 20 MG tablet Take 3 tablets by mouth as needed (Take three tablets if there is a three pound weight gain in one day or a five pound weight gain over two days.) 60 tablet 0    potassium chloride (KLOR-CON M) 20 MEQ extended release tablet Take 1 tablet by mouth as needed (Take only with

## 2025-05-27 NOTE — PROGRESS NOTES
Chief Complaint   Patient presents with    9 mo f/u    Hypertension     S/p MVR     Vitals:    05/27/25 0940   BP: 132/80   BP Site: Left Upper Arm   Patient Position: Sitting   BP Cuff Size: Large Adult   Pulse: 86   SpO2: 97%   Weight: 102.5 kg (226 lb)   Height: 1.651 m (5' 5\")       Chest pain NO     ER, urgent care, or hospitalized outside of [facility] since your last visit?  NO     Refills NO

## 2025-05-27 NOTE — PATIENT INSTRUCTIONS
Patient Education        Learning About the Mediterranean Diet  What is the Mediterranean diet?     The Mediterranean diet is a style of eating rather than a diet plan. It features foods eaten in [place], [place], [place], and other countries along the [place] Sea. It emphasizes eating foods like fish, fruits, vegetables, beans, high-fiber breads and whole grains, nuts, and olive oil. This style of eating includes limited red meat, cheese, and sweets.  Why choose the Mediterranean diet?  A Mediterranean-style diet may improve heart health. It contains more fat than other heart-healthy diets. But the fats are mainly from nuts, unsaturated oils (such as fish oils and olive oil), and certain nut or seed oils (such as canola, soybean, or flaxseed oil). These fats may help protect the heart and blood vessels.  How can you get started on the Mediterranean diet?  Here are some things you can do to switch to a more Mediterranean way of eating.  What to eat  Eat a variety of fruits and vegetables each day, such as grapes, blueberries, tomatoes, broccoli, peppers, figs, olives, spinach, eggplant, beans, lentils, and chickpeas.  Eat a variety of whole-grain foods each day, such as oats, brown rice, and whole wheat bread, pasta, and couscous.  Eat fish at least 2 times a week. Try tuna, salmon, mackerel, lake trout, herring, or sardines.  Eat moderate amounts of low-fat dairy products, such as milk, cheese, or yogurt.  Eat moderate amounts of poultry and eggs.  Choose healthy (unsaturated) fats, such as nuts, olive oil, and certain nut or seed oils like canola, soybean, and flaxseed.  Limit unhealthy (saturated) fats, such as butter, palm oil, and coconut oil. And limit fats found in animal products, such as meat and dairy products made with whole milk. Try to eat red meat only a few times a month in very small amounts.  Limit sweets and desserts to only a few times a week. This includes sugar-sweetened

## 2025-07-13 PROCEDURE — 93294 REM INTERROG EVL PM/LDLS PM: CPT | Performed by: INTERNAL MEDICINE

## (undated) DEVICE — Device: Brand: JELCO

## (undated) DEVICE — PROVE COVER: Brand: UNBRANDED

## (undated) DEVICE — 72" ARTERIAL PRESSURE TUBING: Brand: ICU MEDICAL

## (undated) DEVICE — SOLUTION IRRIG 3000ML 0.9% SOD CHL FLX CONT 0797208] ICU MEDICAL INC]

## (undated) DEVICE — SYRINGE MED 50ML LUERLOCK TIP

## (undated) DEVICE — Device

## (undated) DEVICE — SUMP PERICARD AD 20FR WT TIP VERSATILE DSGN MULT PRT VENT

## (undated) DEVICE — REM POLYHESIVE ADULT PATIENT RETURN ELECTRODE: Brand: VALLEYLAB

## (undated) DEVICE — ZIMMER® STERILE DISPOSABLE TOURNIQUET CUFF WITH PLC, DUAL PORT, SINGLE BLADDER, 34 IN. (86 CM)

## (undated) DEVICE — 3M™ IOBAN™ 2 ANTIMICROBIAL INCISE DRAPE 6651EZ: Brand: IOBAN™ 2

## (undated) DEVICE — SUTURE PERMAHAND SZ 0 L30IN NONABSORBABLE BLK L26MM SH 1/2 K834H

## (undated) DEVICE — CANNULA SUCT TIP L8MM DIA24FR INTCARD RIG SUC 0.25IN CONN

## (undated) DEVICE — SOLUTION IRRIG 1000ML STRL H2O USP PLAS POUR BTL

## (undated) DEVICE — SYR 20ML LL STRL LF --

## (undated) DEVICE — STRIP SKIN CLSR W0.25XL4IN WHT SPUNBOUND FBR NYL HI ADH

## (undated) DEVICE — SUTURE SZ 7 L18IN NONABSORBABLE SIL CCS L48MM 1/2 CIR STRNM M655G

## (undated) DEVICE — TRANSFER BAG 300 ML: Brand: HAEMONETICS

## (undated) DEVICE — BANDAGE COMPR M W6INXL10YD WHT BGE VELC E MTRX HK AND LOOP

## (undated) DEVICE — SYRINGE MED 10ML LUERLOCK TIP W/O SFTY DISP

## (undated) DEVICE — COR-KNOT® QUICK LOAD® 6-POUCH: Brand: COR-KNOT® QUICK LOAD®

## (undated) DEVICE — DRESSING ANTIMIC FOAM OPTIFOAM POSTOP ADH 4 X 6 IN

## (undated) DEVICE — SOLUTION IV 500ML 0.9% SOD CHL PH 5 INJ USP VIAFLX PLAS

## (undated) DEVICE — TEMP PACING WIRE: Brand: MYO/WIRE

## (undated) DEVICE — SUTURE MONOCRYL SZ 3-0 L27IN ABSRB UD PS-2 3/8 CIR REV CUT NDL MCP427H

## (undated) DEVICE — SUTURE N ABSRB L 36 IN SZ 5-0 NDL L 13 MM POLYPRO OR PPL BLU

## (undated) DEVICE — DRAPE,EXTREMITY,89X128,STERILE: Brand: MEDLINE

## (undated) DEVICE — LEAD PACE L475MM CHNL A OR V MYOCARDIAL STEROID ELUT SIL

## (undated) DEVICE — SUTURE EB EXC 5GRN/5WHT 30IN 2-0 DA V-7 10PLG

## (undated) DEVICE — TUBING SUCT 12FR MAL ALUM SHFT FN CAP VENT UNIV CONN W/ OBT

## (undated) DEVICE — PADDING CAST SPEC 6INX4YD COT --

## (undated) DEVICE — 6 FOOT DISPOSABLE EXTENSION CABLE WITH SAFE CONNECT / SCREW-DOWN

## (undated) DEVICE — SYRINGE IRRIG 60ML SFT PLIABLE BLB EZ TO GRP 1 HND USE W/

## (undated) DEVICE — STERILE POLYISOPRENE POWDER-FREE SURGICAL GLOVES WITH EMOLLIENT COATING: Brand: PROTEXIS

## (undated) DEVICE — EZ GLIDE AORTIC CANNULA: Brand: EDWARDS LIFESCIENCES EZ GLIDE AORTIC CANNULA

## (undated) DEVICE — INTENDED FOR TISSUE SEPARATION, AND OTHER PROCEDURES THAT REQUIRE A SHARP SURGICAL BLADE TO PUNCTURE OR CUT.: Brand: BARD-PARKER ® CARBON RIB-BACK BLADES

## (undated) DEVICE — (D)PREP SKN CHLRAPRP APPL 26ML -- CONVERT TO ITEM 371833

## (undated) DEVICE — SUTURE ETHIBOND EXCEL SZ 3-0 L36IN NONABSORBABLE GRN L22MM X762H

## (undated) DEVICE — HANDPIECE SET WITH BONE CLEANING TIP AND SUCTION TUBE: Brand: INTERPULSE

## (undated) DEVICE — STIFFEN MICRO-INTRODUCER KIT: Brand: STIFFEN MICRO-INTRODUCER KIT

## (undated) DEVICE — DRAPE,U/ SHT,SPLIT,PLAS,STERIL: Brand: MEDLINE

## (undated) DEVICE — NEEDLE HYPO 21GA L1.5IN INTRAMUSCULAR S STL LATCH BVL UP

## (undated) DEVICE — STRAP,POSITIONING,KNEE/BODY,FOAM,4X60": Brand: MEDLINE

## (undated) DEVICE — DEVICE TRNSF SPIK STL 2008S] MICROTEK MEDICAL INC]

## (undated) DEVICE — CANNULA PERF 15FR L12.5IN RG STPCOCK WIREWOUND BODY

## (undated) DEVICE — PADDING CST 6IN STERILE --

## (undated) DEVICE — BLADE SAW W0.49XL3.15IN THK0.047IN CUT THK0.047IN REPL SAG

## (undated) DEVICE — CATHETER,URETHRAL,REDRUBBER,STRL,18FR: Brand: MEDLINE

## (undated) DEVICE — SOLUTION IRRIG 1000ML H2O STRL BLT

## (undated) DEVICE — SPONGE GZ W4XL4IN COT 12 PLY TYP VII WVN C FLD DSGN

## (undated) DEVICE — STERILE POLYISOPRENE POWDER-FREE SURGICAL GLOVES: Brand: PROTEXIS

## (undated) DEVICE — SUTURE VCRL SZ 2-0 L36IN ABSRB UD L40MM CT 1/2 CIR J957H

## (undated) DEVICE — MERITMEDICAL 7FR PRELUDE SNAP PEEL-AWAY

## (undated) DEVICE — 12CM IQ STANDARD: Brand: SONOPET IQ

## (undated) DEVICE — 4-PORT MANIFOLD: Brand: NEPTUNE 2

## (undated) DEVICE — CANNULA PERF L55IN OD7FR AORT ROOT AG STD TIP FLOW GRD INTRO

## (undated) DEVICE — T4 HOOD

## (undated) DEVICE — PACEMAKER PACK: Brand: MEDLINE INDUSTRIES, INC.

## (undated) DEVICE — DRSG AQUACEL SURG 3.5 X 10IN -- CONVERT TO ITEM 370183

## (undated) DEVICE — SUTURE MONOCRYL + ABSORBABLE MONOFILAMENT 2-0 CT1 12 IN UD SXMP1B412

## (undated) DEVICE — SUTURE PROL SZ 4-0 L36IN NONABSORBABLE BLU L26MM SH 1/2 CIR 8521H

## (undated) DEVICE — CV INCISE SHEET: Brand: CONVERTORS

## (undated) DEVICE — SCRUB DRY SURG EZ SCRUB BRUSH PREOPERATIVE GRN

## (undated) DEVICE — SOLUTION IV 1000ML 140MEQ/L SOD 5MEQ/L K 3MEQ/L MG 27MEQ/L

## (undated) DEVICE — SOL IRR SOD CL 0.9% 1000ML BTL --

## (undated) DEVICE — INFECTION CONTROL KIT SYS

## (undated) DEVICE — SUTURE PROL SZ 3-0 L36IN NONABSORBABLE BLU L26MM SH 1/2 CIR 8522H

## (undated) DEVICE — SUTURE VICRYL + SZ 0 L18IN ABSRB VLT CT L40MM 1 2 CIR TAPR PNT

## (undated) DEVICE — SWAB CULT DBL W/O CHAR RAYON TIP AMIES GEL CLMN FOR COLL

## (undated) DEVICE — PREP SKN PREVAIL 40ML APPL --

## (undated) DEVICE — CATHETER,FOLEY,100%SILICONE,24FR,30ML,LF: Brand: MEDLINE

## (undated) DEVICE — SYRINGE MEDICAL 3ML CLEAR PLASTIC STANDARD NON CONTROL LUERLOCK TIP DISPOSABLE

## (undated) DEVICE — IMPLANTABLE DEVICE: Type: IMPLANTABLE DEVICE | Site: HEART | Status: NON-FUNCTIONAL

## (undated) DEVICE — SUTURE STRATAFIX SPRL PDS + VLT 3-0 15CM DISPOSABLE/SNGLE SXPP1B420

## (undated) DEVICE — SUTURE MONOCRYL STRATAFIX SPRL + SZ 4-0 L12IN ABSRB UD PS-2 SXMP1B117

## (undated) DEVICE — 3M™ IOBAN™ 2 ANTIMICROBIAL INCISE DRAPE 6650EZ: Brand: IOBAN™ 2

## (undated) DEVICE — BLADE ES L4IN INSUL EDGE

## (undated) DEVICE — GOWN,SIRUS,NONRNF,SETINSLV,2XL,18/CS: Brand: MEDLINE

## (undated) DEVICE — CONTAINER,SPECIMEN,3OZ,OR STRL: Brand: MEDLINE

## (undated) DEVICE — DRESSING FOAM W8.7XL9.1IN SAFETAC LAYR SELF ADH MEPILEX

## (undated) DEVICE — DRAIN SURG SGL COLL PT TB FOR ATS BG OASIS

## (undated) DEVICE — COR-KNOT MINI® COMBO KITBASE PACKAGE TYPE - KITEACH STERILE PACKAGE KIT CONTAINS (2) SINGLE PATIENT USE COR-KNOT MINI® DEVICES AND (12) COR-KNOT® QUICK LOADS®.: Brand: COR-KNOT MINI®

## (undated) DEVICE — ADHESIVE SKIN CLOSURE XL 42 CM 2.7 CC MESH LIQUIBAND SECUR

## (undated) DEVICE — SUTURE VCRL 1 L27IN ABSRB CT BRAID COAT UD J281H

## (undated) DEVICE — PRESSURE MONITORING SET: Brand: TRUWAVE

## (undated) DEVICE — SUTURE STRATAFIX SYMMETRIC PDS + SZ 1 L18IN ABSRB VLT L48MM SXPP1A400

## (undated) DEVICE — PIN DRL QUIK HI PERF FOR SIG SYS

## (undated) DEVICE — IRRIGATION SUCTION CASSETTE: Brand: SONOPET IQ

## (undated) DEVICE — APPLICATOR MEDICATED 10.5 CC SOLUTION CLR STRL CHLORAPREP

## (undated) DEVICE — BOWL BNE CEM MIX SPAT CURET SMARTMIX CTS

## (undated) DEVICE — SOLUTION IV 1000ML 0.9% SOD CHL PH 5 INJ USP VIAFLX PLAS

## (undated) DEVICE — SUTURE MNFLMNT SH 26 MM 1/2 CI CRCLE TPR PNT SYMTRC PD PLU

## (undated) DEVICE — SOLUTION IRRIG 1000ML 0.9% SOD CHL USP POUR PLAS BTL

## (undated) DEVICE — BLADE SAW W098XL354IN THK0047IN CUT THK0047IN SAG

## (undated) DEVICE — OPEN HEART A- RICHMOND: Brand: MEDLINE INDUSTRIES, INC.

## (undated) DEVICE — SPONGE GZ W4XL4IN COT RADPQ HIGHLY ABSRB STERILE

## (undated) DEVICE — CONNECTOR PERF Y TYP 3/8X3/8X0.5 IN

## (undated) DEVICE — X-RAY DETECTABLE SPONGES,16 PLY: Brand: VISTEC

## (undated) DEVICE — OPEN HEART B-RICHMOND: Brand: MEDLINE INDUSTRIES, INC.